# Patient Record
Sex: FEMALE | Race: WHITE | URBAN - METROPOLITAN AREA
[De-identification: names, ages, dates, MRNs, and addresses within clinical notes are randomized per-mention and may not be internally consistent; named-entity substitution may affect disease eponyms.]

---

## 2017-01-09 RX ORDER — PANTOPRAZOLE SODIUM 40 MG/1
TABLET, DELAYED RELEASE ORAL
Qty: 90 TABLET | Refills: 0 | Status: SHIPPED | OUTPATIENT
Start: 2017-01-09 | End: 2017-04-15

## 2017-02-23 ENCOUNTER — TELEPHONE (OUTPATIENT)
Dept: INTERNAL MEDICINE CLINIC | Facility: CLINIC | Age: 62
End: 2017-02-23

## 2017-02-23 NOTE — TELEPHONE ENCOUNTER
Spoke with pt. States that her symptoms started last night. States that she is aching/chilling, has headache and facial pressure, has loose, non-productive cough. Pt states that her stomach is upset but denies vomiting/diarrhea at this time.   Pt states t

## 2017-02-23 NOTE — TELEPHONE ENCOUNTER
Patient has had real bad cough, head ache and body aches. Patient said this started early today and its quickly getting worse. Patient wanted to know if there is any O.T.C.  Medication that she can take that won't effect her high blood pressure medication

## 2017-03-30 ENCOUNTER — TELEPHONE (OUTPATIENT)
Dept: INTERNAL MEDICINE CLINIC | Facility: CLINIC | Age: 62
End: 2017-03-30

## 2017-03-30 DIAGNOSIS — Z12.11 SCREENING FOR MALIGNANT NEOPLASM OF COLON: Primary | ICD-10-CM

## 2017-03-30 NOTE — TELEPHONE ENCOUNTER
Spoke with pt. States that she saw Dr. Hazel Rivera, see Notes 12/3/15, but did not have Cscope done. Pt states that she plans to do Cscope now, and her insurance will pay for the test but not EGD.    States that she thinks Dr. Hazel Rivera is still in her network an

## 2017-03-30 NOTE — TELEPHONE ENCOUNTER
Patient called regarding back in 2015 (December) Dr. Abbey Mariscal ordered a referral for a colonoscopy and endoscopy. Patient never got these procedures done, because she didn't know if they were covered under insurance.  Patient finally contacted insurance and wa

## 2017-04-01 ENCOUNTER — HOSPITAL ENCOUNTER (OUTPATIENT)
Dept: MAMMOGRAPHY | Facility: HOSPITAL | Age: 62
Discharge: HOME | End: 2017-04-01
Attending: INTERNAL MEDICINE
Payer: COMMERCIAL

## 2017-04-01 DIAGNOSIS — Z12.39 BREAST CANCER SCREENING: ICD-10-CM

## 2017-04-01 PROCEDURE — 77067 SCR MAMMO BI INCL CAD: CPT

## 2017-04-17 RX ORDER — PANTOPRAZOLE SODIUM 40 MG/1
TABLET, DELAYED RELEASE ORAL
Qty: 90 TABLET | Refills: 0 | Status: SHIPPED | OUTPATIENT
Start: 2017-04-17 | End: 2017-07-08

## 2017-04-17 NOTE — TELEPHONE ENCOUNTER
Last OV pertinent to medication: 7/14/16 for physical   Last refill date: 1/9/17     #/refills: #90 + 0  When pt was asked to return for OV: 1 year   Upcoming appt/reason: No future appt

## 2017-07-08 ENCOUNTER — APPOINTMENT (OUTPATIENT)
Dept: GENERAL RADIOLOGY | Facility: HOSPITAL | Age: 62
End: 2017-07-08
Attending: EMERGENCY MEDICINE
Payer: COMMERCIAL

## 2017-07-08 ENCOUNTER — HOSPITAL ENCOUNTER (EMERGENCY)
Facility: HOSPITAL | Age: 62
Discharge: HOME | End: 2017-07-08
Attending: EMERGENCY MEDICINE
Payer: COMMERCIAL

## 2017-07-08 VITALS
TEMPERATURE: 99 F | SYSTOLIC BLOOD PRESSURE: 140 MMHG | DIASTOLIC BLOOD PRESSURE: 84 MMHG | HEIGHT: 69 IN | BODY MASS INDEX: 27.4 KG/M2 | RESPIRATION RATE: 14 BRPM | OXYGEN SATURATION: 98 % | HEART RATE: 82 BPM | WEIGHT: 185 LBS

## 2017-07-08 DIAGNOSIS — S60.222A CONTUSION OF LEFT HAND, INITIAL ENCOUNTER: Primary | ICD-10-CM

## 2017-07-08 DIAGNOSIS — S01.81XA FACIAL LACERATION, INITIAL ENCOUNTER: ICD-10-CM

## 2017-07-08 PROCEDURE — 12011 RPR F/E/E/N/L/M 2.5 CM/<: CPT

## 2017-07-08 PROCEDURE — 99283 EMERGENCY DEPT VISIT LOW MDM: CPT

## 2017-07-08 PROCEDURE — 73110 X-RAY EXAM OF WRIST: CPT | Performed by: EMERGENCY MEDICINE

## 2017-07-08 PROCEDURE — 73130 X-RAY EXAM OF HAND: CPT | Performed by: EMERGENCY MEDICINE

## 2017-07-08 NOTE — ED INITIAL ASSESSMENT (HPI)
Pt here after walking outside this am for exercise , tripped and hit sidewalk hitting head on right upper forehead. Denies LOC. C/o pain to left hand with slight edema to palm and thumb area. +abrasions to left knee.

## 2017-07-08 NOTE — ED PROVIDER NOTES
Patient Seen in: [facility] Emergency Department    History   Patient presents with:  Fall (musculoskeletal, neurologic)    Stated Complaint: fall    TEJINDER Burks is a pleasant 17-year-old female presenting to the [facility] emergency department for f Review of Systems    Positive for stated complaint: fall  Other systems are as noted in HPI. Constitutional and vital signs reviewed. All other systems reviewed and negative except as noted above.     PSFH elements reviewed from today and agreed specified.     Medications Prescribed:  Current Discharge Medication List

## 2017-07-10 RX ORDER — PANTOPRAZOLE SODIUM 40 MG/1
TABLET, DELAYED RELEASE ORAL
Qty: 90 TABLET | Refills: 0 | Status: SHIPPED | OUTPATIENT
Start: 2017-07-10 | End: 2017-08-05

## 2017-07-10 NOTE — TELEPHONE ENCOUNTER
Last OV pertinent to medication: 7/14/2016 - PE  Last refill date: 4/17/2017     #/refills: 90/0  When pt was asked to return for OV: 1 year  Upcoming appt/reason: 8/3/2017 - PE

## 2017-07-14 ENCOUNTER — OFFICE VISIT (OUTPATIENT)
Dept: INTERNAL MEDICINE CLINIC | Facility: CLINIC | Age: 62
End: 2017-07-14

## 2017-07-14 VITALS
HEART RATE: 84 BPM | WEIGHT: 191 LBS | OXYGEN SATURATION: 99 % | DIASTOLIC BLOOD PRESSURE: 80 MMHG | HEIGHT: 67.5 IN | SYSTOLIC BLOOD PRESSURE: 124 MMHG | BODY MASS INDEX: 29.63 KG/M2 | RESPIRATION RATE: 12 BRPM

## 2017-07-14 DIAGNOSIS — M25.532 LEFT WRIST PAIN: ICD-10-CM

## 2017-07-14 DIAGNOSIS — Z48.02 VISIT FOR SUTURE REMOVAL: Primary | ICD-10-CM

## 2017-07-14 PROCEDURE — 99213 OFFICE O/P EST LOW 20 MIN: CPT | Performed by: PHYSICIAN ASSISTANT

## 2017-07-14 NOTE — PROGRESS NOTES
Yash Langston is a 63year old female  Patient presents with:  Suture Removal      HPI:   Pt states she went for a walk last Saturday and wasn't paying attention and was listening/watching the birds and she tripped and fell, tried to catch herself with BMI 29.47 kg/m²   GENERAL: well developed, well nourished,in no apparent distress  SKIN: 4 sutures removed from superior lateral aspect of eye brow;    HEENT: atraumatic, normocephalic,ears and throat are clear, no pallor or icterus  NECK: supple,no adenopa

## 2017-08-05 ENCOUNTER — OFFICE VISIT (OUTPATIENT)
Dept: INTERNAL MEDICINE CLINIC | Facility: CLINIC | Age: 62
End: 2017-08-05

## 2017-08-05 VITALS
WEIGHT: 191 LBS | HEART RATE: 72 BPM | RESPIRATION RATE: 12 BRPM | SYSTOLIC BLOOD PRESSURE: 120 MMHG | HEIGHT: 67.72 IN | TEMPERATURE: 98 F | BODY MASS INDEX: 29.29 KG/M2 | DIASTOLIC BLOOD PRESSURE: 76 MMHG

## 2017-08-05 DIAGNOSIS — Z00.00 ROUTINE GENERAL MEDICAL EXAMINATION AT A HEALTH CARE FACILITY: Primary | ICD-10-CM

## 2017-08-05 DIAGNOSIS — K21.00 GASTROESOPHAGEAL REFLUX DISEASE WITH ESOPHAGITIS: ICD-10-CM

## 2017-08-05 DIAGNOSIS — I83.93 VARICOSE VEINS OF BOTH LOWER EXTREMITIES: ICD-10-CM

## 2017-08-05 DIAGNOSIS — I10 ESSENTIAL HYPERTENSION: ICD-10-CM

## 2017-08-05 PROCEDURE — 99396 PREV VISIT EST AGE 40-64: CPT | Performed by: INTERNAL MEDICINE

## 2017-08-05 RX ORDER — PANTOPRAZOLE SODIUM 40 MG/1
TABLET, DELAYED RELEASE ORAL
Qty: 90 TABLET | Refills: 3 | Status: SHIPPED | OUTPATIENT
Start: 2017-08-05 | End: 2018-10-06

## 2017-08-05 RX ORDER — LISINOPRIL AND HYDROCHLOROTHIAZIDE 25; 20 MG/1; MG/1
1 TABLET ORAL
Qty: 90 TABLET | Refills: 3 | Status: SHIPPED | OUTPATIENT
Start: 2017-08-05 | End: 2018-10-20

## 2017-08-05 RX ORDER — MULTIVIT-MIN/IRON/FOLIC ACID/K 18-600-40
CAPSULE ORAL
Qty: 30 CAPSULE | Refills: 0 | COMMUNITY
Start: 2017-08-05 | End: 2018-06-19 | Stop reason: ALTCHOICE

## 2017-08-05 RX ORDER — METOPROLOL SUCCINATE 25 MG/1
25 TABLET, EXTENDED RELEASE ORAL
Qty: 90 TABLET | Refills: 3 | Status: SHIPPED | OUTPATIENT
Start: 2017-08-05 | End: 2018-10-20

## 2017-08-05 NOTE — PROGRESS NOTES
HPI:   Marcia Hope is a 63year old female who presents for a complete physical exam.     Wt Readings from Last 6 Encounters:  08/05/17 : 191 lb  07/14/17 : 191 lb  07/08/17 : 185 lb  04/21/17 : 190 lb  07/14/16 : 201 lb  02/22/16 : 203 lb    Body ma exertion  GI: denies abdominal pain, change in bm's, bloody stools, she is having worsening heartburn, has to control w/lifestyle strictly.  No dysphagia or odynophagia.   : chronic PPI, couln'd wean, she is now scheduled for EGD/CSCOPE in October   : den instructions given for healthy living as indicated. The patient indicates understanding of these issues and agrees to the plan. The patient is asked to return for CPX in 1 year.     Routine general medical examination at a health care facility  (primary en

## 2017-09-06 ENCOUNTER — TELEPHONE (OUTPATIENT)
Dept: INTERNAL MEDICINE CLINIC | Facility: CLINIC | Age: 62
End: 2017-09-06

## 2017-09-06 NOTE — TELEPHONE ENCOUNTER
Patient phoned. She wants to speak with nurse about \"potential issue she has with upcoming colonscopy\". Patient didn't want to provide details. Patient is seeing Dr. Eugenia Rojas on 10/2/17 for the procedure.

## 2017-09-06 NOTE — TELEPHONE ENCOUNTER
Spoke with pt. Pt states that she has had a problem with rash/lesions in anal area off/on for many years. States that they go away after several days and she has never been seen by a doctor for this.  Pt states that when she mentioned this to the GI doctor

## 2017-09-07 NOTE — TELEPHONE ENCOUNTER
Patient was given several options for appointment times and none of them fit in her schedule. Patient is requesting a prescription for the rash. Please advise.

## 2017-09-19 ENCOUNTER — TELEPHONE (OUTPATIENT)
Dept: INTERNAL MEDICINE CLINIC | Facility: CLINIC | Age: 62
End: 2017-09-19

## 2017-09-19 NOTE — TELEPHONE ENCOUNTER
Patient phoned and is asking if she should see Dr. Merari Marshall or a specialist about the skin rash near her rectum.   The gastroenterologist stated she should see an infectious disease specialist but patient is asking if that is the correct course of action to ta

## 2017-09-19 NOTE — TELEPHONE ENCOUNTER
Spoke with pt, she had called about this before (telephone encounter 9/6/17) and was instructed she need an evaluation at that time, she was recommended to go to urgent care for sooner appt since no immediate appts were available at that time, she did not

## 2017-09-21 NOTE — TELEPHONE ENCOUNTER
Patient has a appointment with Dr. Michael Morales on 9/30/2017 for \"skin lesion\" that was made on 9/19/17, please advise if needed, thank you.

## 2017-09-21 NOTE — TELEPHONE ENCOUNTER
Spoke with pt. Confirmed 9/30/17 appt with Dr. Marilyn Chung to be seen for rash/lesions in anal area.    See 9/6/17 documentation below--Pt had stated that GI doctor recommended that colonoscopy be postponed if pt had active rash in anal area at time of test.

## 2017-09-21 NOTE — TELEPHONE ENCOUNTER
I really have no idea, does she have a skin lesion that needs to be evaluated?   I'm not really following   Also she cancelled GI procedures for October, why??

## 2017-09-29 ENCOUNTER — TELEPHONE (OUTPATIENT)
Dept: INTERNAL MEDICINE CLINIC | Facility: CLINIC | Age: 62
End: 2017-09-29

## 2017-09-30 ENCOUNTER — OFFICE VISIT (OUTPATIENT)
Dept: INTERNAL MEDICINE CLINIC | Facility: CLINIC | Age: 62
End: 2017-09-30

## 2017-09-30 VITALS
TEMPERATURE: 98 F | HEIGHT: 67.13 IN | RESPIRATION RATE: 16 BRPM | SYSTOLIC BLOOD PRESSURE: 114 MMHG | WEIGHT: 192 LBS | BODY MASS INDEX: 29.78 KG/M2 | HEART RATE: 72 BPM | DIASTOLIC BLOOD PRESSURE: 78 MMHG

## 2017-09-30 DIAGNOSIS — K62.9 RECTAL LESION: Primary | ICD-10-CM

## 2017-09-30 DIAGNOSIS — Z23 NEED FOR VACCINATION: ICD-10-CM

## 2017-09-30 PROCEDURE — 90471 IMMUNIZATION ADMIN: CPT | Performed by: INTERNAL MEDICINE

## 2017-09-30 PROCEDURE — 90686 IIV4 VACC NO PRSV 0.5 ML IM: CPT | Performed by: INTERNAL MEDICINE

## 2017-09-30 PROCEDURE — 99213 OFFICE O/P EST LOW 20 MIN: CPT | Performed by: INTERNAL MEDICINE

## 2018-06-19 ENCOUNTER — TELEPHONE (OUTPATIENT)
Dept: INTERNAL MEDICINE CLINIC | Facility: CLINIC | Age: 63
End: 2018-06-19

## 2018-06-19 ENCOUNTER — OFFICE VISIT (OUTPATIENT)
Dept: INTERNAL MEDICINE CLINIC | Facility: CLINIC | Age: 63
End: 2018-06-19

## 2018-06-19 ENCOUNTER — LABORATORY ENCOUNTER (OUTPATIENT)
Dept: LAB | Age: 63
End: 2018-06-19
Attending: INTERNAL MEDICINE
Payer: COMMERCIAL

## 2018-06-19 VITALS
SYSTOLIC BLOOD PRESSURE: 102 MMHG | HEART RATE: 77 BPM | RESPIRATION RATE: 14 BRPM | DIASTOLIC BLOOD PRESSURE: 60 MMHG | TEMPERATURE: 98 F | BODY MASS INDEX: 30.24 KG/M2 | WEIGHT: 192.63 LBS | OXYGEN SATURATION: 99 % | HEIGHT: 67 IN

## 2018-06-19 DIAGNOSIS — R11.2 NON-INTRACTABLE VOMITING WITH NAUSEA, UNSPECIFIED VOMITING TYPE: ICD-10-CM

## 2018-06-19 DIAGNOSIS — R10.84 GENERALIZED ABDOMINAL PAIN: ICD-10-CM

## 2018-06-19 DIAGNOSIS — R10.84 GENERALIZED ABDOMINAL PAIN: Primary | ICD-10-CM

## 2018-06-19 DIAGNOSIS — K21.00 GASTROESOPHAGEAL REFLUX DISEASE WITH ESOPHAGITIS: ICD-10-CM

## 2018-06-19 PROCEDURE — 99214 OFFICE O/P EST MOD 30 MIN: CPT | Performed by: INTERNAL MEDICINE

## 2018-06-19 PROCEDURE — 85025 COMPLETE CBC W/AUTO DIFF WBC: CPT | Performed by: INTERNAL MEDICINE

## 2018-06-19 PROCEDURE — 80053 COMPREHEN METABOLIC PANEL: CPT | Performed by: INTERNAL MEDICINE

## 2018-06-19 PROCEDURE — 36415 COLL VENOUS BLD VENIPUNCTURE: CPT | Performed by: INTERNAL MEDICINE

## 2018-06-19 RX ORDER — ONDANSETRON 4 MG/1
4 TABLET, FILM COATED ORAL EVERY 8 HOURS PRN
Qty: 10 TABLET | Refills: 0 | Status: SHIPPED | OUTPATIENT
Start: 2018-06-19 | End: 2018-10-12 | Stop reason: ALTCHOICE

## 2018-06-19 NOTE — TELEPHONE ENCOUNTER
Patient has the stomach flu for the 3rd time in 3 1/2 weeks. She wanted to be evaluated and made an appt with Dr Wiliam Pillai for 1:20 today. Should she be seen sooner?

## 2018-06-19 NOTE — TELEPHONE ENCOUNTER
Attempted to triage the patient, but she was not home. LM for the patient to call to triage. Noted in chart that the patient has been seeing GI since 2015. The patient had an upper endoscopy scheduled on 10/02/17, but it was cancelled.  Unsure as to why it

## 2018-06-19 NOTE — PROGRESS NOTES
German Urena is a 66year old female  Patient presents with:  Stomach Pain  Nausea  Vomiting  Diarrhea      HPI:   3 distinct episodes of n/v and diarrhea, 1st episode 3 weeks ago, all episodes short lived, 1 day    Last one last night, she was feelin occlusion)     HTN (hypertension)     Gastroesophageal reflux disease with esophagitis        REVIEW OF SYSTEMS:   GENERAL HEALTH: feels well otherwise      EXAM:   /60 (BP Location: Left arm, Patient Position: Sitting, Cuff Size: adult)   Pulse 77

## 2018-06-19 NOTE — TELEPHONE ENCOUNTER
Spoke with pt, 3x in past 3 weeks has had sudden onset of nausea then vomiting and diarrhea lasting approx day or less. Last episode last night, resolved today, did not have diarrhea this time however. Son and  has had similar symptoms.  Each time [employer]

## 2018-10-08 RX ORDER — PANTOPRAZOLE SODIUM 40 MG/1
TABLET, DELAYED RELEASE ORAL
Qty: 30 TABLET | Refills: 0 | Status: SHIPPED | OUTPATIENT
Start: 2018-10-08 | End: 2019-02-12

## 2018-10-08 NOTE — TELEPHONE ENCOUNTER
Last OV relevant to medication: 6/19/18  Last refill date: 8/5/17     #/refills: 90/3  When pt was asked to return for OV: none  Upcoming appt/reason: none, called and left message for patient, she is due for PE

## 2018-10-12 ENCOUNTER — TELEPHONE (OUTPATIENT)
Dept: INTERNAL MEDICINE CLINIC | Facility: CLINIC | Age: 63
End: 2018-10-12

## 2018-10-12 ENCOUNTER — OFFICE VISIT (OUTPATIENT)
Dept: INTERNAL MEDICINE CLINIC | Facility: CLINIC | Age: 63
End: 2018-10-12
Payer: COMMERCIAL

## 2018-10-12 VITALS
HEART RATE: 65 BPM | TEMPERATURE: 98 F | WEIGHT: 194.38 LBS | DIASTOLIC BLOOD PRESSURE: 76 MMHG | RESPIRATION RATE: 16 BRPM | HEIGHT: 67 IN | OXYGEN SATURATION: 98 % | BODY MASS INDEX: 30.51 KG/M2 | SYSTOLIC BLOOD PRESSURE: 112 MMHG

## 2018-10-12 DIAGNOSIS — Z23 NEED FOR VACCINATION: ICD-10-CM

## 2018-10-12 DIAGNOSIS — A60.00 HERPES SIMPLEX INFECTION OF GENITOURINARY SYSTEM: Primary | ICD-10-CM

## 2018-10-12 PROCEDURE — 99213 OFFICE O/P EST LOW 20 MIN: CPT | Performed by: INTERNAL MEDICINE

## 2018-10-12 PROCEDURE — 90471 IMMUNIZATION ADMIN: CPT | Performed by: INTERNAL MEDICINE

## 2018-10-12 PROCEDURE — 90686 IIV4 VACC NO PRSV 0.5 ML IM: CPT | Performed by: INTERNAL MEDICINE

## 2018-10-12 RX ORDER — VALACYCLOVIR HYDROCHLORIDE 1 G/1
2 TABLET, FILM COATED ORAL EVERY 12 HOURS SCHEDULED
Qty: 4 TABLET | Refills: 1 | Status: SHIPPED | OUTPATIENT
Start: 2018-10-12 | End: 2018-10-13

## 2018-10-12 NOTE — PROGRESS NOTES
Tyron Fu is a 66year old female  Patient presents with:  Rash: Left Buttock - stings and itches  recurrent rash buttocks for 30 years,     HPI:   Less and less outbreaks  Last outbreak>1 year  Does not self-treat  Usually last 3-5 days     Lesia PLAN:   Herpes simplex infection of genitourinary system  (primary encounter diagnosis) infrequent recurrences- valtrex prn    No orders of the defined types were placed in this encounter.       Meds & Refills for this Visit:  Requested Prescriptions     Si

## 2018-10-12 NOTE — TELEPHONE ENCOUNTER
Patient stated that Dr. Jesus Golden had asked her to return when patient was actively having a flare up of rash near her buttock so that Dr. Jesus Golden could examine.  Patient is having a flare up and is wanting to schedule today, patient states having open lesions be

## 2018-10-22 RX ORDER — LISINOPRIL AND HYDROCHLOROTHIAZIDE 25; 20 MG/1; MG/1
TABLET ORAL
Qty: 90 TABLET | Refills: 0 | Status: SHIPPED | OUTPATIENT
Start: 2018-10-22 | End: 2019-01-26

## 2018-10-22 RX ORDER — METOPROLOL SUCCINATE 25 MG/1
TABLET, EXTENDED RELEASE ORAL
Qty: 90 TABLET | Refills: 0 | Status: SHIPPED | OUTPATIENT
Start: 2018-10-22 | End: 2019-01-26

## 2018-11-03 ENCOUNTER — OFFICE VISIT (OUTPATIENT)
Dept: INTERNAL MEDICINE CLINIC | Facility: CLINIC | Age: 63
End: 2018-11-03
Payer: COMMERCIAL

## 2018-11-03 VITALS
HEIGHT: 67 IN | RESPIRATION RATE: 14 BRPM | OXYGEN SATURATION: 99 % | SYSTOLIC BLOOD PRESSURE: 134 MMHG | DIASTOLIC BLOOD PRESSURE: 82 MMHG | HEART RATE: 79 BPM | WEIGHT: 195.38 LBS | TEMPERATURE: 98 F | BODY MASS INDEX: 30.66 KG/M2

## 2018-11-03 DIAGNOSIS — K21.00 GASTROESOPHAGEAL REFLUX DISEASE WITH ESOPHAGITIS: ICD-10-CM

## 2018-11-03 DIAGNOSIS — Z12.11 COLON CANCER SCREENING: ICD-10-CM

## 2018-11-03 DIAGNOSIS — I10 ESSENTIAL HYPERTENSION: ICD-10-CM

## 2018-11-03 DIAGNOSIS — Z00.00 ROUTINE GENERAL MEDICAL EXAMINATION AT A HEALTH CARE FACILITY: Primary | ICD-10-CM

## 2018-11-03 DIAGNOSIS — R10.84 GENERALIZED ABDOMINAL PAIN: ICD-10-CM

## 2018-11-03 DIAGNOSIS — Z79.899 HIGH RISK MEDICATION USE: ICD-10-CM

## 2018-11-03 DIAGNOSIS — Z12.39 SCREENING BREAST EXAMINATION: ICD-10-CM

## 2018-11-03 PROCEDURE — 99396 PREV VISIT EST AGE 40-64: CPT | Performed by: INTERNAL MEDICINE

## 2018-11-03 PROCEDURE — 99213 OFFICE O/P EST LOW 20 MIN: CPT | Performed by: INTERNAL MEDICINE

## 2018-11-03 PROCEDURE — 87624 HPV HI-RISK TYP POOLED RSLT: CPT | Performed by: INTERNAL MEDICINE

## 2018-11-03 RX ORDER — RANITIDINE 300 MG/1
300 TABLET ORAL NIGHTLY
Qty: 90 TABLET | Refills: 3 | Status: SHIPPED | OUTPATIENT
Start: 2018-11-03 | End: 2020-03-16

## 2018-11-03 NOTE — PROGRESS NOTES
HPI:   Karishma Officer is a 66year old female who presents for a complete physical exam.     Wt Readings from Last 6 Encounters:  11/03/18 : 195 lb 6.4 oz  10/12/18 : 194 lb 6.4 oz  06/19/18 : 192 lb 9.6 oz  09/30/17 : 192 lb  09/05/17 : 191 lb  08/05/1 double vision  HEENT: denies nasal congestion, sinus pain or ST  LUNGS: denies shortness of breath with exertion  CARDIOVASCULAR: denies chest pain on exertion  GI: denies abdominal pain, change in bm's, bloody stools, she is having worsening heartburn, ha stasis change  NEURO: Oriented times three,cranial nerves are intact,motor and sensory are grossly intact, DTR's b/l equal,            ASSESSMENT AND PLAN:   Raul Randle is a 66year old female who presents for a complete physical exam.pap, breast an Refills for this Visit:  Requested Prescriptions     Signed Prescriptions Disp Refills   • RaNITidine HCl 300 MG Oral Tab 90 tablet 3     Sig: Take 1 tablet (300 mg total) by mouth nightly.        Imaging & Consults:  GASTRO - INTERNAL  MYRA SCREENING BILAT

## 2018-11-07 ENCOUNTER — LAB ENCOUNTER (OUTPATIENT)
Dept: LAB | Age: 63
End: 2018-11-07
Attending: INTERNAL MEDICINE
Payer: COMMERCIAL

## 2018-11-07 DIAGNOSIS — Z79.899 HIGH RISK MEDICATION USE: ICD-10-CM

## 2018-11-07 DIAGNOSIS — Z00.00 ROUTINE GENERAL MEDICAL EXAMINATION AT A HEALTH CARE FACILITY: Primary | ICD-10-CM

## 2018-11-07 PROCEDURE — 82607 VITAMIN B-12: CPT | Performed by: INTERNAL MEDICINE

## 2018-11-07 PROCEDURE — 80050 GENERAL HEALTH PANEL: CPT | Performed by: INTERNAL MEDICINE

## 2018-11-07 PROCEDURE — 83735 ASSAY OF MAGNESIUM: CPT | Performed by: INTERNAL MEDICINE

## 2018-11-07 PROCEDURE — 82306 VITAMIN D 25 HYDROXY: CPT | Performed by: INTERNAL MEDICINE

## 2018-11-07 PROCEDURE — 80061 LIPID PANEL: CPT | Performed by: INTERNAL MEDICINE

## 2018-11-07 PROCEDURE — 36415 COLL VENOUS BLD VENIPUNCTURE: CPT | Performed by: INTERNAL MEDICINE

## 2018-12-11 ENCOUNTER — HOSPITAL ENCOUNTER (OUTPATIENT)
Dept: MAMMOGRAPHY | Facility: HOSPITAL | Age: 63
Discharge: HOME | End: 2018-12-11
Attending: INTERNAL MEDICINE
Payer: COMMERCIAL

## 2018-12-11 DIAGNOSIS — Z12.39 SCREENING BREAST EXAMINATION: ICD-10-CM

## 2018-12-11 PROCEDURE — 77067 SCR MAMMO BI INCL CAD: CPT | Performed by: INTERNAL MEDICINE

## 2018-12-11 PROCEDURE — 77063 BREAST TOMOSYNTHESIS BI: CPT | Performed by: INTERNAL MEDICINE

## 2019-01-28 RX ORDER — METOPROLOL SUCCINATE 25 MG/1
TABLET, EXTENDED RELEASE ORAL
Qty: 90 TABLET | Refills: 1 | Status: SHIPPED | OUTPATIENT
Start: 2019-01-28 | End: 2019-07-13

## 2019-01-28 RX ORDER — LISINOPRIL AND HYDROCHLOROTHIAZIDE 25; 20 MG/1; MG/1
TABLET ORAL
Qty: 90 TABLET | Refills: 1 | Status: SHIPPED | OUTPATIENT
Start: 2019-01-28 | End: 2019-07-13

## 2019-02-21 ENCOUNTER — OFFICE VISIT (OUTPATIENT)
Dept: SURGERY | Facility: CLINIC | Age: 64
End: 2019-02-21
Payer: COMMERCIAL

## 2019-02-21 VITALS
HEART RATE: 74 BPM | SYSTOLIC BLOOD PRESSURE: 129 MMHG | DIASTOLIC BLOOD PRESSURE: 80 MMHG | WEIGHT: 185 LBS | HEIGHT: 68 IN | BODY MASS INDEX: 28.04 KG/M2

## 2019-02-21 DIAGNOSIS — I10 ESSENTIAL HYPERTENSION: ICD-10-CM

## 2019-02-21 DIAGNOSIS — I83.819 VARICOSE VEINS WITH PAIN: ICD-10-CM

## 2019-02-21 DIAGNOSIS — Z86.79 S/P SCLEROTHERAPY OF VARICOSE VEINS: ICD-10-CM

## 2019-02-21 DIAGNOSIS — I83.899 VARICOSE VEINS WITH COMPLICATIONS: Primary | ICD-10-CM

## 2019-02-21 DIAGNOSIS — Z98.890 S/P SCLEROTHERAPY OF VARICOSE VEINS: ICD-10-CM

## 2019-02-21 PROCEDURE — 99243 OFF/OP CNSLTJ NEW/EST LOW 30: CPT | Performed by: SURGERY

## 2019-02-22 NOTE — H&P
New Patient Visit Note       Active Problems      1. Varicose veins with complications    2. Varicose veins with pain    3. Essential hypertension    4.  S/P sclerotherapy of varicose veins        Chief Complaint   Patient presents with:  Varicose Veins: NE Vitamins-Minerals (EYE VITAMINS) Oral Cap, Take 1 capsule by mouth daily. , Disp: , Rfl:       Review of Systems  The Review of Systems has been reviewed by me during today.   Review of Systems    Physical Findings   /80   Pulse 74   Ht 68\"   Wt 185 l extremity  The patient will follow up with me after ultrasound is completed to discuss potential treatment options. The risks, benefits, complications, possible outcomes and alternatives of the procedure were explained to the patient in detail.

## 2019-03-20 ENCOUNTER — TELEPHONE (OUTPATIENT)
Dept: INTERNAL MEDICINE CLINIC | Facility: CLINIC | Age: 64
End: 2019-03-20

## 2019-03-20 NOTE — TELEPHONE ENCOUNTER
Patient wants to talk to a nurse about some \"medical issues\" she is having PSR tried to ask the patient what type of issues this was, the patient did not want to discuss with PSR. Did ask if this was emergent, any serious issues and the patient said no.

## 2019-03-20 NOTE — TELEPHONE ENCOUNTER
Patient calling in. Patient has a colonoscopy scheduled for 5/13/19. Patient is dx with herpes simplex infection of the genitourinary system.  Patient states that Dr. Tamika John [facility]) told her that she will not be able to have the colonoscopy if she has an a

## 2019-03-21 RX ORDER — VALACYCLOVIR HYDROCHLORIDE 1 G/1
1 TABLET, FILM COATED ORAL DAILY
Qty: 90 TABLET | Refills: 3 | Status: SHIPPED | OUTPATIENT
Start: 2019-03-21 | End: 2020-03-03

## 2019-03-21 NOTE — TELEPHONE ENCOUNTER
I would recommend she take the valtrex as prescribed right now for the current symptoms, 2 tabs now and then again in 12 h    Then start 1 g valtrex daily for the next year to prevent future outbreaks, #90 3 refills.  If she feels any outbreak coming on dur

## 2019-03-21 NOTE — TELEPHONE ENCOUNTER
Spoke to patient, she is aware of Dr. Wilian Ruiz recommendation. Patient is agreeable to use the medications as prescribed and will call if she changes her mind. Prescription sent to patients pharmacy.  Prescription instructions explained to patient in detail

## 2019-04-17 ENCOUNTER — OFFICE VISIT (OUTPATIENT)
Dept: OTHER | Facility: HOSPITAL | Age: 64
End: 2019-04-17
Attending: PREVENTIVE MEDICINE

## 2019-04-17 ENCOUNTER — HOSPITAL ENCOUNTER (OUTPATIENT)
Dept: GENERAL RADIOLOGY | Facility: HOSPITAL | Age: 64
Discharge: HOME | End: 2019-04-17
Attending: PREVENTIVE MEDICINE

## 2019-04-17 DIAGNOSIS — Z00.00 ANNUAL PHYSICAL EXAM: Primary | ICD-10-CM

## 2019-05-13 PROCEDURE — 88305 TISSUE EXAM BY PATHOLOGIST: CPT | Performed by: INTERNAL MEDICINE

## 2019-05-14 PROBLEM — Z86.010 HX OF ADENOMATOUS COLONIC POLYPS: Status: ACTIVE | Noted: 2019-05-14

## 2019-05-14 PROBLEM — Z86.0101 HX OF ADENOMATOUS COLONIC POLYPS: Status: ACTIVE | Noted: 2019-05-14

## 2019-05-29 ENCOUNTER — TELEPHONE (OUTPATIENT)
Dept: INTERNAL MEDICINE CLINIC | Facility: CLINIC | Age: 64
End: 2019-05-29

## 2019-05-29 NOTE — TELEPHONE ENCOUNTER
Patient calling to confirm what medications she is taking that are considered water pills (as she was told to discontinue water pill medication before her upcoming eye surgery 06-13-19). Please advise. Thank you!

## 2019-05-29 NOTE — TELEPHONE ENCOUNTER
Spoke with Samuel in [facility] office. Confirmed that diuretics need to be held the morning of the procedure, but can be resumed the same day after the procedure. Spoke with patient and discussed lisinopril-HCTZ.  Patient aware to hold medication

## 2019-06-07 ENCOUNTER — TELEPHONE (OUTPATIENT)
Dept: INTERNAL MEDICINE CLINIC | Facility: CLINIC | Age: 64
End: 2019-06-07

## 2019-06-07 NOTE — TELEPHONE ENCOUNTER
Incoming (mail or fax): Mail  Received from:  [employer]  Documentation given to:  Natasha George test results bin. Multiple testing, Consults. 51 pages.

## 2019-06-10 NOTE — TELEPHONE ENCOUNTER
Paperwork with multiple studies and assessment received from [employer] for [facility] Screening Program\" associated with employer ([employer]). Labs entered in external results console, as able. Routed to Dr Roman Mcfadden for review.

## 2019-06-20 ENCOUNTER — MED REC SCAN ONLY (OUTPATIENT)
Dept: INTERNAL MEDICINE CLINIC | Facility: CLINIC | Age: 64
End: 2019-06-20

## 2019-07-16 RX ORDER — LISINOPRIL AND HYDROCHLOROTHIAZIDE 25; 20 MG/1; MG/1
TABLET ORAL
Qty: 90 TABLET | Refills: 0 | Status: SHIPPED | OUTPATIENT
Start: 2019-07-16 | End: 2019-10-13

## 2019-07-16 RX ORDER — METOPROLOL SUCCINATE 25 MG/1
TABLET, EXTENDED RELEASE ORAL
Qty: 90 TABLET | Refills: 0 | Status: SHIPPED | OUTPATIENT
Start: 2019-07-16 | End: 2019-10-13

## 2019-07-16 NOTE — TELEPHONE ENCOUNTER
Last OV relevant to medication: 11/3/18 PE  Last refill date: 1/28/19     #/refills: 90/1  When pt was asked to return for OV: 1 yr pe  Upcoming appt/reason: none

## 2019-08-09 ENCOUNTER — TELEPHONE (OUTPATIENT)
Dept: SURGERY | Facility: CLINIC | Age: 64
End: 2019-08-09

## 2019-08-09 DIAGNOSIS — I83.813 VARICOSE VEINS OF BOTH LOWER EXTREMITIES WITH PAIN: Primary | ICD-10-CM

## 2019-08-09 NOTE — TELEPHONE ENCOUNTER
[facility] phoned our office, states pt is trying to schedule us doppler venous insuflt of bilateral legs. Dr.klade purcell notes order to be placed. Order faxed to [facility]. Pt notified of above. Pt states she has paper copy from [facility].

## 2019-09-12 ENCOUNTER — OFFICE VISIT (OUTPATIENT)
Dept: SURGERY | Facility: CLINIC | Age: 64
End: 2019-09-12
Payer: COMMERCIAL

## 2019-09-12 VITALS
TEMPERATURE: 98 F | HEIGHT: 68 IN | DIASTOLIC BLOOD PRESSURE: 77 MMHG | SYSTOLIC BLOOD PRESSURE: 122 MMHG | HEART RATE: 80 BPM | WEIGHT: 185 LBS | BODY MASS INDEX: 28.04 KG/M2

## 2019-09-12 DIAGNOSIS — I83.899 VARICOSE VEINS WITH COMPLICATIONS: Primary | ICD-10-CM

## 2019-09-12 DIAGNOSIS — I83.819 VARICOSE VEINS WITH PAIN: ICD-10-CM

## 2019-09-12 DIAGNOSIS — Z86.79 S/P SCLEROTHERAPY OF VARICOSE VEINS: ICD-10-CM

## 2019-09-12 DIAGNOSIS — Z98.890 S/P SCLEROTHERAPY OF VARICOSE VEINS: ICD-10-CM

## 2019-09-12 PROCEDURE — 99213 OFFICE O/P EST LOW 20 MIN: CPT | Performed by: SURGERY

## 2019-10-07 NOTE — PROGRESS NOTES
Follow Up Visit Note       Active Problems      1. Varicose veins with complications    2. Varicose veins with pain    3.  S/P sclerotherapy of varicose veins          Chief Complaint   Patient presents with:  Varicose Veins: f/u after venous u/s 8/13, c/o Disp: 90 tablet, Rfl: 0  •  valACYclovir HCl 1 G Oral Tab, Take 1 tablet (1,000 mg total) by mouth daily. , Disp: 90 tablet, Rfl: 3  •  RaNITidine HCl 300 MG Oral Tab, Take 1 tablet (300 mg total) by mouth nightly., Disp: 90 tablet, Rfl: 3  •  Multiple Lori tenderness. Musculoskeletal: Normal range of motion. She exhibits no deformity. Neurological: She is alert and oriented to person, place, and time. Skin: Skin is warm and dry. No rash noted.        Extremities:    Bilateral  scattered clusters spider obtained           No orders of the defined types were placed in this encounter. The risks, benefits, complications, possible outcomes and alternatives of the procedure were explained to the patient in detail.    Expected postoperative pain, recuperati

## 2019-10-13 DIAGNOSIS — I10 ESSENTIAL HYPERTENSION: Primary | ICD-10-CM

## 2019-10-14 ENCOUNTER — IMMUNIZATION (OUTPATIENT)
Dept: INTERNAL MEDICINE CLINIC | Facility: CLINIC | Age: 64
End: 2019-10-14
Payer: COMMERCIAL

## 2019-10-14 DIAGNOSIS — Z23 NEED FOR VACCINATION: ICD-10-CM

## 2019-10-14 PROCEDURE — 90471 IMMUNIZATION ADMIN: CPT | Performed by: INTERNAL MEDICINE

## 2019-10-14 PROCEDURE — 90686 IIV4 VACC NO PRSV 0.5 ML IM: CPT | Performed by: INTERNAL MEDICINE

## 2019-10-14 RX ORDER — LISINOPRIL AND HYDROCHLOROTHIAZIDE 25; 20 MG/1; MG/1
TABLET ORAL
Qty: 90 TABLET | Refills: 0 | Status: SHIPPED | OUTPATIENT
Start: 2019-10-14 | End: 2020-01-13

## 2019-10-14 RX ORDER — METOPROLOL SUCCINATE 25 MG/1
TABLET, EXTENDED RELEASE ORAL
Qty: 90 TABLET | Refills: 0 | Status: SHIPPED | OUTPATIENT
Start: 2019-10-14 | End: 2019-11-23

## 2019-10-15 ENCOUNTER — TELEPHONE (OUTPATIENT)
Dept: SURGERY | Facility: CLINIC | Age: 64
End: 2019-10-15

## 2019-10-15 NOTE — TELEPHONE ENCOUNTER
L/M with patient stating she can call and schedule varicose vein injections. No pre cert or predetermination needed per her insurance. See previous note.

## 2019-10-15 NOTE — TELEPHONE ENCOUNTER
Called patient's insurance BAS a [employer] plan regarding predetermination for varicose vein injections, bilaterally. I request 6 sessions. Doug Khan ref # [id number] stated no precert or pre determination needed.

## 2019-11-12 ENCOUNTER — OFFICE VISIT (OUTPATIENT)
Dept: SURGERY | Facility: CLINIC | Age: 64
End: 2019-11-12
Payer: COMMERCIAL

## 2019-11-12 VITALS
TEMPERATURE: 97 F | WEIGHT: 185 LBS | BODY MASS INDEX: 28.04 KG/M2 | HEART RATE: 74 BPM | HEIGHT: 68 IN | DIASTOLIC BLOOD PRESSURE: 80 MMHG | SYSTOLIC BLOOD PRESSURE: 129 MMHG

## 2019-11-12 DIAGNOSIS — I83.899 VARICOSE VEINS WITH COMPLICATIONS: Primary | ICD-10-CM

## 2019-11-12 DIAGNOSIS — Z86.79 S/P SCLEROTHERAPY OF VARICOSE VEINS: ICD-10-CM

## 2019-11-12 DIAGNOSIS — I83.819 VARICOSE VEINS WITH PAIN: ICD-10-CM

## 2019-11-12 DIAGNOSIS — Z98.890 S/P SCLEROTHERAPY OF VARICOSE VEINS: ICD-10-CM

## 2019-11-12 PROCEDURE — 36471 NJX SCLRSNT MLT INCMPTNT VN: CPT | Performed by: SURGERY

## 2019-11-23 ENCOUNTER — OFFICE VISIT (OUTPATIENT)
Dept: INTERNAL MEDICINE CLINIC | Facility: CLINIC | Age: 64
End: 2019-11-23
Payer: COMMERCIAL

## 2019-11-23 VITALS
WEIGHT: 180.13 LBS | BODY MASS INDEX: 27.94 KG/M2 | RESPIRATION RATE: 14 BRPM | DIASTOLIC BLOOD PRESSURE: 80 MMHG | HEIGHT: 67.5 IN | SYSTOLIC BLOOD PRESSURE: 134 MMHG | HEART RATE: 81 BPM | OXYGEN SATURATION: 98 %

## 2019-11-23 DIAGNOSIS — Z00.00 ROUTINE GENERAL MEDICAL EXAMINATION AT A HEALTH CARE FACILITY: Primary | ICD-10-CM

## 2019-11-23 DIAGNOSIS — Z12.31 BREAST CANCER SCREENING BY MAMMOGRAM: ICD-10-CM

## 2019-11-23 DIAGNOSIS — E78.00 HIGH CHOLESTEROL: ICD-10-CM

## 2019-11-23 DIAGNOSIS — K21.00 GASTROESOPHAGEAL REFLUX DISEASE WITH ESOPHAGITIS: ICD-10-CM

## 2019-11-23 DIAGNOSIS — T44.7X5A: ICD-10-CM

## 2019-11-23 DIAGNOSIS — I10 ESSENTIAL HYPERTENSION: ICD-10-CM

## 2019-11-23 DIAGNOSIS — Z23 NEED FOR VACCINATION: ICD-10-CM

## 2019-11-23 DIAGNOSIS — A60.00 HERPES SIMPLEX INFECTION OF GENITOURINARY SYSTEM: ICD-10-CM

## 2019-11-23 PROCEDURE — 90715 TDAP VACCINE 7 YRS/> IM: CPT | Performed by: INTERNAL MEDICINE

## 2019-11-23 PROCEDURE — 90471 IMMUNIZATION ADMIN: CPT | Performed by: INTERNAL MEDICINE

## 2019-11-23 PROCEDURE — 99396 PREV VISIT EST AGE 40-64: CPT | Performed by: INTERNAL MEDICINE

## 2019-11-23 PROCEDURE — 99214 OFFICE O/P EST MOD 30 MIN: CPT | Performed by: INTERNAL MEDICINE

## 2019-11-23 RX ORDER — FLUOCINOLONE ACETONIDE 0.11 MG/ML
1 OIL AURICULAR (OTIC)
Refills: 4 | COMMUNITY
Start: 2019-11-18

## 2019-11-23 RX ORDER — METOPROLOL SUCCINATE 25 MG/1
25 TABLET, EXTENDED RELEASE ORAL DAILY
Qty: 45 TABLET | Refills: 0 | COMMUNITY
Start: 2019-11-23 | End: 2020-01-13

## 2019-11-23 NOTE — PROGRESS NOTES
HPI:   Rosanna Gallegos is a 60year old female who presents for a complete physical exam.     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  Children: 2 , LMP:NA , Exercise walks 30 minutes daily     REVIEW OF SYSTEMS:   GENERAL: feels well otherwise  SKIN: denies any unusual skin BS's,no masses, HSM or tendernessmasses  MUSCULOSKELETAL: back is not tender,FROM of the back  EXTREMITIES: no cyanosis, clubbing or edema, no varicosities or stasis change  NEURO: Oriented times three,cranial nerves are intact,motor and sensory are grossl Platelet      CMP      Lipid Panel      TSH W Reflex To Free T4      TdaP (Boostrix/Adacel) Vaccine (> 7 Y)      Meds & Refills for this Visit:  Requested Prescriptions      No prescriptions requested or ordered in this encounter       Imaging & Consults:

## 2019-12-06 ENCOUNTER — OFFICE VISIT (OUTPATIENT)
Dept: SURGERY | Facility: CLINIC | Age: 64
End: 2019-12-06
Payer: COMMERCIAL

## 2019-12-06 VITALS
TEMPERATURE: 98 F | SYSTOLIC BLOOD PRESSURE: 141 MMHG | WEIGHT: 180 LBS | HEART RATE: 80 BPM | BODY MASS INDEX: 27.28 KG/M2 | DIASTOLIC BLOOD PRESSURE: 96 MMHG | HEIGHT: 68 IN

## 2019-12-06 DIAGNOSIS — I83.899 VARICOSE VEINS WITH COMPLICATIONS: Primary | ICD-10-CM

## 2019-12-06 DIAGNOSIS — I83.819 VARICOSE VEINS WITH PAIN: ICD-10-CM

## 2019-12-06 DIAGNOSIS — Z98.890 S/P SCLEROTHERAPY OF VARICOSE VEINS: ICD-10-CM

## 2019-12-06 DIAGNOSIS — Z86.79 S/P SCLEROTHERAPY OF VARICOSE VEINS: ICD-10-CM

## 2019-12-06 PROCEDURE — 36471 NJX SCLRSNT MLT INCMPTNT VN: CPT | Performed by: SURGERY

## 2019-12-24 ENCOUNTER — TELEPHONE (OUTPATIENT)
Dept: INTERNAL MEDICINE CLINIC | Facility: CLINIC | Age: 64
End: 2019-12-24

## 2019-12-24 NOTE — TELEPHONE ENCOUNTER
Patient saw Dr Sanket De Anda on 11/23/19. At the time, Dr Sanket De Anda and patient cut back on BP medication (metoprolol) so she started taking 1/2 of the dose. A couple days after that appointment, patient's BP went up and she started taking her original dosage.   She

## 2019-12-24 NOTE — TELEPHONE ENCOUNTER
Called and spoke with patient. In [address] on 11/23/19, it was discussed that patient would decrease dose of beta blocker (metoprolol) by 1/2.  Patient reports that she did this for 2 days, and began feeling anxious and her blood pressure increased to \"148/somet

## 2020-01-07 ENCOUNTER — OFFICE VISIT (OUTPATIENT)
Dept: SURGERY | Facility: CLINIC | Age: 65
End: 2020-01-07
Payer: COMMERCIAL

## 2020-01-07 VITALS
SYSTOLIC BLOOD PRESSURE: 112 MMHG | HEIGHT: 68 IN | DIASTOLIC BLOOD PRESSURE: 74 MMHG | HEART RATE: 81 BPM | WEIGHT: 180 LBS | BODY MASS INDEX: 27.28 KG/M2

## 2020-01-07 DIAGNOSIS — I83.899 VARICOSE VEINS WITH COMPLICATIONS: Primary | ICD-10-CM

## 2020-01-07 DIAGNOSIS — I83.819 VARICOSE VEINS WITH PAIN: ICD-10-CM

## 2020-01-07 DIAGNOSIS — Z86.79 S/P SCLEROTHERAPY OF VARICOSE VEINS: ICD-10-CM

## 2020-01-07 DIAGNOSIS — Z98.890 S/P SCLEROTHERAPY OF VARICOSE VEINS: ICD-10-CM

## 2020-01-07 PROCEDURE — 36471 NJX SCLRSNT MLT INCMPTNT VN: CPT | Performed by: SURGERY

## 2020-01-07 NOTE — PROCEDURES
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 Discharge and after-care directions were given. The patient states understanding and has no questions at this time. A follow up appointment in two weeks will be made.      EBL: none

## 2020-01-07 NOTE — PROGRESS NOTES
Post Operative Visit Note       Active Problems  1. Varicose veins with complications    2. Varicose veins with pain    3. S/P sclerotherapy of varicose veins         Chief Complaint   Patient presents with:  Varicose Veins: Pt here for 2nd vein injection. 0  •  TURMERIC OR, Take 1 tablet by mouth daily.   , Disp: , Rfl:   •  LISINOPRIL-HYDROCHLOROTHIAZIDE 20-25 MG Oral Tab, TAKE 1 TABLET BY MOUTH EVERY DAY, Disp: 90 tablet, Rfl: 0  •  valACYclovir HCl 1 G Oral Tab, Take 1 tablet (1,000 mg total) by mouth srinivas

## 2020-01-07 NOTE — PROCEDURES
Date of Procedure:        November 12, 2019    Pre op diagnosis: Varicose Veins-reticular veins-spider angiomata    Post op diagnosis: Same    Procedure: Injection sclerotherapy of Varicose Veins-bilateral    Surgeon:  Kris Campos    History of present illness: after the procedure for an appropriate period of time. Discharge and after-care directions were given. The patient states understanding and has no questions at this time. A follow up appointment in two weeks will be made.      EBL: none

## 2020-01-07 NOTE — PROGRESS NOTES
Post Operative Visit Note       Active Problems  1. Varicose veins with complications    2. Varicose veins with pain    3.  S/P sclerotherapy of varicose veins         Chief Complaint   Patient presents with:  Varicose Veins: 1st vein injection, no concerns Tab, Take 1 tablet (1,000 mg total) by mouth daily. , Disp: 90 tablet, Rfl: 3  •  RaNITidine HCl 300 MG Oral Tab, Take 1 tablet (300 mg total) by mouth nightly.  (Patient taking differently: Take 300 mg by mouth nightly as needed.  ), Disp: 90 tablet, Rfl: 3

## 2020-01-08 NOTE — PROGRESS NOTES
Post Operative Visit Note       Active Problems  1. Varicose veins with complications    2. Varicose veins with pain    3.  S/P sclerotherapy of varicose veins         Chief Complaint   Patient presents with:  Varicose Veins: Est pt here for 3rd vein inject Rfl: 0  •  TURMERIC OR, Take 1 tablet by mouth daily.   , Disp: , Rfl:   •  LISINOPRIL-HYDROCHLOROTHIAZIDE 20-25 MG Oral Tab, TAKE 1 TABLET BY MOUTH EVERY DAY, Disp: 90 tablet, Rfl: 0  •  valACYclovir HCl 1 G Oral Tab, Take 1 tablet (1,000 mg total) by mout

## 2020-01-08 NOTE — PROCEDURES
Date of Procedure:        January 8, 2020    Pre op diagnosis: Varicose Veins-reticular veins-spider angiomata    Post op diagnosis: Same    Procedure: Injection sclerotherapy of Varicose Veins-bilateral    Surgeon:  Juan Francisco Sam    History of present illness: after the procedure and was observed in our office after the procedure for an appropriate period of time. Discharge and after-care directions were given. The patient states understanding and has no questions at this time.   A follow up appointment in two

## 2020-01-11 DIAGNOSIS — I10 ESSENTIAL HYPERTENSION: ICD-10-CM

## 2020-01-13 RX ORDER — LISINOPRIL AND HYDROCHLOROTHIAZIDE 25; 20 MG/1; MG/1
TABLET ORAL
Qty: 90 TABLET | Refills: 1 | Status: SHIPPED | OUTPATIENT
Start: 2020-01-13 | End: 2020-06-29

## 2020-01-13 RX ORDER — METOPROLOL SUCCINATE 25 MG/1
TABLET, EXTENDED RELEASE ORAL
Qty: 90 TABLET | Refills: 1 | Status: SHIPPED | OUTPATIENT
Start: 2020-01-13 | End: 2020-06-29

## 2020-01-30 ENCOUNTER — OFFICE VISIT (OUTPATIENT)
Dept: SURGERY | Facility: CLINIC | Age: 65
End: 2020-01-30
Payer: COMMERCIAL

## 2020-01-30 VITALS — BODY MASS INDEX: 27.28 KG/M2 | HEIGHT: 68 IN | WEIGHT: 180 LBS

## 2020-01-30 DIAGNOSIS — I83.819 VARICOSE VEINS WITH PAIN: ICD-10-CM

## 2020-01-30 DIAGNOSIS — I83.899 VARICOSE VEINS WITH COMPLICATIONS: Primary | ICD-10-CM

## 2020-01-30 PROCEDURE — 36471 NJX SCLRSNT MLT INCMPTNT VN: CPT | Performed by: SURGERY

## 2020-01-31 NOTE — PROCEDURES
Date of Procedure:        1-    Pre op diagnosis: Varicose Veins-reticular veins-spider angiomata    Post op diagnosis: Same    Procedure: Injection sclerotherapy of Varicose Veins- bilateral    Surgeon:  Yumiko Cuevas    History of present illness:    This patient remained in stable condition after the procedure and was observed in our office after the procedure for an appropriate period of time. Discharge and after-care directions were given.   The patient states understanding and has no questions at this t

## 2020-01-31 NOTE — PROGRESS NOTES
Post Operative Visit Note       Active Problems  1. Varicose veins with complications    2. Varicose veins with pain         Chief Complaint   Patient presents with:  Varicose Veins: Established pt for 4th vein injection.        Allergies  Refugia Lombard is allergi Fluocinolone Acetonide 0.01 % Otic Oil, 1 drop by Each ear route twice a week.  , Disp: , Rfl: 4  •  TURMERIC OR, Take 1 tablet by mouth daily. , Disp: , Rfl:   •  valACYclovir HCl 1 G Oral Tab, Take 1 tablet (1,000 mg total) by mouth daily. , Disp: 90 tab Effort normal and breath sounds normal.        Abdominal: Soft. Bowel sounds are normal. She exhibits no distension. There is no tenderness. Musculoskeletal: Normal range of motion. General: No deformity.      Neurological: She is alert and oriented

## 2020-02-04 ENCOUNTER — TELEPHONE (OUTPATIENT)
Dept: SURGERY | Facility: CLINIC | Age: 65
End: 2020-02-04

## 2020-02-04 NOTE — TELEPHONE ENCOUNTER
Contacted PURVI GUILLEN regarding additional visits for varicose vein injections. Leola Nicholson at [facility] stated no authorization or precertification is needed for her plan. The ref # is [id number]. I will inform patient.

## 2020-02-08 ENCOUNTER — HOSPITAL ENCOUNTER (OUTPATIENT)
Dept: MAMMOGRAPHY | Facility: HOSPITAL | Age: 65
Discharge: HOME | End: 2020-02-08
Attending: INTERNAL MEDICINE
Payer: COMMERCIAL

## 2020-02-08 DIAGNOSIS — Z12.31 BREAST CANCER SCREENING BY MAMMOGRAM: ICD-10-CM

## 2020-02-08 PROCEDURE — 77063 BREAST TOMOSYNTHESIS BI: CPT | Performed by: INTERNAL MEDICINE

## 2020-02-08 PROCEDURE — 77067 SCR MAMMO BI INCL CAD: CPT | Performed by: INTERNAL MEDICINE

## 2020-02-20 ENCOUNTER — OFFICE VISIT (OUTPATIENT)
Dept: SURGERY | Facility: CLINIC | Age: 65
End: 2020-02-20
Payer: COMMERCIAL

## 2020-02-20 VITALS — BODY MASS INDEX: 27.28 KG/M2 | HEIGHT: 68 IN | WEIGHT: 180 LBS | TEMPERATURE: 98 F

## 2020-02-20 DIAGNOSIS — I83.819 VARICOSE VEINS WITH PAIN: ICD-10-CM

## 2020-02-20 DIAGNOSIS — I83.899 VARICOSE VEINS WITH COMPLICATIONS: Primary | ICD-10-CM

## 2020-02-20 DIAGNOSIS — Z86.79 S/P SCLEROTHERAPY OF VARICOSE VEINS: ICD-10-CM

## 2020-02-20 DIAGNOSIS — Z98.890 S/P SCLEROTHERAPY OF VARICOSE VEINS: ICD-10-CM

## 2020-02-20 PROCEDURE — 36475 ENDOVENOUS RF 1ST VEIN: CPT | Performed by: SURGERY

## 2020-02-21 NOTE — PROCEDURES
Date of Procedure:        February 20, 2020    Pre op diagnosis: Varicose Veins-reticular veins-spider angiomata    Post op diagnosis: Same    Procedure: Injection sclerotherapy of Varicose Veins-bilateral    Surgeon:  Tacos Major    History of present illness: after the procedure and was observed in our office after the procedure for an appropriate period of time. Discharge and after-care directions were given. The patient states understanding and has no questions at this time.   A follow up appointment in two

## 2020-02-21 NOTE — PROGRESS NOTES
Post Operative Visit Note       Active Problems  1. Varicose veins with complications    2. Varicose veins with pain    3. S/P sclerotherapy of varicose veins         Chief Complaint   Patient presents with:  Varicose Veins: 5th vein injection.        Marin Jimenez tablet, Rfl: 1  •  Fluocinolone Acetonide 0.01 % Otic Oil, 1 drop by Each ear route twice a week.  , Disp: , Rfl: 4  •  TURMERIC OR, Take 1 tablet by mouth daily.   , Disp: , Rfl:   •  valACYclovir HCl 1 G Oral Tab, Take 1 tablet (1,000 mg total) by mouth d

## 2020-02-29 DIAGNOSIS — A60.00 HERPES SIMPLEX INFECTION OF GENITOURINARY SYSTEM: Primary | ICD-10-CM

## 2020-03-03 ENCOUNTER — TELEPHONE (OUTPATIENT)
Dept: INTERNAL MEDICINE CLINIC | Facility: CLINIC | Age: 65
End: 2020-03-03

## 2020-03-03 RX ORDER — VALACYCLOVIR HYDROCHLORIDE 1 G/1
TABLET, FILM COATED ORAL
Qty: 90 TABLET | Refills: 2 | Status: SHIPPED | OUTPATIENT
Start: 2020-03-03 | End: 2020-12-03

## 2020-03-03 NOTE — TELEPHONE ENCOUNTER
Patient has questions about RaNITidine HCl 300 MG Oral Tab before she refills it. She keeps hearing the medication causes cancer. Please advise. Thank you!

## 2020-03-04 NOTE — TELEPHONE ENCOUNTER
Lm for pt to call back - pt has questions regarding her ranitidine. Looks like pt due for labs as well.   Last OV 11/23/19

## 2020-03-09 NOTE — TELEPHONE ENCOUNTER
Spoke with pt, pt concerned about what she has heard about ranitidine. Explained the limited recall, advised she call pharmacy for peace of mind to be sure her lot has not been affected. Pt stated understanding.  Reminded pt she has outstanding fasting labs

## 2020-03-16 RX ORDER — RANITIDINE 300 MG/1
300 TABLET ORAL NIGHTLY PRN
Qty: 90 TABLET | Refills: 3 | Status: SHIPPED | OUTPATIENT
Start: 2020-03-16 | End: 2020-04-15 | Stop reason: ALTCHOICE

## 2020-03-31 ENCOUNTER — TELEPHONE (OUTPATIENT)
Dept: INTERNAL MEDICINE CLINIC | Facility: CLINIC | Age: 65
End: 2020-03-31

## 2020-03-31 NOTE — TELEPHONE ENCOUNTER
Patient said her right nostril is blocked and she wants to try to avoid getting a sinus infection. She is wondering if she can take Sudafed with her BP medication. Or if Claritin would be better. Please advise. Thank you!

## 2020-03-31 NOTE — TELEPHONE ENCOUNTER
Spoke with pt. Worried has sinus infection and possible ear infection starting, trying to prevent progression. Since yesterday right nostril \"blocked\", face feels like its \"a little bruised\"/sensitive in sinus areas.  Right ear looks slightly puffy -st

## 2020-03-31 NOTE — TELEPHONE ENCOUNTER
Lm for pt to call back. Advised in voicemail -okay per [facility] consent - no decongestant (ie Sudafed) d/t her htn, but to call back to discuss symptoms.

## 2020-04-15 ENCOUNTER — TELEPHONE (OUTPATIENT)
Dept: INTERNAL MEDICINE CLINIC | Facility: CLINIC | Age: 65
End: 2020-04-15

## 2020-04-15 ENCOUNTER — VIRTUAL PHONE E/M (OUTPATIENT)
Dept: INTERNAL MEDICINE CLINIC | Facility: CLINIC | Age: 65
End: 2020-04-15
Payer: COMMERCIAL

## 2020-04-15 DIAGNOSIS — K21.9 GASTROESOPHAGEAL REFLUX DISEASE WITHOUT ESOPHAGITIS: Primary | ICD-10-CM

## 2020-04-15 PROCEDURE — 99213 OFFICE O/P EST LOW 20 MIN: CPT | Performed by: NURSE PRACTITIONER

## 2020-04-15 RX ORDER — FAMOTIDINE 20 MG/1
20 TABLET ORAL NIGHTLY PRN
Qty: 30 TABLET | Refills: 2 | Status: SHIPPED | OUTPATIENT
Start: 2020-04-15 | End: 2020-07-14

## 2020-04-15 NOTE — PROGRESS NOTES
Virtual Telephone Check-In    Angel Fregoso verbally consents to a Virtual/Telephone Check-In visit on 04/15/20.     Patient understands and accepts financial responsibility for any deductible, co-insurance and/or co-pays associated with this servic and holter 2015   • Retinal holes 2009    OS   • Retinal vein occlusion 6/8/2012   • SVT (supraventricular tachycardia) ([facility] [address] 9/11/2012   • Varicose veins 05/2003      Social History:  Social History    Tobacco Use      Smoking status: Never Smoker      Sm

## 2020-04-15 NOTE — TELEPHONE ENCOUNTER
Patient received notification that the FDA has instructed patients to stop taking raNITIdine. She would like to talk to a nurse/Dr Deidre Malin about an alternative. Please advise. Thank you!

## 2020-04-15 NOTE — TELEPHONE ENCOUNTER
Virtual/Telephone Check-In    Gilda Patel Keven verbally consents to a Virtual/Telephone Check-In service on 4/15/2020. Patient understands and accepts financial responsibility for any deductible, co-insurance and/or co-pays associated with this service.

## 2020-06-27 DIAGNOSIS — I10 ESSENTIAL HYPERTENSION: ICD-10-CM

## 2020-06-29 RX ORDER — METOPROLOL SUCCINATE 25 MG/1
TABLET, EXTENDED RELEASE ORAL
Qty: 90 TABLET | Refills: 0 | Status: SHIPPED | OUTPATIENT
Start: 2020-06-29 | End: 2020-10-19

## 2020-06-29 RX ORDER — LISINOPRIL AND HYDROCHLOROTHIAZIDE 25; 20 MG/1; MG/1
TABLET ORAL
Qty: 90 TABLET | Refills: 0 | Status: SHIPPED | OUTPATIENT
Start: 2020-06-29 | End: 2020-10-24

## 2020-06-29 NOTE — TELEPHONE ENCOUNTER
Last OV relevant to medication: 4/15/20 GERD                                                 11/23/19-PE  Last refill date: 1/13/2020     #90/refills:1  When pt was asked to return for OV: 1 year from 11/23/19  Upcoming appt/reason: No appt scheduled.   Spo

## 2020-09-09 LAB
ALBUMIN/GLOBULIN RATIO: 1.8 (CALC) (ref 1–2.5)
ALBUMIN: 4.2 G/DL (ref 3.6–5.1)
ALKALINE PHOSPHATASE: 60 U/L (ref 37–153)
ALT: 13 U/L (ref 6–29)
AST: 15 U/L (ref 10–35)
BILIRUBIN, TOTAL: 0.6 MG/DL (ref 0.2–1.2)
BUN: 22 MG/DL (ref 7–25)
CALCIUM: 9.6 MG/DL (ref 8.6–10.4)
CARBON DIOXIDE: 31 MMOL/L (ref 20–32)
CHLORIDE: 101 MMOL/L (ref 98–110)
CHOL/HDLC RATIO: 3.4 (CALC)
CHOLESTEROL, TOTAL: 240 MG/DL
CREATININE: 0.79 MG/DL (ref 0.5–0.99)
EGFR IF AFRICN AM: 92 ML/MIN/1.73M2
EGFR IF NONAFRICN AM: 80 ML/MIN/1.73M2
GLOBULIN: 2.3 G/DL (CALC) (ref 1.9–3.7)
GLUCOSE: 87 MG/DL (ref 65–99)
HDL CHOLESTEROL: 70 MG/DL
LDL-CHOLESTEROL: 152 MG/DL (CALC)
NON-HDL CHOLESTEROL: 170 MG/DL (CALC)
POTASSIUM: 4.1 MMOL/L (ref 3.5–5.3)
PROTEIN, TOTAL: 6.5 G/DL (ref 6.1–8.1)
SODIUM: 140 MMOL/L (ref 135–146)
TRIGLYCERIDES: 81 MG/DL
TSH W/REFLEX TO FT4: 1.38 MIU/L (ref 0.4–4.5)

## 2020-09-11 ENCOUNTER — TELEPHONE (OUTPATIENT)
Dept: INTERNAL MEDICINE CLINIC | Facility: CLINIC | Age: 65
End: 2020-09-11

## 2020-09-11 NOTE — TELEPHONE ENCOUNTER
Pt scheduled appt for cholesterol on 10/19, needed later time appt.  Please let pt know if it should be sooner     Also scheduled pt for Pe on 12/3/20    Thank you

## 2020-09-11 NOTE — TELEPHONE ENCOUNTER
PSR - pt already reviewed mychart result note. Please follow up with her to schedule appt to discuss cholesterol treatment. Thank you!

## 2020-09-16 ENCOUNTER — IMMUNIZATION (OUTPATIENT)
Dept: INTERNAL MEDICINE CLINIC | Facility: CLINIC | Age: 65
End: 2020-09-16
Payer: COMMERCIAL

## 2020-09-16 DIAGNOSIS — Z23 NEED FOR VACCINATION: ICD-10-CM

## 2020-09-16 PROBLEM — H25.13 AGE-RELATED NUCLEAR CATARACT OF BOTH EYES: Status: ACTIVE | Noted: 2018-07-17

## 2020-09-16 PROBLEM — H43.813 POSTERIOR VITREOUS DETACHMENT OF BOTH EYES: Status: ACTIVE | Noted: 2020-06-06

## 2020-09-16 PROCEDURE — 90686 IIV4 VACC NO PRSV 0.5 ML IM: CPT | Performed by: INTERNAL MEDICINE

## 2020-09-16 PROCEDURE — 90471 IMMUNIZATION ADMIN: CPT | Performed by: INTERNAL MEDICINE

## 2020-10-19 ENCOUNTER — OFFICE VISIT (OUTPATIENT)
Dept: INTERNAL MEDICINE CLINIC | Facility: CLINIC | Age: 65
End: 2020-10-19
Payer: COMMERCIAL

## 2020-10-19 ENCOUNTER — TELEPHONE (OUTPATIENT)
Dept: INTERNAL MEDICINE CLINIC | Facility: CLINIC | Age: 65
End: 2020-10-19

## 2020-10-19 VITALS
BODY MASS INDEX: 28.95 KG/M2 | DIASTOLIC BLOOD PRESSURE: 78 MMHG | TEMPERATURE: 98 F | OXYGEN SATURATION: 96 % | RESPIRATION RATE: 14 BRPM | HEIGHT: 68 IN | WEIGHT: 191 LBS | SYSTOLIC BLOOD PRESSURE: 124 MMHG | HEART RATE: 88 BPM

## 2020-10-19 DIAGNOSIS — T44.7X5A: ICD-10-CM

## 2020-10-19 DIAGNOSIS — I10 ESSENTIAL HYPERTENSION: Primary | ICD-10-CM

## 2020-10-19 DIAGNOSIS — E78.00 HIGH CHOLESTEROL: ICD-10-CM

## 2020-10-19 PROCEDURE — 99213 OFFICE O/P EST LOW 20 MIN: CPT | Performed by: INTERNAL MEDICINE

## 2020-10-19 PROCEDURE — 3074F SYST BP LT 130 MM HG: CPT | Performed by: INTERNAL MEDICINE

## 2020-10-19 PROCEDURE — 3078F DIAST BP <80 MM HG: CPT | Performed by: INTERNAL MEDICINE

## 2020-10-19 PROCEDURE — 3008F BODY MASS INDEX DOCD: CPT | Performed by: INTERNAL MEDICINE

## 2020-10-19 RX ORDER — MELATONIN
1000 DAILY
COMMUNITY
End: 2020-12-03

## 2020-10-19 RX ORDER — DILTIAZEM HYDROCHLORIDE 120 MG/1
120 CAPSULE, COATED, EXTENDED RELEASE ORAL DAILY
Qty: 90 CAPSULE | Refills: 1 | Status: SHIPPED | OUTPATIENT
Start: 2020-10-19 | End: 2021-04-09

## 2020-10-19 NOTE — TELEPHONE ENCOUNTER
PT calling and said that she stubbed her little toe on her left foot yesterday and that it hurts and is really bruised and swollen.  Pt is wondering what she should do and if she should come in or if there is some at home things she can do for the time taqueria

## 2020-10-19 NOTE — PROGRESS NOTES
Mario Martinez is a 63year old female  Patient presents with:  Lab Results: Review Lab Results - Cholesterol      HPI:   High cholesterol and HTN  She does not like her metoprolol- makes her alcohol intolerant and blunts her HR response  She tried to s otherwise      EXAM:   /78 (BP Location: Left arm, Patient Position: Sitting, Cuff Size: adult)   Pulse 88   Temp 97.6 °F (36.4 °C) (Temporal)   Resp 14   Ht 68\"   Wt 191 lb (86.6 kg)   SpO2 96%   BMI 29.04 kg/m²   GENERAL: well developed, well nour

## 2020-10-24 DIAGNOSIS — I10 ESSENTIAL HYPERTENSION: ICD-10-CM

## 2020-10-24 RX ORDER — LISINOPRIL AND HYDROCHLOROTHIAZIDE 25; 20 MG/1; MG/1
TABLET ORAL
Qty: 90 TABLET | Refills: 0 | Status: SHIPPED | OUTPATIENT
Start: 2020-10-24 | End: 2021-01-26

## 2020-10-29 ENCOUNTER — TELEPHONE (OUTPATIENT)
Dept: INTERNAL MEDICINE CLINIC | Facility: CLINIC | Age: 65
End: 2020-10-29

## 2020-10-29 DIAGNOSIS — M79.89 PAIN AND SWELLING OF TOE OF LEFT FOOT: Primary | ICD-10-CM

## 2020-10-29 DIAGNOSIS — M79.675 PAIN AND SWELLING OF TOE OF LEFT FOOT: Primary | ICD-10-CM

## 2020-10-29 NOTE — TELEPHONE ENCOUNTER
See note below. Pt still c/o toe pain & swelling. Pt states saw Dr. Torres Gold re: broken toe a week ago. Noted OV 10/19/2020, but no notes found discussing broken toe. Also noted Patient Message encounter, also on 10/19/2020.   Pt sent a message wi

## 2020-10-29 NOTE — TELEPHONE ENCOUNTER
Pt calling and said that she broke her toe two and a half weeks ago and saw  a week ago for it and is doing everything she was told to do.  Pt said that it still hurts really bad and hard to walk on and is wondering if she needs an Xray or if this i

## 2020-10-29 NOTE — TELEPHONE ENCOUNTER
Referral placed & MyChart message sent to pt with info    Lloyd Crew[address]  [address]  [address]

## 2020-10-30 ENCOUNTER — TELEPHONE (OUTPATIENT)
Dept: ORTHOPEDICS CLINIC | Facility: CLINIC | Age: 65
End: 2020-10-30

## 2020-10-30 NOTE — TELEPHONE ENCOUNTER
New patient requested to be seen as soon as possible. Patient stated she thinks her smallest toe on her left foot may be broken and is in pain. Patient scheduled for today at 12:45p.m. but has no imaging. Please call back if patient should arrive early to complete x-rays.

## 2020-11-03 ENCOUNTER — HOSPITAL ENCOUNTER (OUTPATIENT)
Dept: GENERAL RADIOLOGY | Age: 65
Discharge: HOME | End: 2020-11-03
Attending: PODIATRIST
Payer: COMMERCIAL

## 2020-11-03 ENCOUNTER — OFFICE VISIT (OUTPATIENT)
Dept: ORTHOPEDICS CLINIC | Facility: CLINIC | Age: 65
End: 2020-11-03
Payer: COMMERCIAL

## 2020-11-03 DIAGNOSIS — M79.672 LEFT FOOT PAIN: Primary | ICD-10-CM

## 2020-11-03 DIAGNOSIS — M79.672 LEFT FOOT PAIN: ICD-10-CM

## 2020-11-03 DIAGNOSIS — S92.512A CLOSED DISPLACED FRACTURE OF PROXIMAL PHALANX OF LESSER TOE OF LEFT FOOT, INITIAL ENCOUNTER: ICD-10-CM

## 2020-11-03 PROCEDURE — L3260 AMBULATORY SURGICAL BOOT EAC: HCPCS | Performed by: PODIATRIST

## 2020-11-03 PROCEDURE — 99203 OFFICE O/P NEW LOW 30 MIN: CPT | Performed by: PODIATRIST

## 2020-11-03 PROCEDURE — 73630 X-RAY EXAM OF FOOT: CPT | Performed by: PODIATRIST

## 2020-11-03 NOTE — H&P
[facility] New Patient Note    CC: Patient presents with: Foot Pain: hurt her foot on 10/18/20 kicking her leg on table. States it has been hurting since.       HPI: This 63year old female presents today with complaints of ***    Past Medical His Family History   Problem Relation Age of Onset   • Other (Alzheimer's) Father    • Other (htn) Father    • Glaucoma Mother    • Other (skin cancer) Mother    • Heart Attack Maternal Grandfather    • Thyroid Disorder Sister    • Other (severe hypertension

## 2020-11-03 NOTE — H&P
[facility] New Patient Note    CC: Patient presents with: Foot Pain: hurt her foot on 10/18/20 kicking her leg on table. States it has been hurting since.       HPI: This 63year old female presents today with complaints of bedpost type injury to Vitamins-Minerals (EYE VITAMINS) Oral Cap Take 1 capsule by mouth daily.          Amoxil                    Ciprofloxacin             Pcns [Penicillins]        Family History   Problem Relation Age of Onset   • Other (Alzheimer's) Father    • Other (htn) Fa

## 2020-11-11 ENCOUNTER — TELEPHONE (OUTPATIENT)
Dept: INTERNAL MEDICINE CLINIC | Facility: CLINIC | Age: 65
End: 2020-11-11

## 2020-11-11 NOTE — TELEPHONE ENCOUNTER
Pt called and would like a call back to talk about getting some tests ordered. Pt was very vague about what she needed and would not say what test she needed. Pt just wants to talk to a nurse.    Please advise   Thank you

## 2020-11-13 NOTE — TELEPHONE ENCOUNTER
Spoke to pt. Pt states daughter driving home from college from [facility] for thanksgiving. Dtr going to be tested 2 days prior to leaving there.  is dx leukemia. Pt is worried & wants daughter to get tested again as soon as she gets home.   Pt states d

## 2020-11-13 NOTE — TELEPHONE ENCOUNTER
Spoke to pt. Made aware [employer] message was sent to reference to. Explained that Dr. Mary Ann Ratliff ordered test for daughter  But also offered sites where they do rapid testing. Pt voiced understanding.

## 2020-11-28 ENCOUNTER — HOSPITAL ENCOUNTER (OUTPATIENT)
Dept: GENERAL RADIOLOGY | Facility: HOSPITAL | Age: 65
Discharge: HOME | End: 2020-11-28
Attending: PODIATRIST
Payer: COMMERCIAL

## 2020-11-28 DIAGNOSIS — S92.512A CLOSED DISPLACED FRACTURE OF PROXIMAL PHALANX OF LESSER TOE OF LEFT FOOT, INITIAL ENCOUNTER: ICD-10-CM

## 2020-11-28 PROCEDURE — 73630 X-RAY EXAM OF FOOT: CPT | Performed by: PODIATRIST

## 2020-12-03 ENCOUNTER — OFFICE VISIT (OUTPATIENT)
Dept: INTERNAL MEDICINE CLINIC | Facility: CLINIC | Age: 65
End: 2020-12-03
Payer: COMMERCIAL

## 2020-12-03 VITALS
TEMPERATURE: 98 F | HEART RATE: 88 BPM | HEIGHT: 68.25 IN | OXYGEN SATURATION: 99 % | SYSTOLIC BLOOD PRESSURE: 120 MMHG | WEIGHT: 192.81 LBS | BODY MASS INDEX: 29.22 KG/M2 | DIASTOLIC BLOOD PRESSURE: 84 MMHG | RESPIRATION RATE: 16 BRPM

## 2020-12-03 DIAGNOSIS — E78.00 HIGH CHOLESTEROL: ICD-10-CM

## 2020-12-03 DIAGNOSIS — I10 ESSENTIAL HYPERTENSION: ICD-10-CM

## 2020-12-03 DIAGNOSIS — Z12.31 ENCOUNTER FOR SCREENING MAMMOGRAM FOR BREAST CANCER: ICD-10-CM

## 2020-12-03 DIAGNOSIS — Z00.00 ROUTINE GENERAL MEDICAL EXAMINATION AT A HEALTH CARE FACILITY: Primary | ICD-10-CM

## 2020-12-03 PROCEDURE — 3008F BODY MASS INDEX DOCD: CPT | Performed by: INTERNAL MEDICINE

## 2020-12-03 PROCEDURE — 3079F DIAST BP 80-89 MM HG: CPT | Performed by: INTERNAL MEDICINE

## 2020-12-03 PROCEDURE — 3074F SYST BP LT 130 MM HG: CPT | Performed by: INTERNAL MEDICINE

## 2020-12-03 PROCEDURE — 99396 PREV VISIT EST AGE 40-64: CPT | Performed by: INTERNAL MEDICINE

## 2020-12-03 RX ORDER — VALACYCLOVIR HYDROCHLORIDE 1 G/1
TABLET, FILM COATED ORAL
Qty: 90 TABLET | Refills: 3 | Status: SHIPPED | OUTPATIENT
Start: 2020-12-03 | End: 2021-12-11

## 2020-12-03 NOTE — PROGRESS NOTES
HPI:   Anette Li is a 63year old female who presents for a complete physical exam.     I also manage her HTN aand recently switched her BB to CCB d/t blunted HR response. She is feeling quite well on that.   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       Meds & Refills for this Visit:  Requested Prescriptions     Pending Prescriptions Disp Refills   • valACYclovir HCl 1 G Oral Tab 90 tablet 0     Sig: TAKE 1 TABLET(1000 MG) BY MOUTH DAILY       Imaging & Consults:  MYRA GRISSOM 2

## 2020-12-04 ENCOUNTER — TELEPHONE (OUTPATIENT)
Dept: INTERNAL MEDICINE CLINIC | Facility: CLINIC | Age: 65
End: 2020-12-04

## 2020-12-04 ENCOUNTER — TELEMEDICINE (OUTPATIENT)
Dept: ORTHOPEDICS CLINIC | Facility: CLINIC | Age: 65
End: 2020-12-04

## 2020-12-04 DIAGNOSIS — S92.515D CLOSED NONDISPLACED FRACTURE OF PROXIMAL PHALANX OF LESSER TOE OF LEFT FOOT WITH ROUTINE HEALING, SUBSEQUENT ENCOUNTER: Primary | ICD-10-CM

## 2020-12-04 PROCEDURE — 99213 OFFICE O/P EST LOW 20 MIN: CPT | Performed by: PODIATRIST

## 2020-12-04 NOTE — TELEPHONE ENCOUNTER
Spoke to pt. Pt states she was in for a PE yesterday & forgot to ask question. Pt states she was given a low dose 2mg THC chewable mint from a friend. Pt asking if she can take that with her BP meds.   Pt also asking if she can use CBD (drops under the t

## 2020-12-04 NOTE — PROGRESS NOTES
[facility] Progress Note    Subjective: Televisit today with the patient she has now about 6 weeks post injury to the fifth toe left foot she did have x-rays done last week.   Overall she is feeling pretty good, having a little bit of swelling but

## 2021-01-26 ENCOUNTER — TELEPHONE (OUTPATIENT)
Dept: INTERNAL MEDICINE CLINIC | Facility: CLINIC | Age: 66
End: 2021-01-26

## 2021-01-26 DIAGNOSIS — I10 ESSENTIAL HYPERTENSION: ICD-10-CM

## 2021-01-26 RX ORDER — LISINOPRIL AND HYDROCHLOROTHIAZIDE 25; 20 MG/1; MG/1
1 TABLET ORAL DAILY
Qty: 90 TABLET | Refills: 1 | Status: SHIPPED | OUTPATIENT
Start: 2021-01-26 | End: 2021-07-30

## 2021-01-26 NOTE — TELEPHONE ENCOUNTER
Patient has pain in her left shoulder and is wondering if she should see Dr Samantha Suresh or an orthopedic doctor. Patient would also like Dr Mal Llanes recommendation for a counselor. Please advise. Thank you!

## 2021-01-27 NOTE — TELEPHONE ENCOUNTER
PSR - Please schedule pt for OV for evaluation. Pt has not been evaluated for left shoulder pain. Behavioral Health can also be addressed. Noted spouse  recently. Referrals to be given at [address] Please schedule, thanks!

## 2021-01-28 NOTE — PROGRESS NOTES
Lalito Marcus is a 63year old female  Patient presents with:  Shoulder Pain: 3-4 weeks  Referral: [facility]      HPI:   Her   a couple of weeks ago from leukemia  Pt would like a recommendation for grief counseling and anxiety about day-day status: Never Smoker      Smokeless tobacco: Never Used    Alcohol use: No      Alcohol/week: 0.0 standard drinks    Drug use: No     Patient Active Problem List:     CRVO (central retinal vein occlusion)     HTN (hypertension)     Herpes simplex infection are no Patient Instructions on file for this visit. The patient indicates understanding of these issues and agrees to the plan.

## 2021-01-29 ENCOUNTER — TELEPHONE (OUTPATIENT)
Dept: ORTHOPEDICS CLINIC | Facility: CLINIC | Age: 66
End: 2021-01-29

## 2021-01-29 ENCOUNTER — TELEPHONE (OUTPATIENT)
Dept: INTERNAL MEDICINE CLINIC | Facility: CLINIC | Age: 66
End: 2021-01-29

## 2021-01-29 DIAGNOSIS — M25.511 RIGHT SHOULDER PAIN, UNSPECIFIED CHRONICITY: Primary | ICD-10-CM

## 2021-01-29 NOTE — TELEPHONE ENCOUNTER
Patient will be a new patient referred by pcp. Patient is coming in for rt shoulder pain and has no x-rays or mri . Please call patient if x-ray is needed, and place order.

## 2021-01-29 NOTE — TELEPHONE ENCOUNTER
On 1/29, the following referrals for therapy were provided to the patient:     Hina Luu, Therapist, Roberto Mcmahon, Therapist, [facility]   Timur, Therapist, Timur CANO

## 2021-02-02 ENCOUNTER — HOSPITAL ENCOUNTER (OUTPATIENT)
Dept: GENERAL RADIOLOGY | Facility: HOSPITAL | Age: 66
Discharge: HOME | End: 2021-02-02
Attending: ORTHOPAEDIC SURGERY
Payer: COMMERCIAL

## 2021-02-02 ENCOUNTER — TELEPHONE (OUTPATIENT)
Dept: PHYSICAL THERAPY | Facility: HOSPITAL | Age: 66
End: 2021-02-02

## 2021-02-02 ENCOUNTER — OFFICE VISIT (OUTPATIENT)
Dept: ORTHOPEDICS CLINIC | Facility: CLINIC | Age: 66
End: 2021-02-02
Payer: COMMERCIAL

## 2021-02-02 ENCOUNTER — TELEPHONE (OUTPATIENT)
Dept: ORTHOPEDICS CLINIC | Facility: CLINIC | Age: 66
End: 2021-02-02

## 2021-02-02 VITALS — OXYGEN SATURATION: 99 % | HEART RATE: 80 BPM

## 2021-02-02 DIAGNOSIS — M25.512 LEFT SHOULDER PAIN, UNSPECIFIED CHRONICITY: ICD-10-CM

## 2021-02-02 DIAGNOSIS — M25.512 LEFT SHOULDER PAIN, UNSPECIFIED CHRONICITY: Primary | ICD-10-CM

## 2021-02-02 DIAGNOSIS — M25.511 RIGHT SHOULDER PAIN, UNSPECIFIED CHRONICITY: ICD-10-CM

## 2021-02-02 DIAGNOSIS — M75.02 ADHESIVE CAPSULITIS OF LEFT SHOULDER: ICD-10-CM

## 2021-02-02 PROCEDURE — 99203 OFFICE O/P NEW LOW 30 MIN: CPT | Performed by: ORTHOPAEDIC SURGERY

## 2021-02-02 PROCEDURE — 73030 X-RAY EXAM OF SHOULDER: CPT | Performed by: ORTHOPAEDIC SURGERY

## 2021-02-02 RX ORDER — MELOXICAM 15 MG/1
15 TABLET ORAL DAILY
Qty: 14 TABLET | Refills: 1 | Status: SHIPPED | OUTPATIENT
Start: 2021-02-02 | End: 2021-08-02 | Stop reason: ALTCHOICE

## 2021-02-02 NOTE — H&P
[facility] - ORTHOPEDICS  [place] [id number]  [phone]     NEW PATIENT VISIT - HISTORY AND PHYSICAL EXAMINATION     Name: Laretta Runner   MRN: [id number]  Date: 2/2/2021     CC: Left should Performed by Anila Cowan MD at [address] [place] [address] HX:  Family History   Problem Relation Age of Onset   • Other (Alzheimer's) Father    • Other (htn) Father    • Glaucoma Mother    • Other (skin cancer) Mother    • Heart Attack M Musculoskeletal: Normal range of motion and neck supple. Cardiovascular:      Pulses: Normal pulses. Pulmonary:      Effort: Pulmonary effort is normal. No respiratory distress. Abdominal:      General: There is no distension.    Skin:     General: PROCEDURE:  XR SHOULDER, COMPLETE (MIN 2 VIEWS), LEFT (CPT=73030)     TECHNIQUE:  Multiple views were obtained. COMPARISON:  None.   INDICATIONS:  M25.512 Left shoulder pain, unspecified chronicity  PATIENT STATED HISTORY: (As transcribed by Technologist) [facility]. Radha Roy. [email or web address]  t: [phone]  o: [phone]  f: [phone]        This note was dictated using Dragon software.   While it was briefly proofread prior to completion, some grammatical, spelling, and word choice errors due to [facility]

## 2021-02-03 ENCOUNTER — OFFICE VISIT (OUTPATIENT)
Dept: PHYSICAL THERAPY | Facility: HOSPITAL | Age: 66
End: 2021-02-03
Attending: ORTHOPAEDIC SURGERY
Payer: COMMERCIAL

## 2021-02-03 DIAGNOSIS — M75.02 ADHESIVE CAPSULITIS OF LEFT SHOULDER: ICD-10-CM

## 2021-02-03 PROCEDURE — 97110 THERAPEUTIC EXERCISES: CPT

## 2021-02-03 PROCEDURE — 97162 PT EVAL MOD COMPLEX 30 MIN: CPT

## 2021-02-03 NOTE — PROGRESS NOTES
SHOULDER EVALUATION:   Referring Physician: Dr. Cyn Lizama  Diagnosis: left shoulder adhesive capsulitis.       Date of Service: 2/3/2021     PATIENT SUMMARY   Dustin Marcelino is a 63year old female who presents to therapy today with complaints of a 3-4 week is medically necessary to address the above impairments and reach functional goals. Precautions:  None  OBJECTIVE:   Observation/Posture: forward head protracted scapulas. Palpation: TTP left sub-acromial space, TM, UT and LS mm. .   Cervical Screen: be able to reach into overhead cabinets without pain or restriction   · Pt will improve shoulder abduction AROM to >120 degrees to improve ability to don deodorant, don/doff shirts, and wash hair   · Pt will increase shoulder AROM ER to 70 to be able to re

## 2021-02-10 ENCOUNTER — OFFICE VISIT (OUTPATIENT)
Dept: PHYSICAL THERAPY | Facility: HOSPITAL | Age: 66
End: 2021-02-10
Attending: ORTHOPAEDIC SURGERY
Payer: COMMERCIAL

## 2021-02-10 PROCEDURE — 97110 THERAPEUTIC EXERCISES: CPT

## 2021-02-10 PROCEDURE — 97140 MANUAL THERAPY 1/> REGIONS: CPT

## 2021-02-10 NOTE — PROGRESS NOTES
Dx: Left shoulder adhesive capsulitis.           Insurance (Authorized # of Visits):  15           Authorizing Physician: Dr. Bradley Sánchez  Next MD visit: none scheduled  Fall Risk: standard         Precautions: n/a             Subjective: Left shoulder pain is cu B shoulder shrugs x 10. B SR x 10. Standing rolling SB on table:    Into B SF x 10. Into L Scaption x 10. Supine cervical PROM by PT including right side bending.          stm left shoulder area including pectoralis tendon, biceps tendon

## 2021-02-17 ENCOUNTER — OFFICE VISIT (OUTPATIENT)
Dept: PHYSICAL THERAPY | Facility: HOSPITAL | Age: 66
End: 2021-02-17
Attending: ORTHOPAEDIC SURGERY
Payer: COMMERCIAL

## 2021-02-17 PROCEDURE — 97140 MANUAL THERAPY 1/> REGIONS: CPT

## 2021-02-17 PROCEDURE — 97110 THERAPEUTIC EXERCISES: CPT

## 2021-02-17 NOTE — PROGRESS NOTES
Dx: Left shoulder adhesive capsulitis.           Insurance (Authorized # of Visits):  15           Authorizing Physician: Dr. Jena López MD visit: none scheduled  Fall Risk: standard         Precautions: n/a             Subjective: Left shoulder pain is cu HEP to maintain progress achieved in PT     Plan: manual therapy, education, joint mobs, ROM, scapular-postural and rtc strengthening. ..   Date: 2/10/2021  TX#: 2/12 Date:  2/17/21               TX#: 3/12 Date:                 TX#: 4/ Date:

## 2021-02-19 ENCOUNTER — OFFICE VISIT (OUTPATIENT)
Dept: PHYSICAL THERAPY | Facility: HOSPITAL | Age: 66
End: 2021-02-19
Attending: ORTHOPAEDIC SURGERY
Payer: COMMERCIAL

## 2021-02-19 PROCEDURE — 97140 MANUAL THERAPY 1/> REGIONS: CPT

## 2021-02-19 PROCEDURE — 97110 THERAPEUTIC EXERCISES: CPT

## 2021-02-19 NOTE — PROGRESS NOTES
Dx: Left shoulder adhesive capsulitis.           Insurance (Authorized # of Visits):  15           Authorizing Physician: Dr. Hina Camilo  Next MD visit: none scheduled  Fall Risk: standard         Precautions: n/a             Subjective: Left shoulder pain is cu SR x 10. X 12,   X 12. X 12  X 12. Standing rolling SB on table:    Into B SF x 10. Into L Scaption x 10. Standing rolling SB on table:    Into B SF x 15. Into L Scaption x 15. Standing rolling SB on table:    Into B SF x 20.   Into L Scaption x

## 2021-02-24 ENCOUNTER — OFFICE VISIT (OUTPATIENT)
Dept: PHYSICAL THERAPY | Facility: HOSPITAL | Age: 66
End: 2021-02-24
Attending: ORTHOPAEDIC SURGERY
Payer: COMMERCIAL

## 2021-02-24 PROCEDURE — 97110 THERAPEUTIC EXERCISES: CPT

## 2021-02-24 PROCEDURE — 97140 MANUAL THERAPY 1/> REGIONS: CPT

## 2021-02-24 NOTE — PROGRESS NOTES
Dx: Left shoulder adhesive capsulitis.           Insurance (Authorized # of Visits):  15           Authorizing Physician: Dr. Anastasiya Wang  Next MD visit: none scheduled  Fall Risk: standard         Precautions: n/a             Subjective: Left shoulder pain is cu scapular-postural and rtc strengthening. .. Date: 2/10/2021  TX#: 2/12 Date:  2/17/21               TX#: 3/12 Date: 2/19/21                TX#: 4/12 Date:   2/24/21              TX#: 5/12 Date: Tx#: 6/   B shoulder shrugs x 10. B SR x 10.   X 12,   X 12 second holds x 8 each gently. Supine isometric L IR and ER 5 second holds x 8 each. X 8 reps each direction sub-maximally. TNE was provided throughout today's session.   TNE continued to be provided during today's session.  - X.           HEP: SS, SR

## 2021-02-26 ENCOUNTER — OFFICE VISIT (OUTPATIENT)
Dept: PHYSICAL THERAPY | Facility: HOSPITAL | Age: 66
End: 2021-02-26
Attending: ORTHOPAEDIC SURGERY
Payer: COMMERCIAL

## 2021-02-26 PROCEDURE — 97140 MANUAL THERAPY 1/> REGIONS: CPT

## 2021-02-26 PROCEDURE — 97110 THERAPEUTIC EXERCISES: CPT

## 2021-02-26 NOTE — PROGRESS NOTES
Dx: Left shoulder adhesive capsulitis.           Insurance (Authorized # of Visits):  15           Authorizing Physician: Dr. Cyn López MD visit: none scheduled  Fall Risk: standard         Precautions: n/a             Subjective: Patient reports that she 2/12 Date:  2/17/21               TX#: 3/12 Date: 2/19/21                TX#: 4/12 Date:   2/24/21              TX#: 5/12 Date: 2/26/21  Tx#: 6/12       B shoulder shrugs x 10. B SR x 10. X 12,   X 12. X 12  X 12. B SS x 12. B SR x 12. B SS x 12. While right side lying:   L scapula mobs by PT. While right side lying:    L scapular mobs by PT. AA scapula clocks x 10 each direction. L ER x 10. While right side lying:    L scapula mobs by PT.   L ER x 10. L SE by PT.   While right side lyi

## 2021-03-03 ENCOUNTER — OFFICE VISIT (OUTPATIENT)
Dept: PHYSICAL THERAPY | Facility: HOSPITAL | Age: 66
End: 2021-03-03
Attending: ORTHOPAEDIC SURGERY
Payer: COMMERCIAL

## 2021-03-03 PROCEDURE — 97110 THERAPEUTIC EXERCISES: CPT

## 2021-03-03 PROCEDURE — 97140 MANUAL THERAPY 1/> REGIONS: CPT

## 2021-03-03 NOTE — PROGRESS NOTES
Dx: Left shoulder adhesive capsulitis.           Insurance (Authorized # of Visits):  15           Authorizing Physician: Dr. Jesus Calderón  Next MD visit: none scheduled  Fall Risk: standard         Precautions: n/a             Subjective:  Left shoulder pain is c compliant with comprehensive HEP to maintain progress achieved in PT     Plan: manual therapy, education, joint mobs, ROM, scapular-postural and rtc strengthening. ..   Date: 2/10/2021  TX#: 2/12 Date:  2/17/21               TX#: 3/12 Date: 2/19/21 PT.  Grade II and III L GH posterior and interior mobs by PT. Grade II and III L GH posterior and inferior mobs by PT. Grade III L GH posterior and inferior mobs by PT. AA left shoulder IR/ER with PT guidance x 10 reps. . 2 x 10.  2 x 10. A L IR.

## 2021-03-05 ENCOUNTER — APPOINTMENT (OUTPATIENT)
Dept: PHYSICAL THERAPY | Facility: HOSPITAL | Age: 66
End: 2021-03-05
Attending: ORTHOPAEDIC SURGERY
Payer: COMMERCIAL

## 2021-03-10 ENCOUNTER — OFFICE VISIT (OUTPATIENT)
Dept: PHYSICAL THERAPY | Facility: HOSPITAL | Age: 66
End: 2021-03-10
Attending: ORTHOPAEDIC SURGERY
Payer: COMMERCIAL

## 2021-03-10 PROCEDURE — 97110 THERAPEUTIC EXERCISES: CPT

## 2021-03-10 PROCEDURE — 97140 MANUAL THERAPY 1/> REGIONS: CPT

## 2021-03-10 NOTE — PROGRESS NOTES
Dx: Left shoulder adhesive capsulitis.           Insurance (Authorized # of Visits):  15           Authorizing Physician: Dr. Daniel Elmore  Next MD visit: none scheduled  Fall Risk: standard         Precautions: n/a             Subjective:  Left shoulder pain is c 4/5 to promote improved shoulder mechanics and stabilization with lifting and reaching   · Pt will be independent and compliant with comprehensive HEP to maintain progress achieved in PT     Plan: manual therapy, education, joint mobs, ROM, scapular-postur by PT while supine. Left shoulder PROM by PT while supine. Supine left shoulder PROM by PT. Left shoulder PROM by PT while supine. Grade II and III L GH posterior and inferior mobs by PT. Grade II and III L GH posterior and inferior mobs by PT.   G continues to be done during each session. TNE was discussed today. TNE was discussed. .. Seated cervical retraction x 5 reps with TC and VC given. HEP. Ergonomics at home was discussed.       HEP: SS, SR, protraction supine, and inferior shoulde

## 2021-03-19 ENCOUNTER — OFFICE VISIT (OUTPATIENT)
Dept: PHYSICAL THERAPY | Facility: HOSPITAL | Age: 66
End: 2021-03-19
Attending: ORTHOPAEDIC SURGERY
Payer: COMMERCIAL

## 2021-03-19 PROCEDURE — 97140 MANUAL THERAPY 1/> REGIONS: CPT

## 2021-03-19 PROCEDURE — 97110 THERAPEUTIC EXERCISES: CPT

## 2021-03-19 NOTE — PROGRESS NOTES
Dx: Left shoulder adhesive capsulitis.           Insurance (Authorized # of Visits):  15           Authorizing Physician: Dr. Sudeep Stout  Next MD visit: none scheduled  Fall Risk: standard         Precautions: n/a             Subjective:  Left shoulder pain rang independent and compliant with comprehensive HEP to maintain progress achieved in PT     Plan: manual therapy, education, joint mobs, ROM, scapular-postural and rtc strengthening. ..   Date: 2/10/2021  TX#: 2/12 Date:  2/17/21               TX#: 3/12 Date: 2 0# 2 x 10 with TC given. PROM left shoulder gently by PT. Juan M Reed PROM left shoulder by PT while supine. PROM left shoulder while supine by PT. Left shoulder PROM by PT while supine. Left shoulder PROM by PT while supine.   Supine left shoulder PROM by PT. IR/ER isometrics 5 second holds x 8 each gently. Supine isometric L IR and ER 5 second holds x 8 each. X 8 reps each direction sub-maximally. X 8 reps each sub-maximally. 2 x 8 each direction.   Supine isometric IR/ER 5 second holds 2 x 8 each directio

## 2021-04-07 ENCOUNTER — OFFICE VISIT (OUTPATIENT)
Dept: PHYSICAL THERAPY | Facility: HOSPITAL | Age: 66
End: 2021-04-07
Attending: ORTHOPAEDIC SURGERY
Payer: COMMERCIAL

## 2021-04-07 PROCEDURE — 97140 MANUAL THERAPY 1/> REGIONS: CPT

## 2021-04-07 PROCEDURE — 97110 THERAPEUTIC EXERCISES: CPT

## 2021-04-07 NOTE — PROGRESS NOTES
Dx: Left shoulder adhesive capsulitis.           Insurance (Authorized # of Visits):  15           Authorizing Physician: Dr. Kraig López MD visit: none scheduled  Fall Risk: standard         Precautions: n/a             Subjective:  States that her left ar mid/low trap strength to 4/5 to promote improved shoulder mechanics and stabilization with lifting and reaching   · Pt will be independent and compliant with comprehensive HEP to maintain progress achieved in PT     Plan: manual therapy, education, joint m PT while supine and while right side lying. X. stm left shoulder and c-t area by PT while supine. Supine left protraction x 10.  2 x 10. Supine left protraction 1# 2 x 10 with TC given. 2 x 10. Supine left protraction 0# 2 x 10.   2 x 10.  2 x 10. AA L scapular clocks x 10 reps each direction. L ER x 10. AA L SF x 10. L SE by PT. AA L Scaption x 10. While right side lying:  Left scapular mobs by PT. AA L scapular clocks x 10 reps each. ..  L ER x 10. AA L SF x 10. L SE by PT.      AA

## 2021-04-09 DIAGNOSIS — I10 ESSENTIAL HYPERTENSION: Primary | ICD-10-CM

## 2021-04-12 RX ORDER — DILTIAZEM HYDROCHLORIDE 120 MG/1
120 CAPSULE, COATED, EXTENDED RELEASE ORAL DAILY
Qty: 90 CAPSULE | Refills: 0 | Status: SHIPPED | OUTPATIENT
Start: 2021-04-12 | End: 2021-07-02

## 2021-04-12 NOTE — TELEPHONE ENCOUNTER
Patient called for a status on this prescription. Please call her when it has been sent to the pharmacy. Thank you!

## 2021-04-12 NOTE — TELEPHONE ENCOUNTER
Passes protocol. Rx approved for 90 day supply. Called pt back as requested to inform rx was sent. Pt voiced understanding.

## 2021-04-14 ENCOUNTER — OFFICE VISIT (OUTPATIENT)
Dept: PHYSICAL THERAPY | Facility: HOSPITAL | Age: 66
End: 2021-04-14
Attending: ORTHOPAEDIC SURGERY
Payer: COMMERCIAL

## 2021-04-14 PROCEDURE — 97110 THERAPEUTIC EXERCISES: CPT

## 2021-04-14 PROCEDURE — 97140 MANUAL THERAPY 1/> REGIONS: CPT

## 2021-04-14 NOTE — PROGRESS NOTES
Dx: Left shoulder adhesive capsulitis. Insurance (Authorized # of Visits):  15           Authorizing Physician: Dr. Yamile Duncan  Next MD visit: none scheduled/tbd. ..   Fall Risk: standard         Precautions: n/a             Subjective:  States that her improve function with lifting/carrying things.     · Pt will demonstrate increased mid/low trap strength to 4/5 to promote improved shoulder mechanics and stabilization with lifting and reaching   · Pt will be independent and compliant with comprehensive HE pectoralis tendon, biceps tendon, UT, LS and TM mm. .  stm left shoulder area by PT  Including pectoralis tendon, biceps tendon, UT, LS, and TM mm. .. stm left shoulder area and left c-t area by PT while supine, sitting, and right side lying.   X. stm left sh L ER x 10. While right side lying:    L scapula mobs by PT.   L ER x 10. L SE by PT. While right side lying:    L scapula mobs by PT.   L ER x 15. L SE by PT.   L AA SF x 10. While right side lying:   Left scapula mobs by PT.    AA left scapular cl

## 2021-04-21 ENCOUNTER — OFFICE VISIT (OUTPATIENT)
Dept: PHYSICAL THERAPY | Facility: HOSPITAL | Age: 66
End: 2021-04-21
Attending: ORTHOPAEDIC SURGERY
Payer: COMMERCIAL

## 2021-04-21 PROCEDURE — 97110 THERAPEUTIC EXERCISES: CPT

## 2021-04-21 PROCEDURE — 97140 MANUAL THERAPY 1/> REGIONS: CPT

## 2021-04-21 NOTE — PROGRESS NOTES
Dx: Left shoulder adhesive capsulitis. Insurance (Authorized # of Visits):  15           Authorizing Physician: Dr. Jaqui López MD visit: none scheduled/tbd. ..   Fall Risk: standard         Precautions: n/a              Discharge Summary  Pt has at improved ability to sleep without waking due to shoulder pain. Progress. · Pt will improve shoulder flexion AROM to >120 degrees to be able to reach into overhead cabinets without pain or restriction. Partially met.    · Pt will improve shoulder abducti right side bending. X. Supine cervical PROM by PT. .. Supine cervical PROM by PT. Supine cervical PROM by PT. - - Supine cervical PROM by PT. Standing cervical SB to R stretch; added to HEP. Supine cervical PROM by PT.    Supine left scalene stretchin and III L GH posterior and caudal mobs by PT. Grade II and III L GH posterior and caudal mobs by PT. Grade II and III L GH posterior and caudal mobs by PT. Grade II and III L GH posterior and caudal mobs by PT.     AA left shoulder IR/ER with PT guidance direction sub-maximally. X 10 reps each direction. X 10 reps each. TNE was provided throughout today's session. TNE continued to be provided during today's session.  - X. TNE continues to be done during each session. TNE was discussed today.    TNE w

## 2021-07-02 DIAGNOSIS — I10 ESSENTIAL HYPERTENSION: ICD-10-CM

## 2021-07-02 RX ORDER — DILTIAZEM HYDROCHLORIDE 120 MG/1
120 CAPSULE, COATED, EXTENDED RELEASE ORAL DAILY
Qty: 90 CAPSULE | Refills: 1 | Status: SHIPPED | OUTPATIENT
Start: 2021-07-02 | End: 2022-01-04

## 2021-07-30 DIAGNOSIS — I10 ESSENTIAL HYPERTENSION: ICD-10-CM

## 2021-08-02 RX ORDER — LISINOPRIL AND HYDROCHLOROTHIAZIDE 25; 20 MG/1; MG/1
1 TABLET ORAL DAILY
Qty: 90 TABLET | Refills: 1 | Status: SHIPPED | OUTPATIENT
Start: 2021-08-02 | End: 2022-01-25

## 2021-12-11 ENCOUNTER — TELEPHONE (OUTPATIENT)
Dept: INTERNAL MEDICINE CLINIC | Facility: CLINIC | Age: 66
End: 2021-12-11

## 2021-12-11 DIAGNOSIS — A60.00 HERPES SIMPLEX INFECTION OF GENITOURINARY SYSTEM: Primary | ICD-10-CM

## 2021-12-11 RX ORDER — VALACYCLOVIR HYDROCHLORIDE 1 G/1
TABLET, FILM COATED ORAL
Qty: 90 TABLET | Refills: 0 | Status: SHIPPED | OUTPATIENT
Start: 2021-12-11

## 2022-01-04 DIAGNOSIS — I10 ESSENTIAL HYPERTENSION: ICD-10-CM

## 2022-01-04 RX ORDER — DILTIAZEM HYDROCHLORIDE 120 MG/1
120 CAPSULE, COATED, EXTENDED RELEASE ORAL DAILY
Qty: 90 CAPSULE | Refills: 0 | Status: SHIPPED | OUTPATIENT
Start: 2022-01-04

## 2022-01-04 NOTE — TELEPHONE ENCOUNTER
Did the patient contact the pharmacy directly?:  No - new pharmacy    Is patient out of meds or supply very low?:  One week left    Medication Requested:  dilTIAZem (CARTIA XT) Oral Capsule SR 24 Hr    Dose:  120 MG    Is patient requesting a 30 or 90 day

## 2022-01-04 NOTE — TELEPHONE ENCOUNTER
Last OV relevant to medication: 1/28/21  Last refill date: 7/2/21     #/refills: 90/1  When pt was asked to return for OV: none note  Upcoming appt/reason: 2/26/22  Was pt informed of any over due labs: none  Failed protocol for no recent Labs    Lab Resul

## 2022-01-24 DIAGNOSIS — I10 ESSENTIAL HYPERTENSION: ICD-10-CM

## 2022-01-24 NOTE — TELEPHONE ENCOUNTER
Did the patient contact the pharmacy directly?:  No - pharmacy change    Is patient out of meds or supply very low?:  Low (5 left)    Medication Requested:  Lisinopril-hydroCHLOROthiazide Oral Tab    Dose:  20-25 MG    Is patient requesting a 30 or 90 day

## 2022-01-25 RX ORDER — LISINOPRIL AND HYDROCHLOROTHIAZIDE 25; 20 MG/1; MG/1
1 TABLET ORAL DAILY
Qty: 90 TABLET | Refills: 0 | Status: SHIPPED | OUTPATIENT
Start: 2022-01-25

## 2022-01-25 NOTE — TELEPHONE ENCOUNTER
Last OV relevant to medication: 12/3/2020  Last refill date: 8/2/21     #/refills: 90/1  When pt was asked to return for OV: 1 year  Upcoming appt/reason: 2/26/22, PE  Was pt informed of any over due labs: none    Lab Results   Component Value Date    GLU

## 2022-02-26 ENCOUNTER — OFFICE VISIT (OUTPATIENT)
Dept: INTERNAL MEDICINE CLINIC | Facility: CLINIC | Age: 67
End: 2022-02-26
Payer: COMMERCIAL

## 2022-02-26 VITALS
HEART RATE: 90 BPM | DIASTOLIC BLOOD PRESSURE: 78 MMHG | SYSTOLIC BLOOD PRESSURE: 132 MMHG | HEIGHT: 67.5 IN | TEMPERATURE: 100 F | RESPIRATION RATE: 16 BRPM | WEIGHT: 191 LBS | OXYGEN SATURATION: 96 % | BODY MASS INDEX: 29.63 KG/M2

## 2022-02-26 DIAGNOSIS — Z12.11 COLON CANCER SCREENING: ICD-10-CM

## 2022-02-26 DIAGNOSIS — Z12.31 BREAST CANCER SCREENING BY MAMMOGRAM: ICD-10-CM

## 2022-02-26 DIAGNOSIS — I10 ESSENTIAL HYPERTENSION: ICD-10-CM

## 2022-02-26 DIAGNOSIS — Z23 NEED FOR VACCINATION: ICD-10-CM

## 2022-02-26 DIAGNOSIS — Z00.00 ROUTINE GENERAL MEDICAL EXAMINATION AT A HEALTH CARE FACILITY: Primary | ICD-10-CM

## 2022-02-26 DIAGNOSIS — A60.00 HERPES SIMPLEX INFECTION OF GENITOURINARY SYSTEM: ICD-10-CM

## 2022-02-26 PROBLEM — H26.493 PCO (POSTERIOR CAPSULAR OPACIFICATION), BILATERAL: Status: ACTIVE | Noted: 2021-07-20

## 2022-02-26 PROCEDURE — 3075F SYST BP GE 130 - 139MM HG: CPT | Performed by: INTERNAL MEDICINE

## 2022-02-26 PROCEDURE — 99213 OFFICE O/P EST LOW 20 MIN: CPT | Performed by: INTERNAL MEDICINE

## 2022-02-26 PROCEDURE — 3078F DIAST BP <80 MM HG: CPT | Performed by: INTERNAL MEDICINE

## 2022-02-26 PROCEDURE — 90750 HZV VACC RECOMBINANT IM: CPT | Performed by: INTERNAL MEDICINE

## 2022-02-26 PROCEDURE — 3008F BODY MASS INDEX DOCD: CPT | Performed by: INTERNAL MEDICINE

## 2022-02-26 PROCEDURE — 90471 IMMUNIZATION ADMIN: CPT | Performed by: INTERNAL MEDICINE

## 2022-02-26 PROCEDURE — 99396 PREV VISIT EST AGE 40-64: CPT | Performed by: INTERNAL MEDICINE

## 2022-02-26 RX ORDER — DILTIAZEM HYDROCHLORIDE 120 MG/1
120 CAPSULE, COATED, EXTENDED RELEASE ORAL DAILY
Qty: 90 CAPSULE | Refills: 3 | Status: SHIPPED | OUTPATIENT
Start: 2022-02-26

## 2022-02-26 RX ORDER — VALACYCLOVIR HYDROCHLORIDE 1 G/1
1000 TABLET, FILM COATED ORAL DAILY
Qty: 90 TABLET | Refills: 3 | Status: SHIPPED | OUTPATIENT
Start: 2022-02-26

## 2022-02-26 RX ORDER — LISINOPRIL AND HYDROCHLOROTHIAZIDE 25; 20 MG/1; MG/1
1 TABLET ORAL DAILY
Qty: 90 TABLET | Refills: 3 | Status: SHIPPED | OUTPATIENT
Start: 2022-02-26

## 2022-04-08 ENCOUNTER — TELEPHONE (OUTPATIENT)
Dept: INTERNAL MEDICINE CLINIC | Facility: CLINIC | Age: 67
End: 2022-04-08

## 2022-04-08 ENCOUNTER — OFFICE VISIT (OUTPATIENT)
Dept: INTERNAL MEDICINE CLINIC | Facility: CLINIC | Age: 67
End: 2022-04-08
Payer: COMMERCIAL

## 2022-04-08 VITALS
RESPIRATION RATE: 16 BRPM | HEART RATE: 84 BPM | BODY MASS INDEX: 30 KG/M2 | WEIGHT: 195 LBS | SYSTOLIC BLOOD PRESSURE: 130 MMHG | DIASTOLIC BLOOD PRESSURE: 82 MMHG | TEMPERATURE: 97 F

## 2022-04-08 DIAGNOSIS — R39.9 UTI SYMPTOMS: ICD-10-CM

## 2022-04-08 DIAGNOSIS — N89.8 VAGINAL ITCHING: ICD-10-CM

## 2022-04-08 LAB
BILIRUB UR QL STRIP.AUTO: NEGATIVE
CLARITY UR REFRACT.AUTO: CLEAR
GLUCOSE UR STRIP.AUTO-MCNC: NEGATIVE MG/DL
KETONES UR STRIP.AUTO-MCNC: NEGATIVE MG/DL
NITRITE UR QL STRIP.AUTO: NEGATIVE
PH UR STRIP.AUTO: 6 [PH] (ref 5–8)
PROT UR STRIP.AUTO-MCNC: NEGATIVE MG/DL
RBC UR QL AUTO: NEGATIVE
SP GR UR STRIP.AUTO: 1.01 (ref 1–1.03)
UROBILINOGEN UR STRIP.AUTO-MCNC: <2 MG/DL

## 2022-04-08 PROCEDURE — 87086 URINE CULTURE/COLONY COUNT: CPT | Performed by: NURSE PRACTITIONER

## 2022-04-08 PROCEDURE — 99214 OFFICE O/P EST MOD 30 MIN: CPT | Performed by: NURSE PRACTITIONER

## 2022-04-08 PROCEDURE — 87660 TRICHOMONAS VAGIN DIR PROBE: CPT | Performed by: NURSE PRACTITIONER

## 2022-04-08 PROCEDURE — 87480 CANDIDA DNA DIR PROBE: CPT | Performed by: NURSE PRACTITIONER

## 2022-04-08 PROCEDURE — 3079F DIAST BP 80-89 MM HG: CPT | Performed by: NURSE PRACTITIONER

## 2022-04-08 PROCEDURE — 81001 URINALYSIS AUTO W/SCOPE: CPT | Performed by: NURSE PRACTITIONER

## 2022-04-08 PROCEDURE — 3075F SYST BP GE 130 - 139MM HG: CPT | Performed by: NURSE PRACTITIONER

## 2022-04-08 PROCEDURE — 87510 GARDNER VAG DNA DIR PROBE: CPT | Performed by: NURSE PRACTITIONER

## 2022-04-08 NOTE — TELEPHONE ENCOUNTER
Eric transferred call to me   Triaged pt for uti symptoms   Pt reported she had difficulty urinating last night, had the urge to go but very little fluid came out   Pt does not have burning with urination, back/abdominal/pelvic pain, no fever, chills, altered loc   Pt says she typically drinks a lot of water everyday but her intake has decreased the last couple of days   Pt stated she started drinking a lot of water last night and continued to do so into today   Pt reported she typically has 2 cups of coffee in the morning, denies drinking soda  Advised put to continue drinking water after her coffee to hydrate, pt verbalized understanding   Pt is still concerned she could have a uti because she's never experienced urgency without actually being able to go   Pt is still voiding just with decreased output, denies urine discoloration or foul odor but wants to be seen by a provider   Pt did not want to have symptoms and not be able to get in touch with anyone over the weekend   Scheduled w/Lynda 1pm

## 2022-04-08 NOTE — PATIENT INSTRUCTIONS
Increase your water intake to 64-80 oz daily. We will call you with the results of the urine test and vaginal swab. If anything is abnormal, we will send the medications if needed. Go to ER for severe abdominal pain, blood in urine, or persistent fevers >103. Return to clinic as needed or when routine care is due.

## 2022-04-11 NOTE — PROGRESS NOTES
Spoke to patient, aware of results and recommendations. Patient voice understandings.      Patient denies any symptoms at this time

## 2022-06-07 ENCOUNTER — NURSE ONLY (OUTPATIENT)
Dept: INTERNAL MEDICINE CLINIC | Facility: CLINIC | Age: 67
End: 2022-06-07
Payer: COMMERCIAL

## 2022-06-07 DIAGNOSIS — Z23 NEED FOR VACCINATION: Primary | ICD-10-CM

## 2022-06-07 PROCEDURE — 90471 IMMUNIZATION ADMIN: CPT | Performed by: INTERNAL MEDICINE

## 2022-06-07 PROCEDURE — 90750 HZV VACC RECOMBINANT IM: CPT | Performed by: INTERNAL MEDICINE

## 2022-06-21 ENCOUNTER — TELEPHONE (OUTPATIENT)
Dept: INTERNAL MEDICINE CLINIC | Facility: CLINIC | Age: 67
End: 2022-06-21

## 2022-09-22 ENCOUNTER — OFFICE VISIT (OUTPATIENT)
Dept: INTERNAL MEDICINE CLINIC | Facility: CLINIC | Age: 67
End: 2022-09-22

## 2022-09-22 VITALS
DIASTOLIC BLOOD PRESSURE: 74 MMHG | SYSTOLIC BLOOD PRESSURE: 122 MMHG | WEIGHT: 191.38 LBS | BODY MASS INDEX: 29.69 KG/M2 | TEMPERATURE: 97 F | HEART RATE: 78 BPM | RESPIRATION RATE: 14 BRPM | HEIGHT: 67.5 IN | OXYGEN SATURATION: 98 %

## 2022-09-22 DIAGNOSIS — M54.50 ACUTE BILATERAL LOW BACK PAIN WITHOUT SCIATICA: Primary | ICD-10-CM

## 2022-09-22 PROCEDURE — 3078F DIAST BP <80 MM HG: CPT | Performed by: NURSE PRACTITIONER

## 2022-09-22 PROCEDURE — 99214 OFFICE O/P EST MOD 30 MIN: CPT | Performed by: NURSE PRACTITIONER

## 2022-09-22 PROCEDURE — 3008F BODY MASS INDEX DOCD: CPT | Performed by: NURSE PRACTITIONER

## 2022-09-22 PROCEDURE — 3074F SYST BP LT 130 MM HG: CPT | Performed by: NURSE PRACTITIONER

## 2022-09-22 RX ORDER — PREDNISONE 20 MG/1
40 TABLET ORAL DAILY
Qty: 10 TABLET | Refills: 0 | Status: CANCELLED | OUTPATIENT
Start: 2022-09-22 | End: 2022-09-27

## 2022-09-22 RX ORDER — CYCLOBENZAPRINE HCL 5 MG
5 TABLET ORAL 3 TIMES DAILY PRN
Qty: 30 TABLET | Refills: 0 | Status: SHIPPED | OUTPATIENT
Start: 2022-09-22

## 2022-09-22 RX ORDER — IBUPROFEN 200 MG
400 TABLET ORAL EVERY 6 HOURS PRN
COMMUNITY

## 2022-09-22 RX ORDER — ACETAMINOPHEN 325 MG/1
650 TABLET ORAL EVERY 6 HOURS PRN
COMMUNITY

## 2022-09-22 NOTE — PATIENT INSTRUCTIONS
Continue to alternate between Tylenol and ibuprofen as needed for the pain    You may take the muscle relaxant up to 3 times daily as needed. Do not drive or operate any heavy machinery while taking this. Start physical therapy. Alternate between heat and ice therapy. Start stretching exercises. Avoid any strenuous activity or heavy lifting. Return to clinic as needed if pain persist/worsen.

## 2022-10-04 ENCOUNTER — TELEPHONE (OUTPATIENT)
Dept: PHYSICAL THERAPY | Facility: HOSPITAL | Age: 67
End: 2022-10-04

## 2022-10-05 ENCOUNTER — OFFICE VISIT (OUTPATIENT)
Dept: PHYSICAL THERAPY | Facility: HOSPITAL | Age: 67
End: 2022-10-05
Attending: NURSE PRACTITIONER
Payer: COMMERCIAL

## 2022-10-05 DIAGNOSIS — M54.50 ACUTE BILATERAL LOW BACK PAIN WITHOUT SCIATICA: ICD-10-CM

## 2022-10-05 PROCEDURE — 97162 PT EVAL MOD COMPLEX 30 MIN: CPT

## 2022-10-05 PROCEDURE — 97110 THERAPEUTIC EXERCISES: CPT

## 2022-10-06 ENCOUNTER — TELEPHONE (OUTPATIENT)
Dept: INTERNAL MEDICINE CLINIC | Facility: CLINIC | Age: 67
End: 2022-10-06

## 2022-10-06 NOTE — TELEPHONE ENCOUNTER
Vaccine/Injection requesting: high dose flu shot  Is the patient due for an appointment?: no  Has pt checked insurance coverage?: yes    Patient said her insurance covers the flu shot if she gets it in the doctor's office.   If she goes to a pharmacy, it will cost $75

## 2022-10-07 NOTE — TELEPHONE ENCOUNTER
Future Appointments   Date Time Provider Stacy Jean   10/12/2022  1:40 PM [facility] NURSE [facility] [place]

## 2022-10-12 ENCOUNTER — OFFICE VISIT (OUTPATIENT)
Dept: PHYSICAL THERAPY | Facility: HOSPITAL | Age: 67
End: 2022-10-12
Attending: NURSE PRACTITIONER
Payer: COMMERCIAL

## 2022-10-12 ENCOUNTER — NURSE ONLY (OUTPATIENT)
Dept: INTERNAL MEDICINE CLINIC | Facility: CLINIC | Age: 67
End: 2022-10-12
Payer: COMMERCIAL

## 2022-10-12 PROCEDURE — 90471 IMMUNIZATION ADMIN: CPT | Performed by: INTERNAL MEDICINE

## 2022-10-12 PROCEDURE — 97110 THERAPEUTIC EXERCISES: CPT

## 2022-10-12 PROCEDURE — 90662 IIV NO PRSV INCREASED AG IM: CPT | Performed by: INTERNAL MEDICINE

## 2022-10-12 NOTE — PROGRESS NOTES
Diagnosis:   Acute bilateral low back pain without sciatica (M54.50)    Referring Provider: Pankaj Cervantes  Date of Evaluation:    10/5/22. Precautions:  None Next MD visit:   none scheduled  Date of Surgery: n/a   Insurance Primary/Secondary: [address] [place] [address] / N/A     # Auth Visits: 8. Subjective: Centralized low back aching pain is a 0-1/10 currently, and as high as a 3/10 after prolonged sitting for work. Has not taken Ibuprofen for the past few days. HEP is going ok. Pain: 0-3/10    Objective: see treatment flow sheet. Assessment: tolerated exercises well today. Goals:  (to be met in 8 visits)   TL ROM improved from baseline allowing improved bending forward ADLs. Pt will go up/down a flight of stairs reciprocally without pain or difficulty. Pt will report improved tolerance to heavier lifting. Pt will be independent with an upgraded HEP. Plan: education, manual therapy prn, LE/core strengthening. .. Date: 10/12/2022  TX#: 2/8 Date:                 TX#: 3/ Date:                 TX#: 4/ Date:                 TX#: 5/ Date: Tx#: 6/   TM 2 mph for 4 minutes. DKTC with heels on SB 2 x 10. LTR with calves on SB 2 x 10 l/r. .. Piriformis stretch by PT with SB use for 30 seconds x 2. Bridging short range with calves on SB 2-3 second holds x 10 reps. .       Supine clam shells with green band around knees 2 x 10. PPT while hook lying with ball squeezing for 5 second holds x 15 reps. .. Seated sciatic tensioners x 15 each leg. Standing B SR with green band x 15 reps. .       Standing calf stretch for 30 seconds each leg. Sit to/from standing x 5 reps. ... TNE was given during today's session.             Charges: ex3       Total Timed Treatment: 40 min  Total Treatment Time: 40 min

## 2022-10-14 ENCOUNTER — OFFICE VISIT (OUTPATIENT)
Dept: PHYSICAL THERAPY | Facility: HOSPITAL | Age: 67
End: 2022-10-14
Attending: NURSE PRACTITIONER
Payer: COMMERCIAL

## 2022-10-14 PROCEDURE — 97110 THERAPEUTIC EXERCISES: CPT

## 2022-10-14 NOTE — PROGRESS NOTES
Diagnosis:   Acute bilateral low back pain without sciatica (M54.50)    Referring Provider: Art Oviedo  Date of Evaluation:    10/5/22. Precautions:  None Next MD visit:   none scheduled  Date of Surgery: n/a   Insurance Primary/Secondary: [address] [place] [address] / N/A     # Auth Visits: 8. Subjective: Centralized low back aching pain is a 0/10 currently, and as high as a 2-3/10 today after prolonged sitting for work. Notes some stiffness in her back this afternoon. Notes feeling tired for the past few days after getting a high-dosage flu vaccination. HEP is going ok. Pain: 0-3/10    Objective: see treatment flow sheet. Assessment: tolerated exercises well today. Goals:  (to be met in 8 visits)   TL ROM improved from baseline allowing improved bending forward ADLs. Pt will go up/down a flight of stairs reciprocally without pain or difficulty. Pt will report improved tolerance to heavier lifting. Pt will be independent with an upgraded HEP. Plan: education, manual therapy prn, LE/core strengthening. .. Date: 10/12/2022  TX#: 2/8 Date: 10/14/22            TX#: 3/8 Date:                 TX#: 4/ Date:                 TX#: 5/ Date: Tx#: 6/   TM 2 mph for 4 minutes. TM for 5 minute warm-up. DKTC with heels on SB 2 x 10. X 10 reps. [place] Row LTR with calves on SB 2 x 10 l/r. .. X 20 l/r. Al Row Piriformis stretch by PT with SB use for 30 seconds x 2. X.      Bridging short range with calves on SB 2-3 second holds x 10 reps. .  with PPT x 10 reps. Supine clam shells with green band around knees 2 x 10. X 20 reps. .. PPT while hook lying with ball squeezing for 5 second holds x 15 reps. .. X 15 reps. .. Seated sciatic tensioners x 15 each leg. X 15 reps each leg. Standing B SR with green band x 15 reps. . With peach band x 15 reps. .. Standing calf stretch for 30 seconds each leg. Wooden balance board a/p x 20. Sit to/from standing x 5 reps. [place] Row Al Row Al Row  4\" [employer] x 12 each leg. TNE was given during today's session. Home computer/chair ergonomics were discussed.            Charges: ex3       Total Timed Treatment: 40 min  Total Treatment Time: 40 min

## 2022-10-17 ENCOUNTER — APPOINTMENT (OUTPATIENT)
Dept: PHYSICAL THERAPY | Facility: HOSPITAL | Age: 67
End: 2022-10-17
Attending: NURSE PRACTITIONER
Payer: COMMERCIAL

## 2022-10-19 ENCOUNTER — OFFICE VISIT (OUTPATIENT)
Dept: PHYSICAL THERAPY | Facility: HOSPITAL | Age: 67
End: 2022-10-19
Attending: NURSE PRACTITIONER
Payer: COMMERCIAL

## 2022-10-19 PROCEDURE — 97110 THERAPEUTIC EXERCISES: CPT

## 2022-10-19 NOTE — PROGRESS NOTES
Diagnosis:   Acute bilateral low back pain without sciatica (M54.50)    Referring Provider: Rolando Serrato  Date of Evaluation:    10/5/22. Precautions:  None Next MD visit:   none scheduled  Date of Surgery: n/a   Insurance Primary/Secondary: [address] [place] [address] / N/A     # Auth Visits: 8. Subjective: Centralized low back aching pain is a 1-2/10 this aftenoon. Notes less stiffness in her back this afternoon. Is using a pillow in her chair at work with noted benefit. HEP is going ok. Pain: 1-2/10    Objective: see treatment flow sheet. Assessment: tolerated exercises well today. Is marginally less symptomatic overall. Goals:  (to be met in 8 visits)   TL ROM improved from baseline allowing improved bending forward ADLs. Pt will go up/down a flight of stairs reciprocally without pain or difficulty. Pt will report improved tolerance to heavier lifting. Pt will be independent with an upgraded HEP. Plan: education, manual therapy prn, LE/core strengthening. .. Date: 10/12/2022  TX#: 2/8 Date: 10/14/22            TX#: 3/8 Date:   10/19/22              TX#: 4/8 Date:                 TX#: 5/ Date: Tx#: 6/   TM 2 mph for 4 minutes. TM for 5 minute warm-up. TM at 2.5 mph for 5 minutes. DKTC with heels on SB 2 x 10. X 10 reps. . DKTC with heels on SB 2 x 10. LTR with calves on SB 2 x 10 l/r. .. X 20 l/r. . X 20 l/r. .. Piriformis stretch by PT with SB use for 30 seconds x 2. X. For 1 minute each leg. Bridging short range with calves on SB 2-3 second holds x 10 reps. .  with PPT x 10 reps. X 10 reps. Supine clam shells with green band around knees 2 x 10. X 20 reps. .. X 20 reps. .. PPT while hook lying with ball squeezing for 5 second holds x 15 reps. .. X 15 reps. .. X 15 reps. .. Seated sciatic tensioners x 15 each leg. X 15 reps each leg. X 10 each leg. Standing B SR with green band x 15 reps. . With peach band x 15 reps. ..  With yellow band x 20 reps... Standing calf stretch for 30 seconds each leg. Wooden balance board a/p x 20. Wooden balance board a/p x 20. Sit to/from standing x 5 reps. [place] Al 4\" [employer] x 12 each leg. 4\" [employer] x 12 each leg. [employer] marching x 15 l/r. [place] Al TNE was given during today's session. Home computer/chair ergonomics were discussed. stm l-s area by PT while right side lying.           Charges: ex3       Total Timed Treatment: 42 min  Total Treatment Time: 42 min

## 2022-10-21 ENCOUNTER — TELEPHONE (OUTPATIENT)
Dept: PHYSICAL THERAPY | Facility: HOSPITAL | Age: 67
End: 2022-10-21

## 2022-10-24 ENCOUNTER — APPOINTMENT (OUTPATIENT)
Dept: PHYSICAL THERAPY | Facility: HOSPITAL | Age: 67
End: 2022-10-24
Attending: NURSE PRACTITIONER
Payer: COMMERCIAL

## 2022-11-08 ENCOUNTER — TELEPHONE (OUTPATIENT)
Dept: PHYSICAL THERAPY | Facility: HOSPITAL | Age: 67
End: 2022-11-08

## 2022-11-08 ENCOUNTER — APPOINTMENT (OUTPATIENT)
Dept: PHYSICAL THERAPY | Facility: HOSPITAL | Age: 67
End: 2022-11-08
Payer: COMMERCIAL

## 2022-11-10 ENCOUNTER — APPOINTMENT (OUTPATIENT)
Dept: PHYSICAL THERAPY | Facility: HOSPITAL | Age: 67
End: 2022-11-10
Payer: COMMERCIAL

## 2022-11-15 ENCOUNTER — APPOINTMENT (OUTPATIENT)
Dept: PHYSICAL THERAPY | Facility: HOSPITAL | Age: 67
End: 2022-11-15
Payer: COMMERCIAL

## 2022-11-17 ENCOUNTER — APPOINTMENT (OUTPATIENT)
Dept: PHYSICAL THERAPY | Facility: HOSPITAL | Age: 67
End: 2022-11-17
Payer: COMMERCIAL

## 2022-12-12 ENCOUNTER — OFFICE VISIT (OUTPATIENT)
Dept: INTERNAL MEDICINE CLINIC | Facility: CLINIC | Age: 67
End: 2022-12-12
Payer: COMMERCIAL

## 2022-12-12 VITALS
DIASTOLIC BLOOD PRESSURE: 74 MMHG | TEMPERATURE: 98 F | BODY MASS INDEX: 29.7 KG/M2 | HEART RATE: 84 BPM | SYSTOLIC BLOOD PRESSURE: 118 MMHG | OXYGEN SATURATION: 98 % | HEIGHT: 67.5 IN | WEIGHT: 191.5 LBS | RESPIRATION RATE: 14 BRPM

## 2022-12-12 DIAGNOSIS — J06.9 VIRAL URI: ICD-10-CM

## 2022-12-12 DIAGNOSIS — R05.1 ACUTE COUGH: Primary | ICD-10-CM

## 2022-12-12 PROCEDURE — 3074F SYST BP LT 130 MM HG: CPT | Performed by: NURSE PRACTITIONER

## 2022-12-12 PROCEDURE — 3078F DIAST BP <80 MM HG: CPT | Performed by: NURSE PRACTITIONER

## 2022-12-12 PROCEDURE — 99214 OFFICE O/P EST MOD 30 MIN: CPT | Performed by: NURSE PRACTITIONER

## 2022-12-12 PROCEDURE — 3008F BODY MASS INDEX DOCD: CPT | Performed by: NURSE PRACTITIONER

## 2022-12-12 RX ORDER — PREDNISONE 20 MG/1
20 TABLET ORAL DAILY
Qty: 5 TABLET | Refills: 0 | Status: SHIPPED | OUTPATIENT
Start: 2022-12-12

## 2022-12-12 NOTE — PATIENT INSTRUCTIONS
Get your chest x-ray done. Start the oral prednisone today. Take it with food. Do not lay down for 1 hour after taking it. Monitor for side effects including stomach pain, acid reflux, trouble sleeping, and anxiety. You can use Robitussin DM or Mucinex DM as directed on the bottle for cough and chest congestion. Do warm, salt water gargles 2-3 times daily as needed for sore throat. Use Cepacol for sore throat and dry cough as needed. Use Tylenol as needed for pain or fever. Stay hydrated. Go to ER or call 911 if worsening symptoms such as persistent fever >103, difficulty breathing, or chest pain. Return to clinic in one week as needed or when routine care is due.     Infection prevention:  - Proper hand hygiene is very important          - Wear a mask in public  - Avoid touching your mouth, nose, eyes, and face  - Practice social distancing and staying 6 ft away from other individuals  - Cough into your arm or a tissue  - Avoid contact with others if you have symptoms of an upper or lower respiratory infection  - Avoid contact with others who are ill  - Avoid direct contact with your pets if you are ill  - Disinfect surfaces with appropriate materials  - If your symptoms become severe (shortness of breath with rest or activity, fever, chest congestion, productive cough), go to the ER  - Recommend self-isolation at home if you are sick, wearing a mask if in room with other family members

## 2022-12-13 ENCOUNTER — HOSPITAL ENCOUNTER (OUTPATIENT)
Dept: GENERAL RADIOLOGY | Age: 67
Discharge: HOME | End: 2022-12-13
Attending: NURSE PRACTITIONER
Payer: COMMERCIAL

## 2022-12-13 DIAGNOSIS — R05.1 ACUTE COUGH: ICD-10-CM

## 2022-12-13 PROCEDURE — 71046 X-RAY EXAM CHEST 2 VIEWS: CPT | Performed by: NURSE PRACTITIONER

## 2023-04-26 DIAGNOSIS — A60.00 HERPES SIMPLEX INFECTION OF GENITOURINARY SYSTEM: ICD-10-CM

## 2023-04-26 RX ORDER — VALACYCLOVIR HYDROCHLORIDE 1 G/1
1000 TABLET, FILM COATED ORAL DAILY
Qty: 90 TABLET | Refills: 0 | Status: SHIPPED | OUTPATIENT
Start: 2023-04-26

## 2023-04-26 NOTE — TELEPHONE ENCOUNTER
Herpes Agent Protocol Passed 04/26/2023 03:50 PM    Appointment in the past 12 or next 3 months     Future Appointments   Date Time Provider Stacy Jean   5/16/2023  7:20 AM Hawa Alegre MD [facility] [place]   Refilled per protocol.

## 2023-05-08 DIAGNOSIS — I10 ESSENTIAL HYPERTENSION: ICD-10-CM

## 2023-05-08 RX ORDER — DILTIAZEM HYDROCHLORIDE 120 MG/1
120 CAPSULE, COATED, EXTENDED RELEASE ORAL DAILY
Qty: 30 CAPSULE | Refills: 0 | Status: SHIPPED | OUTPATIENT
Start: 2023-05-08

## 2023-05-11 ENCOUNTER — OFFICE VISIT (OUTPATIENT)
Dept: INTERNAL MEDICINE CLINIC | Facility: CLINIC | Age: 68
End: 2023-05-11
Payer: COMMERCIAL

## 2023-05-11 VITALS
OXYGEN SATURATION: 99 % | BODY MASS INDEX: 29.53 KG/M2 | SYSTOLIC BLOOD PRESSURE: 138 MMHG | DIASTOLIC BLOOD PRESSURE: 78 MMHG | TEMPERATURE: 99 F | HEART RATE: 85 BPM | RESPIRATION RATE: 18 BRPM | HEIGHT: 67.5 IN | WEIGHT: 190.38 LBS

## 2023-05-11 DIAGNOSIS — N81.10 VAGINAL PROLAPSE: Primary | ICD-10-CM

## 2023-05-11 DIAGNOSIS — I49.9 IRREGULAR HEART RHYTHM: ICD-10-CM

## 2023-05-11 PROBLEM — K21.9 GASTRO-ESOPHAGEAL REFLUX DISEASE WITHOUT ESOPHAGITIS: Status: ACTIVE | Noted: 2023-05-11

## 2023-05-11 LAB
ATRIAL RATE: 91 BPM
Q-T INTERVAL: 370 MS
QRS DURATION: 84 MS
QTC CALCULATION (BEZET): 455 MS
R AXIS: 13 DEGREES
T AXIS: 47 DEGREES
VENTRICULAR RATE: 91 BPM

## 2023-05-11 PROCEDURE — 3078F DIAST BP <80 MM HG: CPT | Performed by: NURSE PRACTITIONER

## 2023-05-11 PROCEDURE — 3075F SYST BP GE 130 - 139MM HG: CPT | Performed by: NURSE PRACTITIONER

## 2023-05-11 PROCEDURE — 3008F BODY MASS INDEX DOCD: CPT | Performed by: NURSE PRACTITIONER

## 2023-05-11 PROCEDURE — 99214 OFFICE O/P EST MOD 30 MIN: CPT | Performed by: NURSE PRACTITIONER

## 2023-05-11 PROCEDURE — 93000 ELECTROCARDIOGRAM COMPLETE: CPT | Performed by: NURSE PRACTITIONER

## 2023-05-11 NOTE — PATIENT INSTRUCTIONS
Start physical therapy for your pelvic health    Make an apt with urogyne and see them if your symptoms worsen or if therapy is not helpful    Follow up as needed or when routine care is due

## 2023-05-12 ENCOUNTER — TELEPHONE (OUTPATIENT)
Dept: INTERNAL MEDICINE CLINIC | Facility: CLINIC | Age: 68
End: 2023-05-12

## 2023-05-12 ENCOUNTER — TELEPHONE (OUTPATIENT)
Dept: PHYSICAL THERAPY | Facility: HOSPITAL | Age: 68
End: 2023-05-12

## 2023-05-16 ENCOUNTER — OFFICE VISIT (OUTPATIENT)
Dept: INTERNAL MEDICINE CLINIC | Facility: CLINIC | Age: 68
End: 2023-05-16
Payer: COMMERCIAL

## 2023-05-16 VITALS
DIASTOLIC BLOOD PRESSURE: 80 MMHG | WEIGHT: 186.38 LBS | HEART RATE: 84 BPM | RESPIRATION RATE: 12 BRPM | BODY MASS INDEX: 28.91 KG/M2 | TEMPERATURE: 98 F | SYSTOLIC BLOOD PRESSURE: 136 MMHG | HEIGHT: 67.5 IN

## 2023-05-16 DIAGNOSIS — Z78.0 POSTMENOPAUSAL: ICD-10-CM

## 2023-05-16 DIAGNOSIS — Z12.11 SCREENING FOR COLON CANCER: ICD-10-CM

## 2023-05-16 DIAGNOSIS — Z12.31 ENCOUNTER FOR SCREENING MAMMOGRAM FOR MALIGNANT NEOPLASM OF BREAST: ICD-10-CM

## 2023-05-16 DIAGNOSIS — Z00.00 PHYSICAL EXAM, ANNUAL: ICD-10-CM

## 2023-05-16 DIAGNOSIS — Z01.419 VISIT FOR PELVIC EXAM: Primary | ICD-10-CM

## 2023-05-16 DIAGNOSIS — I10 ESSENTIAL HYPERTENSION: ICD-10-CM

## 2023-05-16 DIAGNOSIS — A60.00 HERPES SIMPLEX INFECTION OF GENITOURINARY SYSTEM: ICD-10-CM

## 2023-05-16 PROCEDURE — 88175 CYTOPATH C/V AUTO FLUID REDO: CPT | Performed by: INTERNAL MEDICINE

## 2023-05-16 PROCEDURE — 3075F SYST BP GE 130 - 139MM HG: CPT | Performed by: INTERNAL MEDICINE

## 2023-05-16 PROCEDURE — 99397 PER PM REEVAL EST PAT 65+ YR: CPT | Performed by: INTERNAL MEDICINE

## 2023-05-16 PROCEDURE — 99214 OFFICE O/P EST MOD 30 MIN: CPT | Performed by: INTERNAL MEDICINE

## 2023-05-16 PROCEDURE — 3079F DIAST BP 80-89 MM HG: CPT | Performed by: INTERNAL MEDICINE

## 2023-05-16 PROCEDURE — 3008F BODY MASS INDEX DOCD: CPT | Performed by: INTERNAL MEDICINE

## 2023-05-16 PROCEDURE — 87624 HPV HI-RISK TYP POOLED RSLT: CPT | Performed by: INTERNAL MEDICINE

## 2023-05-16 RX ORDER — VALACYCLOVIR HYDROCHLORIDE 1 G/1
1000 TABLET, FILM COATED ORAL DAILY
Qty: 90 TABLET | Refills: 3 | Status: SHIPPED | OUTPATIENT
Start: 2023-05-16

## 2023-05-16 RX ORDER — DILTIAZEM HYDROCHLORIDE 120 MG/1
120 CAPSULE, COATED, EXTENDED RELEASE ORAL DAILY
Qty: 90 CAPSULE | Refills: 3 | Status: SHIPPED | OUTPATIENT
Start: 2023-05-16

## 2023-05-16 RX ORDER — LISINOPRIL AND HYDROCHLOROTHIAZIDE 25; 20 MG/1; MG/1
1 TABLET ORAL DAILY
Qty: 90 TABLET | Refills: 3 | Status: SHIPPED | OUTPATIENT
Start: 2023-05-16

## 2023-05-18 ENCOUNTER — TELEPHONE (OUTPATIENT)
Dept: INTERNAL MEDICINE CLINIC | Facility: CLINIC | Age: 68
End: 2023-05-18

## 2023-05-18 NOTE — TELEPHONE ENCOUNTER
Care Everywhere Records Received    Updated Care Everywhere:  yes    Date of Service:  5/17/23    From What Facility:  [facility]     Speciality:  Physical Therapy    Type of Record:   PT initial evaluation    Document Placed:  Dr Lizbeth Younger incoming folder

## 2023-05-22 LAB — HPV I/H RISK 1 DNA SPEC QL NAA+PROBE: NEGATIVE

## 2023-06-13 ENCOUNTER — TELEPHONE (OUTPATIENT)
Dept: INTERNAL MEDICINE CLINIC | Facility: CLINIC | Age: 68
End: 2023-06-13

## 2023-06-13 NOTE — TELEPHONE ENCOUNTER
She needs an OV. If she is still feeling faint now and not able to pass gas she should go to the ER for further eval. Not being able to pass gas is concerning for bowel obstruction. If she is still passing gas and if she is no longer feeling faint she can be seen in office. Thanks.

## 2023-06-13 NOTE — TELEPHONE ENCOUNTER
Patient notified. Patient verbalized understanding   Pt did not go to ER and thinking to go to [facility]. Advised to call [facility] and see if they will take her or they would send her to ER.

## 2023-06-13 NOTE — TELEPHONE ENCOUNTER
Patient asking to be squeezed in today. \"I feel really weird\". Being treated with exercises for uterine prolapse. Feeling really faint and \"whoozy\". Saw both Perla and . unable to get in with Uro-gyne until August-Dr. Karol Scott. [phone]. [facility] Uro[facility]necology. Exercises not helping. Is the prolapse worsening? ?  2 days ago started feeling gassy and felling weird. Not eating as much. Takes BP meds. Taking BP and it's ok. Having  more difficulty to urinate as a result. Difficult to pass gas. Feeling bloated. No pain. I called the number above and they said they are still awaiting forms to be finished before placing her on wait-list. I told them she needs to be seen sooner. Patient agrees to do this, in the meantime I told her I would send this you the providers.

## 2023-06-13 NOTE — TELEPHONE ENCOUNTER
Vaginal prolapse should not cause her to feel faint and woozy. It may cause difficulty with urination but unlikely to cause problems with passing gas. Agree with note re er if still faint or difficulty passing gas. Please make an ov for evaluation tomorrow.

## 2023-07-15 ENCOUNTER — HOSPITAL ENCOUNTER (OUTPATIENT)
Dept: MAMMOGRAPHY | Facility: HOSPITAL | Age: 68
Discharge: HOME | End: 2023-07-15
Attending: INTERNAL MEDICINE
Payer: COMMERCIAL

## 2023-07-15 DIAGNOSIS — Z12.31 ENCOUNTER FOR SCREENING MAMMOGRAM FOR MALIGNANT NEOPLASM OF BREAST: ICD-10-CM

## 2023-07-15 PROCEDURE — 77063 BREAST TOMOSYNTHESIS BI: CPT | Performed by: INTERNAL MEDICINE

## 2023-07-15 PROCEDURE — 77067 SCR MAMMO BI INCL CAD: CPT | Performed by: INTERNAL MEDICINE

## 2023-07-19 ENCOUNTER — LAB ENCOUNTER (OUTPATIENT)
Dept: LAB | Age: 68
End: 2023-07-19
Attending: INTERNAL MEDICINE
Payer: COMMERCIAL

## 2023-07-19 DIAGNOSIS — E87.6 HYPOKALEMIA: Primary | ICD-10-CM

## 2023-07-19 LAB
ALBUMIN SERPL-MCNC: 3.7 G/DL (ref 3.4–5)
ALBUMIN/GLOB SERPL: 1.2 {RATIO} (ref 1–2)
ALP LIVER SERPL-CCNC: 60 U/L
ALT SERPL-CCNC: 21 U/L
ANION GAP SERPL CALC-SCNC: 6 MMOL/L (ref 0–18)
AST SERPL-CCNC: 16 U/L (ref 15–37)
BASOPHILS # BLD AUTO: 0.06 X10(3) UL (ref 0–0.2)
BASOPHILS NFR BLD AUTO: 0.8 %
BILIRUB SERPL-MCNC: 0.6 MG/DL (ref 0.1–2)
BUN BLD-MCNC: 14 MG/DL (ref 7–18)
CALCIUM BLD-MCNC: 9.6 MG/DL (ref 8.5–10.1)
CHLORIDE SERPL-SCNC: 105 MMOL/L (ref 98–112)
CHOLEST SERPL-MCNC: 213 MG/DL (ref ?–200)
CO2 SERPL-SCNC: 30 MMOL/L (ref 21–32)
CREAT BLD-MCNC: 0.78 MG/DL
EOSINOPHIL # BLD AUTO: 0.28 X10(3) UL (ref 0–0.7)
EOSINOPHIL NFR BLD AUTO: 3.9 %
ERYTHROCYTE [DISTWIDTH] IN BLOOD BY AUTOMATED COUNT: 13 %
EST. AVERAGE GLUCOSE BLD GHB EST-MCNC: 120 MG/DL (ref 68–126)
FASTING PATIENT LIPID ANSWER: YES
FASTING STATUS PATIENT QL REPORTED: YES
GFR SERPLBLD BASED ON 1.73 SQ M-ARVRAT: 84 ML/MIN/1.73M2 (ref 60–?)
GLOBULIN PLAS-MCNC: 3.1 G/DL (ref 2.8–4.4)
GLUCOSE BLD-MCNC: 94 MG/DL (ref 70–99)
HBA1C MFR BLD: 5.8 % (ref ?–5.7)
HCT VFR BLD AUTO: 39.8 %
HDLC SERPL-MCNC: 66 MG/DL (ref 40–59)
HGB BLD-MCNC: 13.4 G/DL
IMM GRANULOCYTES # BLD AUTO: 0.05 X10(3) UL (ref 0–1)
IMM GRANULOCYTES NFR BLD: 0.7 %
LDLC SERPL CALC-MCNC: 133 MG/DL (ref ?–100)
LYMPHOCYTES # BLD AUTO: 2.02 X10(3) UL (ref 1–4)
LYMPHOCYTES NFR BLD AUTO: 28.5 %
MCH RBC QN AUTO: 29.8 PG (ref 26–34)
MCHC RBC AUTO-ENTMCNC: 33.7 G/DL (ref 31–37)
MCV RBC AUTO: 88.4 FL
MONOCYTES # BLD AUTO: 0.57 X10(3) UL (ref 0.1–1)
MONOCYTES NFR BLD AUTO: 8 %
NEUTROPHILS # BLD AUTO: 4.11 X10 (3) UL (ref 1.5–7.7)
NEUTROPHILS # BLD AUTO: 4.11 X10(3) UL (ref 1.5–7.7)
NEUTROPHILS NFR BLD AUTO: 58.1 %
NONHDLC SERPL-MCNC: 147 MG/DL (ref ?–130)
OSMOLALITY SERPL CALC.SUM OF ELEC: 292 MOSM/KG (ref 275–295)
PLATELET # BLD AUTO: 204 10(3)UL (ref 150–450)
POTASSIUM SERPL-SCNC: 3.4 MMOL/L (ref 3.5–5.1)
PROT SERPL-MCNC: 6.8 G/DL (ref 6.4–8.2)
RBC # BLD AUTO: 4.5 X10(6)UL
SODIUM SERPL-SCNC: 141 MMOL/L (ref 136–145)
TRIGL SERPL-MCNC: 80 MG/DL (ref 30–149)
TSI SER-ACNC: 1.05 MIU/ML (ref 0.36–3.74)
VLDLC SERPL CALC-MCNC: 14 MG/DL (ref 0–30)
WBC # BLD AUTO: 7.1 X10(3) UL (ref 4–11)

## 2023-07-19 PROCEDURE — 80061 LIPID PANEL: CPT | Performed by: INTERNAL MEDICINE

## 2023-07-19 PROCEDURE — 80050 GENERAL HEALTH PANEL: CPT | Performed by: INTERNAL MEDICINE

## 2023-07-19 PROCEDURE — 83036 HEMOGLOBIN GLYCOSYLATED A1C: CPT | Performed by: INTERNAL MEDICINE

## 2023-07-22 ENCOUNTER — HOSPITAL ENCOUNTER (OUTPATIENT)
Dept: BONE DENSITY | Age: 68
Discharge: HOME | End: 2023-07-22
Attending: INTERNAL MEDICINE
Payer: COMMERCIAL

## 2023-07-22 DIAGNOSIS — Z78.0 POSTMENOPAUSAL: ICD-10-CM

## 2023-07-22 PROCEDURE — 77080 DXA BONE DENSITY AXIAL: CPT | Performed by: INTERNAL MEDICINE

## 2023-07-24 PROBLEM — M85.80 OSTEOPENIA: Status: ACTIVE | Noted: 2023-07-24

## 2023-07-26 ENCOUNTER — TELEPHONE (OUTPATIENT)
Dept: INTERNAL MEDICINE CLINIC | Facility: CLINIC | Age: 68
End: 2023-07-26

## 2023-07-26 NOTE — TELEPHONE ENCOUNTER
TJ antonio called from [employer] with pt on the line to request a review of how her labs from 7/19/23 were coded Jeffry [place] stated they need to be coded as diagnostic so that insurance will pay for it

## 2023-07-27 ENCOUNTER — TELEPHONE (OUTPATIENT)
Dept: INTERNAL MEDICINE CLINIC | Facility: CLINIC | Age: 68
End: 2023-07-27

## 2023-07-27 NOTE — TELEPHONE ENCOUNTER
Spoke with representitive from [employer] regarding coding of DEXA scan. Advised post-menopausal code Z78.0 used ordered by . All questions answered.

## 2023-07-28 ENCOUNTER — PATIENT MESSAGE (OUTPATIENT)
Dept: INTERNAL MEDICINE CLINIC | Facility: CLINIC | Age: 68
End: 2023-07-28

## 2023-07-28 NOTE — TELEPHONE ENCOUNTER
Insurance called and is requesting a code review. Dexa needs to be coded as preventative to be covered.  Kalyn Kennedy was the rep and can be reached at [phone] ext [phone]

## 2023-07-28 NOTE — TELEPHONE ENCOUNTER
See TE from 7/28/23. Advised pt that coding can't be changed once ordered and test is completed, need insurance to contact us to change code.

## 2023-07-28 NOTE — TELEPHONE ENCOUNTER
From: Lynne Martinez  To: Jenn Muro MD  Sent: 7/28/2023 6:13 AM CDT  Subject: charge for bone density test    Dr. Carmela Fajardo,  I would like to ask your office to reconsider the coding of the bone density test that you told me to get at my annual physical exam. I am being charged $304.32 for the test as it was coded as not being preventative. I would not have gotten the test if I thought it was not preventative and that I would have to pay so much. A person from my insurance company, [employer], talked to a nurse there, Daniel Harper, yesterday and she said the test was coded correctly. Apparently it must be coded preventative in two areas, the code and the reason for the code. One of those was not coded as preventative. Please reconsider this coding. I believe this charge os $304.32 is unfair to me.   Thanks,  [employer]

## 2023-10-04 ENCOUNTER — NURSE ONLY (OUTPATIENT)
Dept: INTERNAL MEDICINE CLINIC | Facility: CLINIC | Age: 68
End: 2023-10-04
Payer: COMMERCIAL

## 2023-10-04 PROCEDURE — 90662 IIV NO PRSV INCREASED AG IM: CPT | Performed by: INTERNAL MEDICINE

## 2023-10-04 PROCEDURE — 90471 IMMUNIZATION ADMIN: CPT | Performed by: INTERNAL MEDICINE

## 2023-11-07 ENCOUNTER — TELEPHONE (OUTPATIENT)
Dept: INTERNAL MEDICINE CLINIC | Facility: CLINIC | Age: 68
End: 2023-11-07

## 2023-11-07 NOTE — TELEPHONE ENCOUNTER
Pt called about this call. Pt did not know that ins called about it too. Pt would like a call back when this gets resolved so she knows what is going on. Please update Pt when completed at [phone]

## 2023-11-07 NOTE — TELEPHONE ENCOUNTER
Rec call from jonna from [employer] (care coordination).    Calling regarding 2 claims:  July 19th labs with Dr. Pickard  July 22nd bone density    Calling regarding coding review.    Regarding labs; states needs a corrected claim form submitted to local [place] [facility] with date of service and corrected codes    States bone density was ordered diagnostically. Told jonna code Z78.0 (post-menopausal) code used for dexa from TE on 7/27. After further review looks like new codes used from encounter on 7/28/23 from patient message. Believe we will also need to fill out a corrected claim form also? Let me know if you would like me to call jonna back to let him know we did submit new codes so patient does not pay incorrect expense.    Jonna can be reached at [phone].    Unsure about this. Advised will have to reach out to supervisor regarding this. Please advise- thanks!

## 2023-11-17 ENCOUNTER — TELEPHONE (OUTPATIENT)
Dept: INTERNAL MEDICINE CLINIC | Facility: CLINIC | Age: 68
End: 2023-11-17

## 2024-07-18 DIAGNOSIS — A60.00 HERPES SIMPLEX INFECTION OF GENITOURINARY SYSTEM: ICD-10-CM

## 2024-07-19 NOTE — TELEPHONE ENCOUNTER
Last OV relevant to medication: 5/16/23  Last refill date: 5/16/23 #90/refills: 3  When pt was asked to return for OV: 5/16/24  Upcoming appt/reason:   Future Appointments   Date Time Provider Department Center   7/2/2025  9:00 AM Cristhian Greenwood MD [facility] [place]   Was pt informed of any over due labs: utd      Looks like pt saw a new PCP on 6/18/24- please confirm if pt is still following with us, if so annual physical exam needed. Thanks!

## 2024-07-30 NOTE — TELEPHONE ENCOUNTER
Left message to call back to schedule Physical (last Physical 5/16/24)   Looks like Pt may have a new PCP with Griffin Vidales DO. Left message to confirm that

## 2024-08-01 ENCOUNTER — APPOINTMENT (OUTPATIENT)
Dept: CT IMAGING | Facility: HOSPITAL | Age: 69
End: 2024-08-01
Attending: INTERNAL MEDICINE
Payer: MEDICARE

## 2024-08-01 ENCOUNTER — HOSPITAL ENCOUNTER (INPATIENT)
Facility: HOSPITAL | Age: 69
LOS: 1 days | Discharge: HOME | End: 2024-08-02
Attending: EMERGENCY MEDICINE | Admitting: INTERNAL MEDICINE
Payer: MEDICARE

## 2024-08-01 ENCOUNTER — APPOINTMENT (OUTPATIENT)
Dept: GENERAL RADIOLOGY | Facility: HOSPITAL | Age: 69
End: 2024-08-01
Attending: EMERGENCY MEDICINE
Payer: MEDICARE

## 2024-08-01 DIAGNOSIS — I49.1 PAC (PREMATURE ATRIAL CONTRACTION): ICD-10-CM

## 2024-08-01 DIAGNOSIS — I10 POORLY-CONTROLLED HYPERTENSION: ICD-10-CM

## 2024-08-01 DIAGNOSIS — I10 ESSENTIAL HYPERTENSION: ICD-10-CM

## 2024-08-01 DIAGNOSIS — I47.10 SVT (SUPRAVENTRICULAR TACHYCARDIA) (HCC): Primary | ICD-10-CM

## 2024-08-01 LAB
ALBUMIN SERPL-MCNC: 4.5 G/DL (ref 3.2–4.8)
ALBUMIN/GLOB SERPL: 1.7 {RATIO} (ref 1–2)
ALP LIVER SERPL-CCNC: 76 U/L
ALT SERPL-CCNC: 21 U/L
ANION GAP SERPL CALC-SCNC: 7 MMOL/L (ref 0–18)
APTT PPP: 31.1 SECONDS (ref 23–36)
AST SERPL-CCNC: 26 U/L (ref ?–34)
ATRIAL RATE: 135 BPM
BASOPHILS # BLD AUTO: 0.05 X10(3) UL (ref 0–0.2)
BASOPHILS NFR BLD AUTO: 0.5 %
BILIRUB SERPL-MCNC: 0.5 MG/DL (ref 0.2–1.1)
BUN BLD-MCNC: 17 MG/DL (ref 9–23)
CALCIUM BLD-MCNC: 9.8 MG/DL (ref 8.7–10.4)
CHLORIDE SERPL-SCNC: 106 MMOL/L (ref 98–112)
CO2 SERPL-SCNC: 28 MMOL/L (ref 21–32)
CREAT BLD-MCNC: 0.79 MG/DL
EGFRCR SERPLBLD CKD-EPI 2021: 82 ML/MIN/1.73M2 (ref 60–?)
EOSINOPHIL # BLD AUTO: 0.28 X10(3) UL (ref 0–0.7)
EOSINOPHIL NFR BLD AUTO: 2.7 %
ERYTHROCYTE [DISTWIDTH] IN BLOOD BY AUTOMATED COUNT: 12.7 %
GLOBULIN PLAS-MCNC: 2.7 G/DL (ref 2–3.5)
GLUCOSE BLD-MCNC: 95 MG/DL (ref 70–99)
HCT VFR BLD AUTO: 39.7 %
HGB BLD-MCNC: 14.1 G/DL
IMM GRANULOCYTES # BLD AUTO: 0.04 X10(3) UL (ref 0–1)
IMM GRANULOCYTES NFR BLD: 0.4 %
INR BLD: 0.94 (ref 0.8–1.2)
LYMPHOCYTES # BLD AUTO: 3.2 X10(3) UL (ref 1–4)
LYMPHOCYTES NFR BLD AUTO: 30.9 %
MCH RBC QN AUTO: 30.5 PG (ref 26–34)
MCHC RBC AUTO-ENTMCNC: 35.5 G/DL (ref 31–37)
MCV RBC AUTO: 85.7 FL
MONOCYTES # BLD AUTO: 0.87 X10(3) UL (ref 0.1–1)
MONOCYTES NFR BLD AUTO: 8.4 %
NEUTROPHILS # BLD AUTO: 5.93 X10 (3) UL (ref 1.5–7.7)
NEUTROPHILS # BLD AUTO: 5.93 X10(3) UL (ref 1.5–7.7)
NEUTROPHILS NFR BLD AUTO: 57.1 %
OSMOLALITY SERPL CALC.SUM OF ELEC: 293 MOSM/KG (ref 275–295)
P AXIS: 68 DEGREES
P-R INTERVAL: 150 MS
PLATELET # BLD AUTO: 193 10(3)UL (ref 150–450)
POTASSIUM SERPL-SCNC: 3.2 MMOL/L (ref 3.5–5.1)
PROT SERPL-MCNC: 7.2 G/DL (ref 5.7–8.2)
PROTHROMBIN TIME: 12.6 SECONDS (ref 11.6–14.8)
Q-T INTERVAL: 276 MS
Q-T INTERVAL: 304 MS
QRS DURATION: 72 MS
QRS DURATION: 98 MS
QTC CALCULATION (BEZET): 414 MS
QTC CALCULATION (BEZET): 445 MS
R AXIS: -3 DEGREES
R AXIS: 30 DEGREES
RBC # BLD AUTO: 4.63 X10(6)UL
SODIUM SERPL-SCNC: 141 MMOL/L (ref 136–145)
T AXIS: 48 DEGREES
T AXIS: 52 DEGREES
TROPONIN I SERPL HS-MCNC: 9 NG/L
TROPONIN I SERPL HS-MCNC: <3 NG/L
TSI SER-ACNC: 1.39 MIU/ML (ref 0.55–4.78)
VENTRICULAR RATE: 129 BPM
VENTRICULAR RATE: 135 BPM
WBC # BLD AUTO: 10.4 X10(3) UL (ref 4–11)

## 2024-08-01 PROCEDURE — 99223 1ST HOSP IP/OBS HIGH 75: CPT | Performed by: INTERNAL MEDICINE

## 2024-08-01 PROCEDURE — 71045 X-RAY EXAM CHEST 1 VIEW: CPT | Performed by: EMERGENCY MEDICINE

## 2024-08-01 PROCEDURE — 70450 CT HEAD/BRAIN W/O DYE: CPT | Performed by: INTERNAL MEDICINE

## 2024-08-01 RX ORDER — MELATONIN
3 NIGHTLY PRN
Status: DISCONTINUED | OUTPATIENT
Start: 2024-08-01 | End: 2024-08-02

## 2024-08-01 RX ORDER — LISINOPRIL AND HYDROCHLOROTHIAZIDE 25; 20 MG/1; MG/1
1 TABLET ORAL DAILY
Status: CANCELLED | OUTPATIENT
Start: 2024-08-01

## 2024-08-01 RX ORDER — DILTIAZEM HYDROCHLORIDE 5 MG/ML
10 INJECTION INTRAVENOUS ONCE
Status: COMPLETED | OUTPATIENT
Start: 2024-08-01 | End: 2024-08-01

## 2024-08-01 RX ORDER — ADENOSINE 3 MG/ML
6 INJECTION, SOLUTION INTRAVENOUS ONCE
Status: COMPLETED | OUTPATIENT
Start: 2024-08-01 | End: 2024-08-01

## 2024-08-01 RX ORDER — ENOXAPARIN SODIUM 100 MG/ML
40 INJECTION SUBCUTANEOUS DAILY
Status: DISCONTINUED | OUTPATIENT
Start: 2024-08-02 | End: 2024-08-02

## 2024-08-01 RX ORDER — METOPROLOL TARTRATE 1 MG/ML
5 INJECTION, SOLUTION INTRAVENOUS ONCE
Status: DISCONTINUED | OUTPATIENT
Start: 2024-08-01 | End: 2024-08-01

## 2024-08-01 RX ORDER — VALACYCLOVIR HYDROCHLORIDE 500 MG/1
1000 TABLET, FILM COATED ORAL DAILY
Status: DISCONTINUED | OUTPATIENT
Start: 2024-08-02 | End: 2024-08-02

## 2024-08-01 RX ORDER — ONDANSETRON 2 MG/ML
4 INJECTION INTRAMUSCULAR; INTRAVENOUS EVERY 6 HOURS PRN
Status: DISCONTINUED | OUTPATIENT
Start: 2024-08-01 | End: 2024-08-02

## 2024-08-01 RX ORDER — LOSARTAN POTASSIUM AND HYDROCHLOROTHIAZIDE 12.5; 5 MG/1; MG/1
1 TABLET ORAL DAILY
COMMUNITY

## 2024-08-01 RX ORDER — DILTIAZEM HYDROCHLORIDE 120 MG/1
120 CAPSULE, EXTENDED RELEASE ORAL DAILY
Status: DISCONTINUED | OUTPATIENT
Start: 2024-08-02 | End: 2024-08-02

## 2024-08-01 RX ORDER — ADENOSINE 3 MG/ML
INJECTION, SOLUTION INTRAVENOUS
Status: COMPLETED
Start: 2024-08-01 | End: 2024-08-01

## 2024-08-01 RX ORDER — ACETAMINOPHEN 500 MG
500 TABLET ORAL EVERY 4 HOURS PRN
Status: DISCONTINUED | OUTPATIENT
Start: 2024-08-01 | End: 2024-08-02

## 2024-08-01 RX ORDER — POTASSIUM CHLORIDE 20 MEQ/1
40 TABLET, EXTENDED RELEASE ORAL ONCE
Status: COMPLETED | OUTPATIENT
Start: 2024-08-01 | End: 2024-08-01

## 2024-08-01 NOTE — ED INITIAL ASSESSMENT (HPI)
AMS on and off for the last two hours and hypertension. Having intermittent memory loss. No focal weakness

## 2024-08-01 NOTE — ED PROVIDER NOTES
Patient Seen in: [facility] Emergency Department      History     Chief Complaint   Patient presents with    Altered Mental Status     AMS on and off for the last two hours and hypertension. Having intermittent memory loss. No focal weakness     Stated Complaint: AMS on and off for the last two hours and hypertension.  Having intermittent me*    Subjective:   HPI    69-year-old female with a past medical history as below including hypertension, antiphospholipid antibody, SVT brought by EMS after patient started having palpitations with some dizziness/lightheadedness starting approximately 2 hours prior to arrival.  Per EMS report, family felt patient was intermittently confused.  Patient states she just forgot where she had put her phone but otherwise not feel like she was confused.  She denies any numbness or weakness of extremities.  Denies headache or visual changes.  She states she had a similar episode of palpitations yesterday but resolved after an hour.    Objective:   Past Medical History:    Anticardiolipin antibody positive    Antiphospholipid antibody positive    Arrhythmia    Broken toe    little toe left foot    Dry eyes    Essential hypertension    Gastroesophageal reflux disease with esophagitis    High cholesterol    HTN (hypertension)    Hx of adenomatous colonic polyps    Hypercoagulable state (HCC)    Iridocyclitis    Left eye pain    Left shoulder pain     (normal spontaneous vaginal delivery) (HCC)    x2    Palpitations    Normal echo, stress and holter     Retinal holes    OS    Retinal vein occlusion (HCC)    SVT (supraventricular tachycardia) (HCC)    Varicose veins              Past Surgical History:   Procedure Laterality Date    Cataract      Colonoscopy  2019    Egd N/A 2019    Procedure: ESOPHAGOGASTRODUODENOSCOPY, COLONOSCOPY, POSSIBLE BIOPSY, POSSIBLE POLYPECTOMY 71551, 18074;  Surgeon: Dave Sagastume MD;  [place] [facility]                 Social History     Socioeconomic History    Marital status:    Tobacco Use    Smoking status: Never    Smokeless tobacco: Never   Vaping Use    Vaping status: Never Used   Substance and Sexual Activity    Alcohol use: Yes     Alcohol/week: 0.0 standard drinks of alcohol     Comment: On a weekend once in awhile    Drug use: No   Other Topics Concern    Caffeine Concern Yes     Comment: coffee     Stress Concern No    Weight Concern No    Special Diet Yes     Comment: No white flour    Exercise Yes     Comment: walk- 30 min daily     Seat Belt Yes     Social Determinants of Health     Food Insecurity: Low Risk  (5/28/2024)    Received from [facility]    Food Insecurity     Have there been times that your food ran out, and you didn't have money to get more?: No     Are there times that you worry that this might happen?: No   Transportation Needs: Low Risk  (5/28/2024)    Received from [facility]    Transportation Needs     Do you have trouble getting transportation to medical appointments?: No     How do you normally get to and from your appointments?: Other              Review of Systems    Positive for stated Chief Complaint: Altered Mental Status (AMS on and off for the last two hours and hypertension. Having intermittent memory loss. No focal weakness)    Other systems are as noted in HPI.  Constitutional and vital signs reviewed.      All other systems reviewed and negative except as noted above.    Physical Exam     ED Triage Vitals [08/01/24 1542]   BP (!) 151/101   Pulse (!) 131   Resp 18   Temp 98.5 °F (36.9 °C)   Temp src    SpO2 96 %   O2 Device None (Room air)       Current Vitals:   Vital Signs  BP: (!) 159/103  Pulse: 93  Resp: 18  Temp: 98.5 °F (36.9 °C)  MAP (mmHg): (!) 112    Oxygen Therapy  SpO2: 95 %  O2 Device: None (Room air)            Physical Exam  Vitals and nursing note reviewed.   Constitutional:       Appearance: She is well-developed.    HENT:      Head: Normocephalic and atraumatic.      Mouth/Throat:      Mouth: Mucous membranes are moist.   Eyes:      General: No scleral icterus.  Cardiovascular:      Rate and Rhythm: Regular rhythm. Tachycardia present.   Pulmonary:      Effort: Pulmonary effort is normal.      Breath sounds: Normal breath sounds.   Abdominal:      Palpations: Abdomen is soft.      Tenderness: There is no abdominal tenderness.   Skin:     General: Skin is warm and dry.   Neurological:      General: No focal deficit present.      Mental Status: She is alert and oriented to person, place, and time.      Cranial Nerves: No cranial nerve deficit.      Motor: No weakness.   Psychiatric:         Mood and Affect: Mood normal.         Behavior: Behavior normal.               ED Course     Labs Reviewed   COMP METABOLIC PANEL (14) - Abnormal; Notable for the following components:       Result Value    Potassium 3.2 (*)     All other components within normal limits   TROPONIN I HIGH SENSITIVITY - Normal   TSH W REFLEX TO FREE T4 - Normal   PROTHROMBIN TIME (PT) - Normal   PTT, ACTIVATED - Normal   CBC WITH DIFFERENTIAL WITH PLATELET    Narrative:     The following orders were created for panel order CBC With Differential With Platelet.  Procedure                               Abnormality         Status                     ---------                               -----------         ------                     CBC W/ DIFFERENTIAL[[id number]]                              Final result                 Please view results for these tests on the individual orders.   RAINBOW DRAW BLUE   CBC W/ DIFFERENTIAL     EKG    Rate, intervals and axes as noted on EKG Report.  Rate: 129  Rhythm: SVT  Reading: Supraventricular tachycardia, unclear if it is accelerated junctional or AVNRT    EKG #2    Rate, intervals and axes as noted on EKG Report.  Rate: 135  Rhythm: Sinus Rhythm  Reading: Sinus tachycardia, nonspecific ST abnormality                 XR  CHEST AP PORTABLE  (CPT=71045)    Result Date: 8/1/2024  CONCLUSION:  Overlying EKG leads and defibrillator pads noted. Normal heart size and vascularity.  No effusion.  No CHF. The lungs are clear.  No mass or pneumonia.  No lymphadenopathy or pneumothorax.   LOCATION:  [place] [address]      Dictated by (CST): Bryan Cramer MD on 8/01/2024 at 5:40 PM     Finalized by (CST): Bryan Cramer MD on 8/01/2024 at 5:41 PM               A total of 48 minutes of critical care time (exclusive of billable procedures) was administered to manage the patient's unstable vital signs due to her tachycardia/SVT.  This involved direct patient intervention, complex decision making, and/or extensive discussions with the patient, family, and clinical staff.    MDM        69-year-old female with a past medical history as below including hypertension, antiphospholipid antibody, SVT brought by EMS after patient started having palpitations with some dizziness/lightheadedness starting approximately 2 hours prior to arrival.      Differential includes but is not limited to SVT, atrial tachycardia, junctional tachycardia, electrolyte abnormalityAdmission disposition: 8/1/2024  7:48 PM           Labs show mild hypokalemia, otherwise unremarkable with normal renal function, WBC, hemoglobin, TSH and troponin.    Independent interpretation of chest x-ray shows normal heart size and clear lungs.  Radiology report reviewed as above.    Patient was cardioverted with adenosine 6 mg successfully with change in rhythm to sinus tachycardia on EKG.  Heart rate continue to improve to the low 100s however continues to have significant ectopy with frequent PACs.  Blood pressure elevated to 1 50-1 60 over low 100s    Discussed with [facility] cardiology NP, recommends admission for monitoring.  Patient treated with additional dose of Cardizem 10 mg with improvement in heart rate to the 90s.  Discussed with hospitalist Dr. Richardson.                           Medical  Decision Making  Amount and/or Complexity of Data Reviewed  Labs: ordered. Decision-making details documented in ED Course.  Radiology: ordered and independent interpretation performed. Decision-making details documented in ED Course.  ECG/medicine tests: ordered and independent interpretation performed. Decision-making details documented in ED Course.  Discussion of management or test interpretation with external provider(s): Cardiology, hospitalist    Risk  Decision regarding hospitalization.        Disposition and Plan     Clinical Impression:  1. SVT (supraventricular tachycardia) (HCC)    2. PAC (premature atrial contraction)    3. Poorly-controlled hypertension         Disposition:  Admit  8/1/2024  7:48 pm    Follow-up:  No follow-up provider specified.        Medications Prescribed:  Current Discharge Medication List                            Hospital Problems       Present on Admission  Date Reviewed: 6/28/2024            ICD-10-CM Noted POA    * (Principal) SVT (supraventricular tachycardia) (HCC) I47.10 8/1/2024 Unknown

## 2024-08-02 ENCOUNTER — APPOINTMENT (OUTPATIENT)
Dept: CV DIAGNOSTICS | Facility: HOSPITAL | Age: 69
End: 2024-08-02
Attending: INTERNAL MEDICINE
Payer: MEDICARE

## 2024-08-02 ENCOUNTER — TELEPHONE (OUTPATIENT)
Dept: INTERNAL MEDICINE CLINIC | Facility: CLINIC | Age: 69
End: 2024-08-02

## 2024-08-02 ENCOUNTER — HOSPITAL ENCOUNTER (INPATIENT)
Facility: HOSPITAL | Age: 69
LOS: 3 days | Discharge: HOME | End: 2024-08-05
Attending: EMERGENCY MEDICINE | Admitting: INTERNAL MEDICINE
Payer: MEDICARE

## 2024-08-02 VITALS
HEIGHT: 67 IN | SYSTOLIC BLOOD PRESSURE: 137 MMHG | BODY MASS INDEX: 27.82 KG/M2 | DIASTOLIC BLOOD PRESSURE: 90 MMHG | RESPIRATION RATE: 15 BRPM | OXYGEN SATURATION: 92 % | HEART RATE: 81 BPM | TEMPERATURE: 98 F | WEIGHT: 177.25 LBS

## 2024-08-02 DIAGNOSIS — I47.19 JUNCTIONAL TACHYCARDIA (HCC): Primary | ICD-10-CM

## 2024-08-02 DIAGNOSIS — F41.9 ANXIETY: ICD-10-CM

## 2024-08-02 LAB
ALBUMIN SERPL-MCNC: 4.2 G/DL (ref 3.2–4.8)
ALBUMIN/GLOB SERPL: 1.8 {RATIO} (ref 1–2)
ALP LIVER SERPL-CCNC: 59 U/L
ALT SERPL-CCNC: 23 U/L
ANION GAP SERPL CALC-SCNC: 5 MMOL/L (ref 0–18)
ANION GAP SERPL CALC-SCNC: 7 MMOL/L (ref 0–18)
AST SERPL-CCNC: 26 U/L (ref ?–34)
ATRIAL RATE: 76 BPM
BASOPHILS # BLD AUTO: 0.04 X10(3) UL (ref 0–0.2)
BASOPHILS # BLD AUTO: 0.04 X10(3) UL (ref 0–0.2)
BASOPHILS NFR BLD AUTO: 0.5 %
BASOPHILS NFR BLD AUTO: 0.6 %
BILIRUB SERPL-MCNC: 0.5 MG/DL (ref 0.2–1.1)
BUN BLD-MCNC: 12 MG/DL (ref 9–23)
BUN BLD-MCNC: 21 MG/DL (ref 9–23)
CALCIUM BLD-MCNC: 9.2 MG/DL (ref 8.7–10.4)
CALCIUM BLD-MCNC: 9.2 MG/DL (ref 8.7–10.4)
CHLORIDE SERPL-SCNC: 107 MMOL/L (ref 98–112)
CHLORIDE SERPL-SCNC: 108 MMOL/L (ref 98–112)
CO2 SERPL-SCNC: 25 MMOL/L (ref 21–32)
CO2 SERPL-SCNC: 28 MMOL/L (ref 21–32)
CREAT BLD-MCNC: 0.64 MG/DL
CREAT BLD-MCNC: 0.91 MG/DL
EGFRCR SERPLBLD CKD-EPI 2021: 69 ML/MIN/1.73M2 (ref 60–?)
EGFRCR SERPLBLD CKD-EPI 2021: 97 ML/MIN/1.73M2 (ref 60–?)
EOSINOPHIL # BLD AUTO: 0.35 X10(3) UL (ref 0–0.7)
EOSINOPHIL # BLD AUTO: 0.37 X10(3) UL (ref 0–0.7)
EOSINOPHIL NFR BLD AUTO: 4.6 %
EOSINOPHIL NFR BLD AUTO: 5.3 %
ERYTHROCYTE [DISTWIDTH] IN BLOOD BY AUTOMATED COUNT: 12.9 %
ERYTHROCYTE [DISTWIDTH] IN BLOOD BY AUTOMATED COUNT: 13 %
GLOBULIN PLAS-MCNC: 2.4 G/DL (ref 2–3.5)
GLUCOSE BLD-MCNC: 126 MG/DL (ref 70–99)
GLUCOSE BLD-MCNC: 82 MG/DL (ref 70–99)
HCT VFR BLD AUTO: 36.5 %
HCT VFR BLD AUTO: 40 %
HGB BLD-MCNC: 12.7 G/DL
HGB BLD-MCNC: 13.7 G/DL
IMM GRANULOCYTES # BLD AUTO: 0.04 X10(3) UL (ref 0–1)
IMM GRANULOCYTES # BLD AUTO: 0.05 X10(3) UL (ref 0–1)
IMM GRANULOCYTES NFR BLD: 0.6 %
IMM GRANULOCYTES NFR BLD: 0.6 %
LYMPHOCYTES # BLD AUTO: 2.05 X10(3) UL (ref 1–4)
LYMPHOCYTES # BLD AUTO: 2.35 X10(3) UL (ref 1–4)
LYMPHOCYTES NFR BLD AUTO: 29.3 %
LYMPHOCYTES NFR BLD AUTO: 30.8 %
MCH RBC QN AUTO: 30.5 PG (ref 26–34)
MCH RBC QN AUTO: 30.5 PG (ref 26–34)
MCHC RBC AUTO-ENTMCNC: 34.3 G/DL (ref 31–37)
MCHC RBC AUTO-ENTMCNC: 34.8 G/DL (ref 31–37)
MCV RBC AUTO: 87.5 FL
MCV RBC AUTO: 89.1 FL
MONOCYTES # BLD AUTO: 0.6 X10(3) UL (ref 0.1–1)
MONOCYTES # BLD AUTO: 0.74 X10(3) UL (ref 0.1–1)
MONOCYTES NFR BLD AUTO: 9 %
MONOCYTES NFR BLD AUTO: 9.2 %
NEUTROPHILS # BLD AUTO: 3.57 X10 (3) UL (ref 1.5–7.7)
NEUTROPHILS # BLD AUTO: 3.57 X10(3) UL (ref 1.5–7.7)
NEUTROPHILS # BLD AUTO: 4.47 X10 (3) UL (ref 1.5–7.7)
NEUTROPHILS # BLD AUTO: 4.47 X10(3) UL (ref 1.5–7.7)
NEUTROPHILS NFR BLD AUTO: 53.7 %
NEUTROPHILS NFR BLD AUTO: 55.8 %
OSMOLALITY SERPL CALC.SUM OF ELEC: 291 MOSM/KG (ref 275–295)
OSMOLALITY SERPL CALC.SUM OF ELEC: 293 MOSM/KG (ref 275–295)
P-R INTERVAL: 160 MS
PLATELET # BLD AUTO: 178 10(3)UL (ref 150–450)
PLATELET # BLD AUTO: 192 10(3)UL (ref 150–450)
POTASSIUM SERPL-SCNC: 3.2 MMOL/L (ref 3.5–5.1)
POTASSIUM SERPL-SCNC: 3.5 MMOL/L (ref 3.5–5.1)
PROT SERPL-MCNC: 6.6 G/DL (ref 5.7–8.2)
Q-T INTERVAL: 394 MS
QRS DURATION: 74 MS
QTC CALCULATION (BEZET): 443 MS
R AXIS: 6 DEGREES
RBC # BLD AUTO: 4.17 X10(6)UL
RBC # BLD AUTO: 4.49 X10(6)UL
SODIUM SERPL-SCNC: 139 MMOL/L (ref 136–145)
SODIUM SERPL-SCNC: 141 MMOL/L (ref 136–145)
T AXIS: 67 DEGREES
TROPONIN I SERPL HS-MCNC: 7 NG/L
TROPONIN I SERPL HS-MCNC: 7 NG/L
TROPONIN I SERPL HS-MCNC: 8 NG/L
VENTRICULAR RATE: 76 BPM
WBC # BLD AUTO: 6.7 X10(3) UL (ref 4–11)
WBC # BLD AUTO: 8 X10(3) UL (ref 4–11)

## 2024-08-02 PROCEDURE — 93306 TTE W/DOPPLER COMPLETE: CPT | Performed by: INTERNAL MEDICINE

## 2024-08-02 PROCEDURE — 99239 HOSP IP/OBS DSCHRG MGMT >30: CPT | Performed by: HOSPITALIST

## 2024-08-02 PROCEDURE — 99223 1ST HOSP IP/OBS HIGH 75: CPT | Performed by: INTERNAL MEDICINE

## 2024-08-02 RX ORDER — POLYETHYLENE GLYCOL 3350 17 G/17G
17 POWDER, FOR SOLUTION ORAL DAILY PRN
Status: DISCONTINUED | OUTPATIENT
Start: 2024-08-02 | End: 2024-08-05

## 2024-08-02 RX ORDER — ENOXAPARIN SODIUM 100 MG/ML
40 INJECTION SUBCUTANEOUS DAILY
Status: DISCONTINUED | OUTPATIENT
Start: 2024-08-03 | End: 2024-08-05

## 2024-08-02 RX ORDER — SENNOSIDES 8.6 MG
17.2 TABLET ORAL NIGHTLY PRN
Status: DISCONTINUED | OUTPATIENT
Start: 2024-08-02 | End: 2024-08-05

## 2024-08-02 RX ORDER — MELATONIN
3 NIGHTLY PRN
Status: DISCONTINUED | OUTPATIENT
Start: 2024-08-02 | End: 2024-08-05

## 2024-08-02 RX ORDER — DILTIAZEM HYDROCHLORIDE 120 MG/1
120 CAPSULE, EXTENDED RELEASE ORAL DAILY
Status: DISCONTINUED | OUTPATIENT
Start: 2024-08-02 | End: 2024-08-02

## 2024-08-02 RX ORDER — SODIUM CHLORIDE, SODIUM LACTATE, POTASSIUM CHLORIDE, CALCIUM CHLORIDE 600; 310; 30; 20 MG/100ML; MG/100ML; MG/100ML; MG/100ML
INJECTION, SOLUTION INTRAVENOUS CONTINUOUS
Status: ACTIVE | OUTPATIENT
Start: 2024-08-02 | End: 2024-08-03

## 2024-08-02 RX ORDER — ENEMA 19; 7 G/133ML; G/133ML
1 ENEMA RECTAL ONCE AS NEEDED
Status: DISCONTINUED | OUTPATIENT
Start: 2024-08-02 | End: 2024-08-05

## 2024-08-02 RX ORDER — ONDANSETRON 2 MG/ML
4 INJECTION INTRAMUSCULAR; INTRAVENOUS EVERY 6 HOURS PRN
Status: DISCONTINUED | OUTPATIENT
Start: 2024-08-02 | End: 2024-08-05

## 2024-08-02 RX ORDER — DILTIAZEM HYDROCHLORIDE 240 MG/1
240 CAPSULE, COATED, EXTENDED RELEASE ORAL DAILY
Qty: 90 CAPSULE | Refills: 3 | Status: SHIPPED | OUTPATIENT
Start: 2024-08-02

## 2024-08-02 RX ORDER — ACETAMINOPHEN 500 MG
500 TABLET ORAL EVERY 4 HOURS PRN
Status: DISCONTINUED | OUTPATIENT
Start: 2024-08-02 | End: 2024-08-05

## 2024-08-02 RX ORDER — POTASSIUM CHLORIDE 20 MEQ/1
40 TABLET, EXTENDED RELEASE ORAL EVERY 4 HOURS
Status: COMPLETED | OUTPATIENT
Start: 2024-08-02 | End: 2024-08-03

## 2024-08-02 RX ORDER — BISACODYL 10 MG
10 SUPPOSITORY, RECTAL RECTAL
Status: DISCONTINUED | OUTPATIENT
Start: 2024-08-02 | End: 2024-08-05

## 2024-08-02 RX ORDER — VALACYCLOVIR HYDROCHLORIDE 500 MG/1
1000 TABLET, FILM COATED ORAL DAILY
Status: DISCONTINUED | OUTPATIENT
Start: 2024-08-02 | End: 2024-08-02

## 2024-08-02 NOTE — PROGRESS NOTES
08/01/24 2151 08/01/24 2152 08/01/24 2154   Vital Signs   /71 152/90 132/88   MAP (mmHg) 89 (!) 106 (!) 102   BP Location Left arm Left arm Left arm   BP Method Automatic Automatic Automatic   Patient Position Lying Sitting Standing     ADM orthostatic VS

## 2024-08-02 NOTE — PLAN OF CARE
NURSING NOTES:  0800: Pt received AOx4. Room air. SR. Saline lock. Denies pain.  1500: Pt to discharge home. Okay with cardiology. Denies pain.    Problem: CARDIOVASCULAR - ADULT  Goal: Maintains optimal cardiac output and hemodynamic stability  Description: INTERVENTIONS:  - Monitor vital signs, rhythm, and trends  - Monitor for bleeding, hypotension and signs of decreased cardiac output  - Evaluate effectiveness of vasoactive medications to optimize hemodynamic stability  - Monitor arterial and/or venous puncture sites for bleeding and/or hematoma  - Assess quality of pulses, skin color and temperature  - Assess for signs of decreased coronary artery perfusion - ex. Angina  - Evaluate fluid balance, assess for edema, trend weights  Outcome: Progressing  Goal: Absence of cardiac arrhythmias or at baseline  Description: INTERVENTIONS:  - Continuous cardiac monitoring, monitor vital signs, obtain 12 lead EKG if indicated  - Evaluate effectiveness of antiarrhythmic and heart rate control medications as ordered  - Initiate emergency measures for life threatening arrhythmias  - Monitor electrolytes and administer replacement therapy as ordered  Outcome: Progressing

## 2024-08-02 NOTE — PLAN OF CARE
DISCHARGE NOTES:  1630: Discharge instructions discussed with patient such as follow up appointments, medications to take and signs and symptoms to report to MD.  Pt verbalized understanding. Discharge pt to home via wheelchair accompanied by PCT.    Problem: Patient/Family Goals  Goal: Patient/Family Long Term Goal  Description: Patient's Long Term Goal: Free of chest discomfort    Interventions:  - Follow up with healthcare plan post  -Adhere to medication regiment   - See additional Care Plan goals for specific interventions  Outcome: Adequate for Discharge  Goal: Patient/Family Short Term Goal  Description: Patient's Short Term Goal: Discharge home     Interventions:   - Monitor cardiac rhythm   - See additional Care Plan goals for specific interventions  Outcome: Adequate for Discharge     Problem: CARDIOVASCULAR - ADULT  Goal: Maintains optimal cardiac output and hemodynamic stability  Description: INTERVENTIONS:  - Monitor vital signs, rhythm, and trends  - Monitor for bleeding, hypotension and signs of decreased cardiac output  - Evaluate effectiveness of vasoactive medications to optimize hemodynamic stability  - Monitor arterial and/or venous puncture sites for bleeding and/or hematoma  - Assess quality of pulses, skin color and temperature  - Assess for signs of decreased coronary artery perfusion - ex. Angina  - Evaluate fluid balance, assess for edema, trend weights  8/2/2024 1514 by Vi Jovel, RN  Outcome: Adequate for Discharge  8/2/2024 1047 by Vi Jovel, RN  Outcome: Progressing  Goal: Absence of cardiac arrhythmias or at baseline  Description: INTERVENTIONS:  - Continuous cardiac monitoring, monitor vital signs, obtain 12 lead EKG if indicated  - Evaluate effectiveness of antiarrhythmic and heart rate control medications as ordered  - Initiate emergency measures for life threatening arrhythmias  - Monitor electrolytes and administer replacement therapy as ordered  8/2/2024 6564  by Vi Jovel, RN  Outcome: Adequate for Discharge  8/2/2024 1047 by Vi Jovel, RN  Outcome: Progressing

## 2024-08-02 NOTE — ED QUICK NOTES
Orders for admission, patient is aware of plan and ready to go upstairs. Any questions, please call ED RN Mansi at extension [phone].     Patient Covid vaccination status: Fully vaccinated     COVID Test Ordered in ED: None    COVID Suspicion at Admission: N/A    Running Infusions:  None    Mental Status/LOC at time of transport: A+O x4    Other pertinent information:   CIWA score: N/A   NIH score:  N/A

## 2024-08-02 NOTE — PLAN OF CARE
Pt admitted from ER at 2130 with son present at bedside. Aox4, Denies pain but report mild tightness to substernal chest,  RA. EKG completed- Sinus arrhythmia/NSR on EKG and TELE. MD informed of admission and med rec completion, orders provided to continue select medications (See orders*). SBA w/BRP. Pt updated and agrees w/POC.          Problem: Patient/Family Goals  Goal: Patient/Family Long Term Goal  Description: Patient's Long Term Goal: Free of chest discomfort    Interventions:  - Follow up with healthcare plan post  -Adhere to medication regiment   - See additional Care Plan goals for specific interventions  Outcome: Progressing  Goal: Patient/Family Short Term Goal  Description: Patient's Short Term Goal: Discharge home     Interventions:   - Monitor cardiac rhythm   - See additional Care Plan goals for specific interventions  Outcome: Progressing

## 2024-08-02 NOTE — PLAN OF CARE
Echo overall unremarkable. Tele stable. Ok to dc home on increased dose of cardizem cd. Will arrange 7 day mct and EP follow up with our office. Discussed with Dr Wooten.     Eneida Bermeo NP  8/2/2024    [phone]

## 2024-08-02 NOTE — SPIRITUAL CARE NOTE
Spiritual Care Visit Note    Patient Name: Danuta Baca Date of Spiritual Care Visit: 24   : 5/10/1955 Primary Dx: SVT (supraventricular tachycardia) (HCC)       Referred By: Referral From: Nurse    Spiritual Care Taxonomy:    Intended Effects: Demonstrate caring and concern    Methods: Offer spiritual/Anabaptism support         Visit Type/Summary:     - Spiritual Care: Provided support for Patient's spiritual/Anabaptism requests. Patient declined services.      Spiritual Care support can be requested via an Epic consult. For urgent/immediate needs, please contact the On Call  at: Edward: ext [phone]    [place] GodfreyLalito  [facility]

## 2024-08-02 NOTE — PLAN OF CARE
Danuta Baca Patient Status:  Inpatient    5/10/1955 MRN [id number]   Location [facility] 2NE-A Attending Kim Richardson, DO   Hosp Day # 1 PCP Teressa Pickard MD       Cardiology Nocturnal APN Note    Page Received: Dr. Grajeda, ED Physician    HPI:     Patient is a 69 year old female with PMH of HTN, HLD, SVT-on Diltiazem, antiphospholipid antibody, and GERD who presented to the ED with palpitations with dizziness and lightheadedness. Pt reported symptoms started around 2 hours prior to ED. Pt reported similar episodes over the past couple of days. Pt denied chest pain but reported some chest tightness. On arrival to ED, initial EKG showed pt in accelerated junctional rhythm vs PSVT with rate in the 120s-130s. 6 mg of adenosine was given with no change in rhythm. Cardizem 10 mg IVP was then given. Patient was also noted to be hypertensive with SBP 150s-160s. Cardiac work up in ED was unremarkable for acute findings. MCI consulted for possible SVT and hypertension. Stress echocardiogram in  was unremarkable. EF 60%. HPI obtained from chart review and information provided by ED physician.         ED Clinical Course    EKG: As noted above in HPI    Labs: K+ 3.2, troponin negative    Imaging: CXR unremarkable  CT brain/head negative for acute process    Medications: Adenosine 6 mg IVP, diltiazem 10 mg IVP, PO potassium        Assessment/Plan:    - Troponin negative x 3  - Continue to monitor overnight on telemetry  - Formal cardiology consult to follow in AM.       JESS Najera  [facility]  2024  1:55 AM

## 2024-08-02 NOTE — DISCHARGE SUMMARY
[place] [facility]  DISCHARGE SUMMARY     Danuta Baca Patient Status:  Inpatient    5/10/1955 MRN [id number]   Location [facility] 2NE-A Attending Joey Wooten MD   Hosp Day # 1 PCP Teressa Pickard MD     Date of Admission: 2024  Date of Discharge:   24    Discharge Disposition: Home or Self Care    Discharge Diagnosis:  #Palpitations, lightheadedness   #recurrent SVT  - converted to NSR in ED with adenosine 6mg x1 dose  - telemetry  - echo unremarkable  - diltiazem  - cardiology consulted from ED  -d/w cards. Increase dilt. Dc on MCT. F/u EP as outpt    #Transient AMS  - CTH ordered  - neurological exam unremarkable    #HTN  - lisinopril-hydrochlorothiazide, diltiazem     #Hx of antiphospholipid antibody syndrome     History of Present Illness: Danuta Baca is a 69 year old female with pmhx of SVT, HTN, HLD who presents with complaint of palpitations. She reports she has had 2 episodes of heart racing and palpitations over the last few days. First one occurred two days ago while she was out walking her dog in the heat. She felt heart racing and lightheaded and symptoms lasted about an hour. No associated chest pain, but did feel some mild chest \"tightness\", no SOB/PALMER, abdominal pain, n/v/d, fevers/chills, URI/flu-like symptoms, recent sick contacts. Symptoms occurred again today and lasted several hours this time. Her son found her in a confused state, staring at her phone and unable to answer questions. This lasted ~5 minutes and then resolved. She has no memory of this event. She denies any facial droop, slurred speech, focal weakness, numbness/tingling, visual changes. She has history of SVT and is currently on diltiazem. She doesn't think she has missed any doses. Palpitations resolved after administration of adenosine in ED. Currently, she has no complaints.        Brief Synopsis: pt w/ SVT. Converted w/ adenosine. Likely AVNRT. Sinus throughout the day and asx. BP slightly high. Echo  ok. Dilt to increase and to DC w/ MCT. F/u EP as outpt. Ablation consideration at some point in the future.     Lace+ Score: 31  59-90 High Risk  29-58 Medium Risk  0-28   Low Risk       TCM Follow-Up Recommendation:  LACE 29-58: Moderate Risk of readmission after discharge from the hospital.  **Certification    Admission date was 8/1/2024.  Inpatient stay was shorter than expected.  Patient's SVT (supraventricular tachycardia) (HCC) was initially serious enough to expect a more lengthy hospitalization but patient improved faster than expected.                 Procedures during hospitalization:   none    Incidental or significant findings and recommendations (brief descriptions):  none    Lab/Test results pending at Discharge:   none    Consultants:  cards    Discharge Medication List:     Discharge Medications        CHANGE how you take these medications        Instructions Prescription details   dilTIAZem  MG Cp24  Commonly known as: Cartia XT  What changed:   medication strength  how much to take      Take 1 capsule (240 mg total) by mouth daily.   Quantity: 90 capsule  Refills: 3            CONTINUE taking these medications        Instructions Prescription details   CALCIUM-D OR      Take 1 tablet by mouth daily.   Refills: 0     Fluocinolone Acetonide 0.01 % Oil      1 drop by Each ear route once a week. As needed   Refills: 4     losartan-hydroCHLOROthiazide 50-12.5 MG Tabs  Commonly known as: Hyzaar      Take 1 tablet by mouth daily.   Refills: 0     NON FORMULARY      Take 1 capsule by mouth every evening. [employer] EZ Tears   Refills: 0     NON FORMULARY      Take 1 capsule by mouth every morning. [employer] Restore   Refills: 0     ONE A DAY WOMEN 50 PLUS OR      Take 1 tablet by mouth daily.   Refills: 0     valACYclovir 1 G Tabs  Commonly known as: Valtrex      Take 1 tablet (1,000 mg total) by mouth daily.   Quantity: 90 tablet  Refills: 3               Where to Get Your Medications        These  medications were sent to [id number] - [place], [place] - [address] AT [facility] [address], [phone], [phone]  [address], [facility] [address]      Phone: [phone]   dilTIAZem  MG Cp24         [place]    Follow-up appointment:   Teressa Pickard MD  [address] [facility]  [address]  [facility] [address] [phone]    Schedule an appointment as soon as possible for a visit in 1 week(s)      Eric Quinones MD  [address] [facility]  [address] [facility] [address] [phone]    Follow up  Our office will call you in 1-2 office days to schedule you a follow up appointment and your heart monitor    Appointments for Next 30 Days 2024 - 2024      None            Vital signs:  Temp:  [97.2 °F (36.2 °C)-98.5 °F (36.9 °C)] 98.4 °F (36.9 °C)  Pulse:  [] 81  Resp:  [10-26] 15  BP: (131-176)/() 137/90  SpO2:  [90 %-97 %] 92 %    Physical Exam:    General: No acute distress   Lungs: clear to auscultation  Cardiovascular: S1, S2  Abdomen: Soft      -----------------------------------------------------------------------------------------------  PATIENT DISCHARGE INSTRUCTIONS: See electronic chart    Joey Wooten MD    Total time spent on discharge plannin minutes     The  Cures Act makes medical notes like these available to patients in the interest of transparency. Please be advised this is a medical document. Medical documents are intended to carry relevant information, facts as evident, and the clinical opinion of the practitioner. The medical note is intended as peer to peer communication and may appear blunt or direct. It is written in medical language and may contain abbreviations or verbiage that are unfamiliar.

## 2024-08-02 NOTE — CONSULTS
[facility]  Cardiology Consultation    Danuta Baca Patient Status:  Inpatient    5/10/1955 MRN [id number]   Location [facility] 2NE-A Attending Joey Wooten MD   Hosp Day # 1 PCP Teressa Pickard MD     Reason for Consultation:  SVT/palpitations/HTN    History of Present Illness:  Danuta Baca is a a(n) 69 year old female with history of HTN, HLP and prior palpitations who presents with several days hx of palpitations.  Had them previously.  Associates them to caffeine use - did have some chocolate last few days.    On this occasion she tells me she walked the dog in very hot weather outside two days ago and then did quite a bit of strenuous house work yesterday.  Describes racing heart with sensation of tightness in the chest.    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 - given adenosine X1  Now in sinus rhythm on tele and Ecg confirmed in AM    She was recently switched from lisinopril to losartan hydrochlorothiazide - had cough with prior med  Also switched from metoprolol to diltiazem      History:  Past Medical History:    Anticardiolipin antibody positive    Antiphospholipid antibody positive    Arrhythmia    Broken toe    little toe left foot    Dry eyes    Essential hypertension    Gastroesophageal reflux disease with esophagitis    High cholesterol    HTN (hypertension)    Hx of adenomatous colonic polyps    Hypercoagulable state (HCC)    Iridocyclitis    Left eye pain    Left shoulder pain     (normal spontaneous vaginal delivery) (HCC)    x2    Palpitations    Normal echo, stress and holter     Retinal holes    OS    Retinal vein occlusion (HCC)    SVT (supraventricular tachycardia) (HCC)    Varicose veins     Past Surgical History:   Procedure Laterality Date    Cataract      Colonoscopy  2019    Egd N/A 2019    Procedure: ESOPHAGOGASTRODUODENOSCOPY, COLONOSCOPY, POSSIBLE BIOPSY, POSSIBLE POLYPECTOMY 49185, [id number];  Surgeon: Dave Sagastume MD;  [facility]: [facility]     Family History   Problem Relation Age of Onset    Other (Alzheimer's) Father     Other (htn) Father     Glaucoma Mother     Other (skin cancer) Mother     Heart Attack Maternal Grandfather     Thyroid Disorder Sister     Other (severe hypertension) Brother     Arrhythmia Other       reports that she has never smoked. She has never used smokeless tobacco. She reports current alcohol use. She reports that she does not use drugs.    Allergies:  Allergies   Allergen Reactions    Amoxicillin HIVES    Amoxil     Cefaclor UNKNOWN    Ciprofloxacin     Pcns [Penicillins]        Medications:    Current Facility-Administered Medications:     dilTIAZem ER (Dilacor XR) 24 hr cap 120 mg, 120 mg, Oral, Daily    losartan (Cozaar) 50 mg, hydroCHLOROthiazide 12.5 mg for Hyzaar 50/12.5 (EEH only), , Oral, Daily    valACYclovir (Valtrex) tab 1,000 mg, 1,000 mg, Oral, Daily    melatonin tab 3 mg, 3 mg, Oral, Nightly PRN    ondansetron (Zofran) 4 MG/2ML injection 4 mg, 4 mg, Intravenous, Q6H PRN    enoxaparin (Lovenox) 40 MG/0.4ML SUBQ injection 40 mg, 40 mg, Subcutaneous, Daily    acetaminophen (Tylenol Extra Strength) tab 500 mg, 500 mg, Oral, Q4H PRN    Review of Systems:  A comprehensive review of systems was negative if not otherwise mention in above HPI.    BP (!) 144/93 (BP Location: Left arm)   Pulse 82   Temp 97.2 °F (36.2 °C) (Oral)   Resp 20   Ht 67\"   Wt 177 lb 4 oz   SpO2 95%   BMI 27.76 kg/m²   Temp (24hrs), Av °F (36.7 °C), Min:97.2 °F (36.2 °C), Max:98.5 °F (36.9 °C)       Intake/Output Summary (Last 24 hours) at 2024 0712  Last data filed at 2024 0504  Gross per 24 hour   Intake --   Output 1100 ml   Net -1100 ml     Wt Readings from Last 3 Encounters:   24 177 lb 4 oz   23 186 lb 6.4 oz   23 190 lb 6.4 oz       Physical Exam:   General: Alert and oriented x 3. No apparent distress. No respiratory or constitutional distress.  HEENT: Normocephalic, anicteric sclera, neck  supple.  Neck: No JVD   Cardiac: Regular rate and rhythm. S1, S2 normal. No murmur, pericardial rub, S3.  Lungs: Clear anteriorly  Extremities: Without edema  Neurologic: Alert and oriented, normal affect.  Skin: Warm and dry.     Laboratory Data:  Lab Results   Component Value Date    WBC 6.7 08/02/2024    HGB 12.7 08/02/2024    HCT 36.5 08/02/2024    .0 08/02/2024    CREATSERUM 0.64 08/02/2024    BUN 12 08/02/2024     08/02/2024    K 3.5 08/02/2024     08/02/2024    CO2 28.0 08/02/2024    GLU 82 08/02/2024    CA 9.2 08/02/2024    ALB 4.5 08/01/2024    ALKPHO 76 08/01/2024    BILT 0.5 08/01/2024    TP 7.2 08/01/2024    AST 26 08/01/2024    ALT 21 08/01/2024    PTT 31.1 08/01/2024    INR 0.94 08/01/2024    PTP 12.6 08/01/2024    TSH 1.390 08/01/2024       Imaging:  ECG this AM with sinus rhythm    Impression:  Principal Problem:    SVT (supraventricular tachycardia) (HCC)  Active Problems:    PAC (premature atrial contraction)    Poorly-controlled hypertension  SVT - comparing ECG when tachycardic to this AM suggests AVNRT/SVT rather than accelerated junctional rhythm    HTN - needs better control    Recommendations:  - check echo  - continue telemetry  - agree with long acting diltiazem  - uptitrate losartan/hydrochlorothiazide as needed for HTN    Thank you for allowing me to participate in the care of your patient.    Hemanth Matos MD  8/2/2024  7:12 AM

## 2024-08-02 NOTE — H&P
[facility]     Danuta Baca Patient Status:  Emergency    5/10/1955 MRN [id number]   [facility] EMERGENCY DEPARTMENT Attending Ap Grajeda MD   Hosp Day # 0 PCP Teressa Pickard MD     Chief Complaint: palpitations, lightheadedness    Subjective:    History of Present Illness:     Danuta Baca is a 69 year old female with pmhx of SVT, HTN, HLD who presents with complaint of palpitations. She reports she has had 2 episodes of heart racing and palpitations over the last few days. First one occurred two days ago while she was out walking her dog in the heat. She felt heart racing and lightheaded and symptoms lasted about an hour. No associated chest pain, but did feel some mild chest \"tightness\", no SOB/PALMER, abdominal pain, n/v/d, fevers/chills, URI/flu-like symptoms, recent sick contacts. Symptoms occurred again today and lasted several hours this time. Her son found her in a confused state, staring at her phone and unable to answer questions. This lasted ~5 minutes and then resolved. She has no memory of this event. She denies any facial droop, slurred speech, focal weakness, numbness/tingling, visual changes. She has history of SVT and is currently on diltiazem. She doesn't think she has missed any doses. Palpitations resolved after administration of adenosine in ED. Currently, she has no complaints.     History/Other:    Past Medical History:  Past Medical History:    Anticardiolipin antibody positive    Antiphospholipid antibody positive    Arrhythmia    Broken toe    little toe left foot    Dry eyes    Essential hypertension    Gastroesophageal reflux disease with esophagitis    High cholesterol    HTN (hypertension)    Hx of adenomatous colonic polyps    Hypercoagulable state (HCC)    Iridocyclitis    Left eye pain    Left shoulder pain     (normal spontaneous vaginal delivery) (HCC)    x2    Palpitations    Normal echo, stress and holter      Retinal holes    OS    Retinal vein occlusion (HCC)    SVT (supraventricular tachycardia) (HCC)    Varicose veins     Past Surgical History:   Past Surgical History:   Procedure Laterality Date    Cataract      Colonoscopy  05/13/2019    Egd N/A 5/13/2019    Procedure: ESOPHAGOGASTRODUODENOSCOPY, COLONOSCOPY, POSSIBLE BIOPSY, POSSIBLE POLYPECTOMY 99558, 91855;  Surgeon: Dave Sagastume MD;  [facility], [facility]      Family History:   Family History   Problem Relation Age of Onset    Other (Alzheimer's) Father     Other (htn) Father     Glaucoma Mother     Other (skin cancer) Mother     Heart Attack Maternal Grandfather     Thyroid Disorder Sister     Other (severe hypertension) Brother     Arrhythmia Other      Social History:    reports that she has never smoked. She has never used smokeless tobacco. She reports current alcohol use. She reports that she does not use drugs.     Allergies:   Allergies   Allergen Reactions    Amoxil     Cefaclor UNKNOWN    Ciprofloxacin     Pcns [Penicillins]        Medications:    No current facility-administered medications on file prior to encounter.     Current Outpatient Medications on File Prior to Encounter   Medication Sig Dispense Refill    Calcium Carbonate-Vitamin D (CALCIUM-D OR) Take by mouth daily.      lisinopril-hydroCHLOROthiazide 20-25 MG Oral Tab Take 1 tablet by mouth daily. 90 tablet 3    dilTIAZem ER (CARTIA XT) 120 MG Oral Capsule SR 24 Hr Take 1 capsule (120 mg total) by mouth daily. 90 capsule 3    valACYclovir 1 G Oral Tab Take 1 tablet (1,000 mg total) by mouth daily. 90 tablet 3    Multiple Vitamins-Minerals (EYE VITAMINS & MINERALS OR) Take 1 tablet by mouth 2 (two) times daily.      ibuprofen 200 MG Oral Tab Take 2 tablets (400 mg total) by mouth every 6 (six) hours as needed for Pain.      acetaminophen 325 MG Oral Tab Take 2 tablets (650 mg total) by mouth every 6 (six) hours as needed for Pain.      famoTIDine 20 MG Oral Tab Take 1  tablet (20 mg total) by mouth daily as needed for Heartburn.      Fluocinolone Acetonide 0.01 % Otic Oil 1 drop by Each ear route twice a week. As needed  4       Review of Systems:   A comprehensive review of systems was completed.    Pertinent positives and negatives noted in the HPI.    Objective:   Physical Exam:    BP (!) 159/103   Pulse 93   Temp 98.5 °F (36.9 °C)   Resp 18   Ht 5' 7\" (1.702 m)   Wt 180 lb (81.6 kg)   SpO2 95%   BMI 28.19 kg/m²   General: No acute distress, Alert  Respiratory: No rhonchi, no wheezes  Cardiovascular: S1, S2. Regular rate and rhythm  Abdomen: Soft, Non-tender, non-distended, positive bowel sounds  Neuro: No new focal deficits  Extremities: No edema    Results:    Labs:      Labs Last 24 Hours:    Recent Labs   Lab 08/01/24  1544   RBC 4.63   HGB 14.1   HCT 39.7   MCV 85.7   MCH 30.5   MCHC 35.5   RDW 12.7   NEPRELIM 5.93   WBC 10.4   .0       Recent Labs   Lab 08/01/24  1544   GLU 95   BUN 17   CREATSERUM 0.79   EGFRCR 82   CA 9.8   ALB 4.5      K 3.2*      CO2 28.0   ALKPHO 76   AST 26   ALT 21   BILT 0.5   TP 7.2       Lab Results   Component Value Date    INR 0.94 08/01/2024       Recent Labs   Lab 08/01/24  1544   TROPHS <3       No results for input(s): \"TROP\", \"PBNP\" in the last 168 hours.    No results for input(s): \"PCT\" in the last 168 hours.    Imaging: Imaging data reviewed in Epic.    Assessment & Plan:      #Palpitations, lightheadedness   #recurrent SVT  - converted to NSR in ED with adenosine 6mg x1 dose  - telemetry  - replace electrolytes   - echo ordered  - PTA diltiazem  - cardiology consulted from ED    #Transient AMS  - CTH ordered  - neurological exam unremarkable    #HTN  - lisinopril-hydrochlorothiazide, diltiazem     #Hx of antiphospholipid antibody syndrome         Plan of care discussed with pt, ED    Kim Richardson DO    Supplementary Documentation:     The 21st Century Cures Act makes medical notes like these available  to patients in the interest of transparency. Please be advised this is a medical document. Medical documents are intended to carry relevant information, facts as evident, and the clinical opinion of the practitioner. The medical note is intended as peer to peer communication and may appear blunt or direct. It is written in medical language and may contain abbreviations or verbiage that are unfamiliar.

## 2024-08-03 ENCOUNTER — APPOINTMENT (OUTPATIENT)
Dept: CT IMAGING | Facility: HOSPITAL | Age: 69
End: 2024-08-03
Attending: HOSPITALIST
Payer: MEDICARE

## 2024-08-03 ENCOUNTER — APPOINTMENT (OUTPATIENT)
Dept: GENERAL RADIOLOGY | Facility: HOSPITAL | Age: 69
End: 2024-08-03
Attending: HOSPITALIST
Payer: MEDICARE

## 2024-08-03 PROBLEM — R07.89 CHEST TIGHTNESS: Status: ACTIVE | Noted: 2024-08-03

## 2024-08-03 LAB
ADENOVIRUS PCR:: NOT DETECTED
ANION GAP SERPL CALC-SCNC: 6 MMOL/L (ref 0–18)
B PARAPERT DNA SPEC QL NAA+PROBE: NOT DETECTED
B PERT DNA SPEC QL NAA+PROBE: NOT DETECTED
BASOPHILS # BLD AUTO: 0.05 X10(3) UL (ref 0–0.2)
BASOPHILS NFR BLD AUTO: 0.8 %
BUN BLD-MCNC: 12 MG/DL (ref 9–23)
C PNEUM DNA SPEC QL NAA+PROBE: NOT DETECTED
CALCIUM BLD-MCNC: 9.4 MG/DL (ref 8.7–10.4)
CHLORIDE SERPL-SCNC: 108 MMOL/L (ref 98–112)
CO2 SERPL-SCNC: 26 MMOL/L (ref 21–32)
CORONAVIRUS 229E PCR:: NOT DETECTED
CORONAVIRUS HKU1 PCR:: NOT DETECTED
CORONAVIRUS NL63 PCR:: NOT DETECTED
CORONAVIRUS OC43 PCR:: NOT DETECTED
CREAT BLD-MCNC: 0.61 MG/DL
D DIMER PPP FEU-MCNC: 0.67 UG/ML FEU (ref ?–0.67)
EGFRCR SERPLBLD CKD-EPI 2021: 98 ML/MIN/1.73M2 (ref 60–?)
EOSINOPHIL # BLD AUTO: 0.32 X10(3) UL (ref 0–0.7)
EOSINOPHIL NFR BLD AUTO: 4.9 %
ERYTHROCYTE [DISTWIDTH] IN BLOOD BY AUTOMATED COUNT: 13 %
FLUAV RNA SPEC QL NAA+PROBE: NOT DETECTED
FLUBV RNA SPEC QL NAA+PROBE: NOT DETECTED
GLUCOSE BLD-MCNC: 88 MG/DL (ref 70–99)
HCT VFR BLD AUTO: 39 %
HGB BLD-MCNC: 13.6 G/DL
IMM GRANULOCYTES # BLD AUTO: 0.02 X10(3) UL (ref 0–1)
IMM GRANULOCYTES NFR BLD: 0.3 %
LYMPHOCYTES # BLD AUTO: 1.64 X10(3) UL (ref 1–4)
LYMPHOCYTES NFR BLD AUTO: 25.3 %
MAGNESIUM SERPL-MCNC: 1.8 MG/DL (ref 1.6–2.6)
MCH RBC QN AUTO: 30.6 PG (ref 26–34)
MCHC RBC AUTO-ENTMCNC: 34.9 G/DL (ref 31–37)
MCV RBC AUTO: 87.8 FL
METAPNEUMOVIRUS PCR:: NOT DETECTED
MONOCYTES # BLD AUTO: 0.56 X10(3) UL (ref 0.1–1)
MONOCYTES NFR BLD AUTO: 8.6 %
MYCOPLASMA PNEUMONIA PCR:: NOT DETECTED
NEUTROPHILS # BLD AUTO: 3.89 X10 (3) UL (ref 1.5–7.7)
NEUTROPHILS # BLD AUTO: 3.89 X10(3) UL (ref 1.5–7.7)
NEUTROPHILS NFR BLD AUTO: 60.1 %
OSMOLALITY SERPL CALC.SUM OF ELEC: 289 MOSM/KG (ref 275–295)
PARAINFLUENZA 1 PCR:: NOT DETECTED
PARAINFLUENZA 2 PCR:: NOT DETECTED
PARAINFLUENZA 3 PCR:: NOT DETECTED
PARAINFLUENZA 4 PCR:: NOT DETECTED
PHOSPHATE SERPL-MCNC: 3 MG/DL (ref 2.4–5.1)
PLATELET # BLD AUTO: 182 10(3)UL (ref 150–450)
POTASSIUM SERPL-SCNC: 3.9 MMOL/L (ref 3.5–5.1)
POTASSIUM SERPL-SCNC: 3.9 MMOL/L (ref 3.5–5.1)
RBC # BLD AUTO: 4.44 X10(6)UL
RHINOVIRUS/ENTERO PCR:: NOT DETECTED
RSV RNA SPEC QL NAA+PROBE: NOT DETECTED
SARS-COV-2 RNA NPH QL NAA+NON-PROBE: NOT DETECTED
SODIUM SERPL-SCNC: 140 MMOL/L (ref 136–145)
WBC # BLD AUTO: 6.5 X10(3) UL (ref 4–11)

## 2024-08-03 PROCEDURE — 71045 X-RAY EXAM CHEST 1 VIEW: CPT | Performed by: HOSPITALIST

## 2024-08-03 PROCEDURE — 71275 CT ANGIOGRAPHY CHEST: CPT | Performed by: HOSPITALIST

## 2024-08-03 PROCEDURE — 99232 SBSQ HOSP IP/OBS MODERATE 35: CPT | Performed by: HOSPITALIST

## 2024-08-03 RX ORDER — MAGNESIUM OXIDE 400 MG/1
400 TABLET ORAL ONCE
Status: COMPLETED | OUTPATIENT
Start: 2024-08-03 | End: 2024-08-03

## 2024-08-03 RX ORDER — DILTIAZEM HYDROCHLORIDE 240 MG/1
240 CAPSULE, COATED, EXTENDED RELEASE ORAL DAILY
Status: DISCONTINUED | OUTPATIENT
Start: 2024-08-03 | End: 2024-08-05

## 2024-08-03 RX ORDER — ALPRAZOLAM 0.25 MG/1
0.25 TABLET ORAL 3 TIMES DAILY PRN
Status: DISCONTINUED | OUTPATIENT
Start: 2024-08-03 | End: 2024-08-05

## 2024-08-03 NOTE — H&P
[facility]     Danuta Baca Patient Status:  Emergency    5/10/1955 MRN [id number]   Location [facility] EMERGENCY DEPARTMENT Attending Ubaldo Barrera MD   Hosp Day # 0 PCP Teressa Pickard MD     Chief Complaint: palpitations    Subjective:    History of Present Illness:     Danuta Baca is a 69 year old female with  pmhx of SVT, HTN, HLD who presents with complaint of recurrent palpitations and lightheadedness/dizziness. She was just admitted  and discharged earlier today for recurrent SVT which improved/resolved after adenosine administration. Her echo was without concerning pathology and she was discharged to home on higher dose diltiazem. She was initially feeling well after discharge, but a few hours after returning home she developed recurrent palpitations and associated lightheadedness/dizziness. She denies any chest pain/pressure, SOB/PALMER.      History/Other:    Past Medical History:  Past Medical History:    Anticardiolipin antibody positive    Antiphospholipid antibody positive    Arrhythmia    Broken toe    little toe left foot    Dry eyes    Essential hypertension    Gastroesophageal reflux disease with esophagitis    High cholesterol    HTN (hypertension)    Hx of adenomatous colonic polyps    Hypercoagulable state (HCC)    Iridocyclitis    Left eye pain    Left shoulder pain     (normal spontaneous vaginal delivery) (HCC)    x2    Palpitations    Normal echo, stress and holter     Retinal holes    OS    Retinal vein occlusion (HCC)    SVT (supraventricular tachycardia) (HCC)    Varicose veins     Past Surgical History:   Past Surgical History:   Procedure Laterality Date    Cataract      Colonoscopy  2019    Egd N/A 2019    Procedure: ESOPHAGOGASTRODUODENOSCOPY, COLONOSCOPY, POSSIBLE BIOPSY, POSSIBLE POLYPECTOMY 28646, 95305;  Surgeon: Dave Sagastume MD;  [facility] [facility]      Family History:   Family History    Problem Relation Age of Onset    Other (Alzheimer's) Father     Other (htn) Father     Glaucoma Mother     Other (skin cancer) Mother     Heart Attack Maternal Grandfather     Thyroid Disorder Sister     Other (severe hypertension) Brother     Arrhythmia Other      Social History:    reports that she has never smoked. She has never used smokeless tobacco. She reports current alcohol use. She reports that she does not use drugs.     Allergies:   Allergies   Allergen Reactions    Amoxicillin HIVES    Amoxil     Cefaclor UNKNOWN    Ciprofloxacin     Pcns [Penicillins]        Medications:    Current Facility-Administered Medications on File Prior to Encounter   Medication Dose Route Frequency Provider Last Rate Last Admin    [COMPLETED] adenosine (Adenocard) 6 mg/2mL injection 6 mg  6 mg Intravenous Once Ap Grajeda MD   6 mg at 08/01/24 1610    [COMPLETED] potassium chloride (Klor-Con M20) tab 40 mEq  40 mEq Oral Once Ap Grajeda MD   40 mEq at 08/01/24 1707    [COMPLETED] dilTIAZem (cardIZEM) 25 mg/5mL injection 10 mg  10 mg Intravenous Once Ap Grajeda MD   10 mg at 08/01/24 1917     Current Outpatient Medications on File Prior to Encounter   Medication Sig Dispense Refill    dilTIAZem ER (CARTIA XT) 240 MG Oral Capsule SR 24 Hr Take 1 capsule (240 mg total) by mouth daily. 90 capsule 3    losartan-hydroCHLOROthiazide 50-12.5 MG Oral Tab Take 1 tablet by mouth daily.      Multiple Vitamins-Minerals (ONE A DAY WOMEN 50 PLUS OR) Take 1 tablet by mouth daily.      NON FORMULARY Take 1 capsule by mouth every evening. [employer] EZ Tears      NON FORMULARY Take 1 capsule by mouth every morning. [employer] Restore      Calcium Carbonate-Vitamin D (CALCIUM-D OR) Take 1 tablet by mouth daily.      valACYclovir 1 G Oral Tab Take 1 tablet (1,000 mg total) by mouth daily. 90 tablet 3    Fluocinolone Acetonide 0.01 % Otic Oil 1 drop by Each ear route once a week. As needed  4       Review of Systems:   A  comprehensive review of systems was completed.    Pertinent positives and negatives noted in the HPI.    Objective:   Physical Exam:    /90   Pulse 101   Temp 98.2 °F (36.8 °C) (Temporal)   Resp 14   Ht 5' 7\" (1.702 m)   Wt 180 lb (81.6 kg)   SpO2 96%   BMI 28.19 kg/m²   General: No acute distress, Alert  Respiratory: No rhonchi, no wheezes  Cardiovascular: S1, S2. Tachycardia to 120's-130's  Abdomen: Soft, Non-tender, non-distended, positive bowel sounds  Neuro: No new focal deficits  Extremities: No edema      Results:    Labs:      Labs Last 24 Hours:    Recent Labs   Lab 08/01/24  1544 08/02/24  0451   RBC 4.63 4.17   HGB 14.1 12.7   HCT 39.7 36.5   MCV 85.7 87.5   MCH 30.5 30.5   MCHC 35.5 34.8   RDW 12.7 12.9   NEPRELIM 5.93 3.57   WBC 10.4 6.7   .0 178.0       Recent Labs   Lab 08/01/24  1544 08/02/24  0451   GLU 95 82   BUN 17 12   CREATSERUM 0.79 0.64   EGFRCR 82 97   CA 9.8 9.2   ALB 4.5  --     141   K 3.2* 3.5    108   CO2 28.0 28.0   ALKPHO 76  --    AST 26  --    ALT 21  --    BILT 0.5  --    TP 7.2  --        Lab Results   Component Value Date    INR 0.94 08/01/2024       Recent Labs   Lab 08/02/24  0100 08/02/24  0248 08/02/24  0451   TROPHS 8 7 7       No results for input(s): \"TROP\", \"PBNP\" in the last 168 hours.    No results for input(s): \"PCT\" in the last 168 hours.    Imaging: Imaging data reviewed in Epic.    Assessment & Plan:      #Palpitations, lightheadedness   #recurrent SVT  - diltiazem drip ordered  - telemetry  - replace electrolytes   - echo reviewed  - may need EP consult  - cardiology consulted from ED    #HTN  - BP's lower this admission  - hold PTA lisinopril-hydrochlorothiazide    #Hx of antiphospholipid antibody syndrome         Plan of care discussed with pt, ED    Kim Richardson, DO    Supplementary Documentation:     The 21st Century Cures Act makes medical notes like these available to patients in the interest of transparency. Please be  advised this is a medical document. Medical documents are intended to carry relevant information, facts as evident, and the clinical opinion of the practitioner. The medical note is intended as peer to peer communication and may appear blunt or direct. It is written in medical language and may contain abbreviations or verbiage that are unfamiliar.

## 2024-08-03 NOTE — ED PROVIDER NOTES
Patient Seen in: [facility] Emergency Department      History     Chief Complaint   Patient presents with    Arrythmia/Palpitations     Stated Complaint: felt like heart was racing, d/c today with svt    Subjective:   HPI    Patient comes to the emergency department with tachycardia and palpitations.  The patient states that she was recently hospitalized and in fact just discharged earlier today for her tachycardia.  The patient had control of her arrhythmia with Cardizem during her hospitalization, but soon after returning home had recurrence of her tachycardia.  She had no chest pain.  She had no syncope or near syncope.    Objective:   Past Medical History:    Anticardiolipin antibody positive    Antiphospholipid antibody positive    Arrhythmia    Broken toe    little toe left foot    Dry eyes    Essential hypertension    Gastroesophageal reflux disease with esophagitis    High cholesterol    HTN (hypertension)    Hx of adenomatous colonic polyps    Hypercoagulable state (HCC)    Iridocyclitis    Left eye pain    Left shoulder pain     (normal spontaneous vaginal delivery) (HCC)    x2    Palpitations    Normal echo, stress and holter     Retinal holes    OS    Retinal vein occlusion (HCC)    SVT (supraventricular tachycardia) (HCC)    Varicose veins              Past Surgical History:   Procedure Laterality Date    Cataract      Colonoscopy  2019    Egd N/A 2019    Procedure: ESOPHAGOGASTRODUODENOSCOPY, COLONOSCOPY, POSSIBLE BIOPSY, POSSIBLE POLYPECTOMY 41344, 39223;  Surgeon: Dave Sagastume MD;  Location: [facility], [facility]                Social History     Socioeconomic History    Marital status:    Tobacco Use    Smoking status: Never    Smokeless tobacco: Never   Vaping Use    Vaping status: Never Used   Substance and Sexual Activity    Alcohol use: Yes     Alcohol/week: 0.0 standard drinks of alcohol     Comment: On a weekend once in awhile    Drug use: No   Other  Topics Concern    Caffeine Concern Yes     Comment: coffee     Stress Concern No    Weight Concern No    Special Diet Yes     Comment: No white flour    Exercise Yes     Comment: walk- 30 min daily     Seat Belt Yes     Social Determinants of Health     Food Insecurity: No Food Insecurity (8/2/2024)    Food Insecurity     Food Insecurity: Never true   Transportation Needs: No Transportation Needs (8/2/2024)    Transportation Needs     Lack of Transportation: No   Housing Stability: Low Risk  (8/2/2024)    Housing Stability     Housing Instability: No              Review of Systems    Positive for stated Chief Complaint: Arrythmia/Palpitations    Other systems are as noted in HPI.  Constitutional and vital signs reviewed.      All other systems reviewed and negative except as noted above.    Physical Exam     ED Triage Vitals [08/02/24 2041]   BP (!) 150/102   Pulse (!) 123   Resp 18   Temp 98.2 °F (36.8 °C)   Temp src Temporal   SpO2 96 %   O2 Device None (Room air)       Current Vitals:   Vital Signs  BP: (!) 147/93  Pulse: 104  Resp: 17  Temp: 98.2 °F (36.8 °C)  Temp src: Temporal  MAP (mmHg): (!) 106    Oxygen Therapy  SpO2: 96 %  O2 Device: None (Room air)            Physical Exam  Vitals and nursing note reviewed.   Constitutional:       Appearance: She is well-developed.   HENT:      Head: Normocephalic.   Cardiovascular:      Rate and Rhythm: Regular rhythm. Tachycardia present.      Heart sounds: Normal heart sounds. No murmur heard.  Pulmonary:      Effort: Pulmonary effort is normal. No respiratory distress.      Breath sounds: Normal breath sounds.   Abdominal:      General: Bowel sounds are normal.      Palpations: Abdomen is soft.      Tenderness: There is no abdominal tenderness. There is no rebound.   Musculoskeletal:         General: No tenderness. Normal range of motion.      Cervical back: Normal range of motion and neck supple.   Lymphadenopathy:      Cervical: No cervical adenopathy.   Skin:      General: Skin is warm and dry.      Findings: No rash.   Neurological:      Mental Status: She is alert and oriented to person, place, and time.      Sensory: No sensory deficit.              ED Course     Labs Reviewed   COMP METABOLIC PANEL (14)   CBC WITH DIFFERENTIAL WITH PLATELET   RAINBOW DRAW LAVENDER   RAINBOW DRAW LIGHT GREEN   RAINBOW DRAW BLUE   RAINBOW DRAW GOLD     EKG    Rate, intervals and axes as noted on EKG Report.  Rate: 123  Rhythm: Accelerated junctional versus supraventricular tachycardia  Reading: No acute ST or T wave abnormalities when compared to previous EKG.  No evidence of ischemia.                 After consulting with cardiology, they recommended foregoing adenosine as it did not seem to help the patient during her previous hospitalization.  They recommended IV Cardizem.  Cardizem bolus and drip were initiated in the emergency department with some moderation of her heart rate to the low 100s.  At no point did patient develop any further deterioration or hemodynamic compromise.  Pattern of rhythm did not suggest atrial fibrillation.    Repeat EKG was performed after Cardizem was initiated.    EKG    Rate, intervals and axes as noted on EKG Report.  Rate: 96  Rhythm: Sinus Rhythm  Reading: Resolution of patient's supraventricular arrhythmia.  No ischemic appearing ST or T wave abnormalities noted.        A total of 35 minutes of critical care time (exclusive of billable procedures) was administered to manage the patient's unstable vital signs and cardiovascular instability due to her tachycardia.  This involved direct patient intervention, complex decision making, and/or extensive discussions with the patient, family, and clinical staff.           MDM      Patient comes to the emergency department with acute palpitations and tachycardia consistent with previous episodes of tachycardia.  At this time, it is unclear what is the exact etiology of the patient's tachycardia.  Patient's rate  does seem to be responsive to IV Cardizem and patient was admitted to cardiac telemetry under the care of hospitalist and cardiology services.  Admission disposition: 8/2/2024  9:21 PM                                        Medical Decision Making      Disposition and Plan     Clinical Impression:  1. Junctional tachycardia (HCC)         Disposition:  Admit  8/2/2024  9:21 pm    Follow-up:  No follow-up provider specified.        Medications Prescribed:  Current Discharge Medication List                            Hospital Problems       Present on Admission  Date Reviewed: 6/28/2024            ICD-10-CM Noted POA    * (Principal) Junctional tachycardia (HCC) I47.19 8/2/2024 Unknown

## 2024-08-03 NOTE — ED QUICK NOTES
Orders for admission, patient is aware of plan and ready to go upstairs. Any questions, please call ED RN asher at extension [phone].     Patient Covid vaccination status: Fully vaccinated     COVID Test Ordered in ED: None    COVID Suspicion at Admission: N/A    Running Infusions:    dilTIAZem 10 mg/hr (08/02/24 2121)        Mental Status/LOC at time of transport: a/ox3    Other pertinent information:   CIWA score: N/A   NIH score:  N/A

## 2024-08-03 NOTE — ED INITIAL ASSESSMENT (HPI)
Patient arrives form home with [place] [facility] for evaluation of \"heart racing\", per patient she was d/c earlier today for svt and was supposed to double up on her diltiazem tomorrow to 240mg. Patient reports she feels better at this time but still feels some heart racing. Patient reports she was taking a nap and woke up in a rush d/t it being late and her needing to p/u her rx and that when she started feeling like her heart was racing. Patient a/ox4.

## 2024-08-03 NOTE — CONSULTS
[place] Report of Consultation      Danuta Baca Patient Status:  Inpatient    5/10/1955 MRN [id number]   Location [facility] 2NE-A Attending Joey Wooten MD   Hosp Day # 1 PCP Teressa Pickard MD     Reason for Consultation   PSVT    Assessment/Plan   SVT versus accelerated junctional, appears to be a slower AV wade reentrant mechanism.  Normal LV function.  Hypertension.    Continue Cardizem.  Add beta-blocker low-dose if needed.  Consider EP study and ablation as outpatient.    LEVEL 5  History of Present Illness:   Danuta Baca is a a(n) 69 year old female, presented yesterday with symptoms of palpitations, noted to be in an SVT rhythm, rates in the 120s with retrograde P waves.  Readmitted after being discharged with similar symptoms.  Denies dizziness or syncope.    History:  Past Medical History:    Anticardiolipin antibody positive    Antiphospholipid antibody positive    Arrhythmia    Broken toe    little toe left foot    Dry eyes    Essential hypertension    Gastroesophageal reflux disease with esophagitis    High cholesterol    HTN (hypertension)    Hx of adenomatous colonic polyps    Hypercoagulable state (HCC)    Iridocyclitis    Left eye pain    Left shoulder pain     (normal spontaneous vaginal delivery) (HCC)    x2    Palpitations    Normal echo, stress and holter     Retinal holes    OS    Retinal vein occlusion (HCC)    SVT (supraventricular tachycardia) (HCC)    Varicose veins     Past Surgical History:   Procedure Laterality Date    Cataract      Colonoscopy  2019    Egd N/A 2019    Procedure: ESOPHAGOGASTRODUODENOSCOPY, COLONOSCOPY, POSSIBLE BIOPSY, POSSIBLE POLYPECTOMY 67186, [id number];  Surgeon: Dave Sagastume MD;  Location: [facility], [facility]     Family History   Problem Relation Age of Onset    Other (Alzheimer's) Father     Other (htn) Father     Glaucoma Mother     Other (skin cancer) Mother     Heart Attack Maternal Grandfather      Thyroid Disorder Sister     Other (severe hypertension) Brother     Arrhythmia Other       reports that she has never smoked. She has never used smokeless tobacco. She reports current alcohol use. She reports that she does not use drugs.    Allergies:  Allergies   Allergen Reactions    Amoxicillin HIVES    Amoxil     Cefaclor UNKNOWN    Ciprofloxacin     Pcns [Penicillins]        Medications:    Current Facility-Administered Medications:     magnesium oxide (Mag-Ox) tab 400 mg, 400 mg, Oral, Once    dilTIAZem ER (CardIZEM CD) 24 hr cap 240 mg, 240 mg, Oral, Daily    losartan (Cozaar) 50 mg, hydroCHLOROthiazide 12.5 mg for Hyzaar 50/12.5 ([facility] only), , Oral, Daily    [COMPLETED] dilTIAZem 5 mg BOLUS FROM BAG infusion, 5 mg, Intravenous, Once **AND** dilTIAZem (cardIZEM) 100 mg in sodium chloride 0.9% 100 mL IVPB-ADDV, 2.5-20 mg/hr, Intravenous, Continuous    melatonin tab 3 mg, 3 mg, Oral, Nightly PRN    ondansetron (Zofran) 4 MG/2ML injection 4 mg, 4 mg, Intravenous, Q6H PRN    enoxaparin (Lovenox) 40 MG/0.4ML SUBQ injection 40 mg, 40 mg, Subcutaneous, Daily    acetaminophen (Tylenol Extra Strength) tab 500 mg, 500 mg, Oral, Q4H PRN    polyethylene glycol (PEG 3350) (Miralax) 17 g oral packet 17 g, 17 g, Oral, Daily PRN    sennosides (Senokot) tab 17.2 mg, 17.2 mg, Oral, Nightly PRN    bisacodyl (Dulcolax) 10 MG rectal suppository 10 mg, 10 mg, Rectal, Daily PRN    fleet enema (Fleet) 7-19 GM/118ML rectal enema 133 mL, 1 enema, Rectal, Once PRN    Review of Systems:  Denies abdominal pain, N/V. No neurologic sx such as focal weakness or paresthesias. No cough. No visual disturbance. No fevers, chills.    Physical Exam:  Blood pressure 137/84, pulse 86, temperature 97.7 °F (36.5 °C), temperature source Oral, resp. rate 13, height 67\", weight 173 lb 11.6 oz (78.8 kg), SpO2 97%, not currently breastfeeding.  Temp (24hrs), Av °F (36.7 °C), Min:97.7 °F (36.5 °C), Max:98.4 °F (36.9 °C)    Wt Readings from Last 3  Encounters:   08/02/24 173 lb 11.6 oz (78.8 kg)   08/01/24 177 lb 4 oz (80.4 kg)   05/16/23 186 lb 6.4 oz (84.6 kg)       Telemetry: SR  General: Alert and oriented in no apparent distress.  HEENT: No focal deficits.  Neck: No JVD, carotids 2+ no bruits.  Cardiac: Regular rate and rhythm, S1, S2 normal, no murmur, rub or gallop.  Lungs: Clear without wheezes, rales, rhonchi or dullness.  Normal excursions and effort.  Abdomen: Soft, non-tender.   Extremities: Without clubbing, cyanosis or edema.  Peripheral pulses are 2+.  Neurologic: Alert and oriented, normal affect.  Skin: Warm and dry.     Laboratories and Data:  Lab Results   Component Value Date    WBC 6.5 08/03/2024    RBC 4.44 08/03/2024    HGB 13.6 08/03/2024    HCT 39.0 08/03/2024    MCV 87.8 08/03/2024    MCH 30.6 08/03/2024    MCHC 34.9 08/03/2024    RDW 13.0 08/03/2024    .0 08/03/2024     BUN (mg/dL)   Date Value   08/03/2024 12   08/02/2024 21   08/02/2024 12     UREA NITROGEN (BUN) (mg/dL)   Date Value   09/05/2020 22   10/24/2015 18   11/07/2014 16     CREATININE (mg/dL)   Date Value   09/05/2020 0.79   04/17/2019 0.79   10/24/2015 0.87   11/07/2014 0.79     Creatinine (mg/dL)   Date Value   08/03/2024 0.61   08/02/2024 0.91   08/02/2024 0.64     Lab Results   Component Value Date    INR 0.94 08/01/2024       Patient Active Problem List   Diagnosis    CRVO (central retinal vein occlusion) ([facility])    HTN (hypertension)    Herpes simplex infection of genitourinary system    High cholesterol    Age-related nuclear cataract of both eyes    Posterior vitreous detachment of both eyes    PCO (posterior capsular opacification), bilateral    Gastro-esophageal reflux disease without esophagitis    Osteopenia    SVT (supraventricular tachycardia) ([facility])    PAC (premature atrial contraction)    Poorly-controlled hypertension    Junctional tachycardia (HCC)         Hiren Flores MD  8/3/2024  11:01 AM

## 2024-08-03 NOTE — PROGRESS NOTES
[facility]   part of [facility]     Hospitalist Progress Note     Danuta Baca Patient Status:  Inpatient    5/10/1955 MRN [id number]   Location [facility] 2NE-A Attending Joey Wooten MD   Hosp Day # 1 PCP Teressa Pickard MD     Chief Complaint: chest tightness    Subjective:     Patient pt c/o chest tightness. No palpitations, sob. Does have a cough since this AM, productive  w/ sore throat    Objective:    Review of Systems:   A comprehensive review of systems was completed; pertinent positive and negatives stated in subjective.    Vital signs:  Temp:  [97.4 °F (36.3 °C)-98.2 °F (36.8 °C)] 97.4 °F (36.3 °C)  Pulse:  [] 91  Resp:  [12-26] 13  BP: (123-153)/() 151/83  SpO2:  [95 %-98 %] 95 %    Physical Exam:    General: No acute distress  Respiratory: No wheezes, no rhonchi  Cardiovascular: S1, S2, regular rate and rhythm  Abdomen: Soft, Non-tender, non-distended, positive bowel sounds  Neuro: No new focal deficits.   Extremities: No edema      Diagnostic Data:    Labs:  Recent Labs   Lab 24  1544 24  0451 24  0641   WBC 10.4 6.7 8.0 6.5   HGB 14.1 12.7 13.7 13.6   MCV 85.7 87.5 89.1 87.8   .0 178.0 192.0 182.0   INR 0.94  --   --   --        Recent Labs   Lab 24  1544 24  0451 24  0641   GLU 95 82 126* 88   BUN 17 12 21 12   CREATSERUM 0.79 0.64 0.91 0.61   CA 9.8 9.2 9.2 9.4   ALB 4.5  --  4.2  --     141 139 140   K 3.2* 3.5 3.2* 3.9  3.9    108 107 108   CO2 28.0 28.0 25.0 26.0   ALKPHO 76  --  59  --    AST 26  --  26  --    ALT 21  --  23  --    BILT 0.5  --  0.5  --    TP 7.2  --  6.6  --        Estimated Creatinine Clearance: 87 mL/min (based on SCr of 0.61 mg/dL).    Recent Labs   Lab 24  0100 24  0248 24  0451   TROPHS 8 7 7       Recent Labs   Lab 24  1544   PTP 12.6   INR 0.94                  Microbiology    No results found for this visit on  08/02/24.      Imaging: Reviewed in Epic.    Medications:    dilTIAZem ER  240 mg Oral Daily    losartan (Cozaar) 50 mg, hydroCHLOROthiazide 12.5 mg for Hyzaar 50/12.5 ([facility] only)   Oral Daily    enoxaparin  40 mg Subcutaneous Daily       Assessment & Plan:      #Palpitations, lightheadedness   #recurrent SVT  - telemetry  - echo unremarkable  - diltiazem increased to 240mg. Wean off dilt gtt  - cardiology consulted  - no SVT noted this admission,  has chest tightness, consider stress test Monday. Possibly anxiety related?  -  trops neg  - cxr, ddimer. Will review indep.     #sore throat, cough  -cxr  -declines cepacol  -check RVP    #anxiety  -untreated  -trial xanax    #HTN  - arb-hydrochlorothiazide, diltiazem     #Hx of antiphospholipid antibody syndrome       Joey Wooten MD    Supplementary Documentation:     Quality:  DVT Mechanical Prophylaxis:   SCDs,    DVT Pharmacologic Prophylaxis   Medication    enoxaparin (Lovenox) 40 MG/0.4ML SUBQ injection 40 mg                Code Status: Not on file  Horn: No urinary catheter in place  Horn Duration (in days):   Central line:    BRAEDEN: 8/3/2024    Discharge is dependent on: course  At this point Ms. Baca is expected to be discharge to: home    The 21st Century Cures Act makes medical notes like these available to patients in the interest of transparency. Please be advised this is a medical document. Medical documents are intended to carry relevant information, facts as evident, and the clinical opinion of the practitioner. The medical note is intended as peer to peer communication and may appear blunt or direct. It is written in medical language and may contain abbreviations or verbiage that are unfamiliar.             **Certification      PHYSICIAN Certification of Need for Inpatient Hospitalization - Initial Certification    Patient will require inpatient services that will reasonably be expected to span two midnight's based on the clinical documentation in  H+P.   Based on patients current state of illness, I anticipate that, after discharge, patient will require TBD.

## 2024-08-03 NOTE — PROGRESS NOTES
NURSING ADMISSION NOTE      Patient admitted via Cart  Oriented to room.  Safety precautions initiated.  Bed in low position.  Call light in reach.    Assumed care for this patient at 2230. Admission navigator/PTA med list complete. Patient alert and oriented x4. NSR on tele. Cardizem gtt. Maintaining o2 sats on RA. Denies SOB. Continent of bowel/bladder. No c/o pain. Denies lightheadedness/dizziness at this time. IVF. Updated patient and patient's son at bedside on POC, verbalized understanding. Safety precautions put in place.

## 2024-08-03 NOTE — PLAN OF CARE
Patient is admitted for heart palpation. Droplet precaution for r/o flu. CXR done . Pt is AOX4.  VSS, afebrile and denies any pain. SpO2 maintained on RA. . Denies any SOB, cough. Pt feels throat is dry . Tele-NSR. ST with ambulation, HR up to 116, pt felt chest tightness after ambulation. Notified [facility]. Lovenox. Reg diet. Denies any n/v/d. Voids. Up with SBA.  IV fluid Dc-ed.  IV Cardizem gtt D-ed. PO Cardizem.  Will continue to monitor.  Pt is updated with plan of care.        Problem: Patient/Family Goals  Goal: Patient/Family Long Term Goal  Description: Patient's Long Term Goal:   DC  home   Interventions:  - follow plan of care         - See additional Care Plan goals for specific interventions  Outcome: Progressing  Goal: Patient/Family Short Term Goal  Description: Patient's Short Term Goal: control BP and HR    Interventions:   - Cardizem gtt       - See additional Care Plan goals for specific interventions  Outcome: Progressing     Problem: SAFETY ADULT - FALL  Goal: Free from fall injury  Description: INTERVENTIONS:  - Assess pt frequently for physical needs  - Identify cognitive and physical deficits and behaviors that affect risk of falls.  - [place] fall precautions as indicated by assessment.  - Educate pt/family on patient safety including physical limitations  - Instruct pt to call for assistance with activity based on assessment  - Modify environment to reduce risk of injury  - Provide assistive devices as appropriate  - Consider OT/PT consult to assist with strengthening/mobility  - Encourage toileting schedule  Outcome: Progressing     Problem: DISCHARGE PLANNING  Goal: Discharge to home or other facility with appropriate resources  Description: INTERVENTIONS:  - Identify barriers to discharge w/pt and caregiver  - Include patient/family/discharge partner in discharge planning  - Arrange for needed discharge resources and transportation as appropriate  - Identify discharge learning needs  (meds, wound care, etc)  - Arrange for interpreters to assist at discharge as needed  - Consider post-discharge preferences of patient/family/discharge partner  - Complete POLST form as appropriate  - Assess patient's ability to be responsible for managing their own health  - Refer to Case Management Department for coordinating discharge planning if the patient needs post-hospital services based on physician/LIP order or complex needs related to functional status, cognitive ability or social support system  Outcome: Progressing     Problem: CARDIOVASCULAR - ADULT  Goal: Maintains optimal cardiac output and hemodynamic stability  Description: INTERVENTIONS:  - Monitor vital signs, rhythm, and trends  - Monitor for bleeding, hypotension and signs of decreased cardiac output  - Evaluate effectiveness of vasoactive medications to optimize hemodynamic stability  - Monitor arterial and/or venous puncture sites for bleeding and/or hematoma  - Assess quality of pulses, skin color and temperature  - Assess for signs of decreased coronary artery perfusion - ex. Angina  - Evaluate fluid balance, assess for edema, trend weights  Outcome: Progressing  Goal: Absence of cardiac arrhythmias or at baseline  Description: INTERVENTIONS:  - Continuous cardiac monitoring, monitor vital signs, obtain 12 lead EKG if indicated  - Evaluate effectiveness of antiarrhythmic and heart rate control medications as ordered  - Initiate emergency measures for life threatening arrhythmias  - Monitor electrolytes and administer replacement therapy as ordered  Outcome: Progressing

## 2024-08-03 NOTE — SPIRITUAL CARE NOTE
Spiritual Care Visit Note    Patient Name: Danuta Baca Date of Spiritual Care Visit: 24   : 5/10/1955 Primary Dx: Junctional tachycardia (HCC)       Referred By:      Spiritual Care Taxonomy:         Methods: Collaborate with care team member         Visit Type/Summary:     - Spiritual Care: Responded to a request for spiritual care and assessed the patient for spiritual care needs. Consulted with RN prior to visit. Patient declined a  visit at this time.  remains available as needed for follow up.    Spiritual Care support can be requested via an Epic consult. For urgent/immediate needs, please contact the On Call  at: [place]: ext [phone]

## 2024-08-04 ENCOUNTER — TELEPHONE (OUTPATIENT)
Dept: INTERNAL MEDICINE CLINIC | Facility: CLINIC | Age: 69
End: 2024-08-04

## 2024-08-04 LAB — MAGNESIUM SERPL-MCNC: 1.9 MG/DL (ref 1.6–2.6)

## 2024-08-04 PROCEDURE — 99232 SBSQ HOSP IP/OBS MODERATE 35: CPT | Performed by: HOSPITALIST

## 2024-08-04 RX ORDER — VALACYCLOVIR HYDROCHLORIDE 500 MG/1
1000 TABLET, FILM COATED ORAL DAILY
Status: DISCONTINUED | OUTPATIENT
Start: 2024-08-04 | End: 2024-08-05

## 2024-08-04 NOTE — PLAN OF CARE
Assumed care for this patient at 1930. Patient alert and oriented x4. Up independently. NSR on tele. Maintaining o2 sats on RA. Needing 2L NC while sleeping. Denies SOB. Continent of bowel/bladder. No c/o pain. Reviewed POC with patient and patient's daughter at bedside, verbalized understanding.     Problem: Patient/Family Goals  Goal: Patient/Family Long Term Goal  Description: Patient's Long Term Goal:   DC  home   Interventions:  - follow plan of care         - See additional Care Plan goals for specific interventions  Outcome: Progressing  Goal: Patient/Family Short Term Goal  Description: Patient's Short Term Goal: control BP and HR    Interventions:   - Cardizem gtt       - See additional Care Plan goals for specific interventions  Outcome: Progressing     Problem: SAFETY ADULT - FALL  Goal: Free from fall injury  Description: INTERVENTIONS:  - Assess pt frequently for physical needs  - Identify cognitive and physical deficits and behaviors that affect risk of falls.  - [place] fall precautions as indicated by assessment.  - Educate pt/family on patient safety including physical limitations  - Instruct pt to call for assistance with activity based on assessment  - Modify environment to reduce risk of injury  - Provide assistive devices as appropriate  - Consider OT/PT consult to assist with strengthening/mobility  - Encourage toileting schedule  Outcome: Progressing     Problem: DISCHARGE PLANNING  Goal: Discharge to home or other facility with appropriate resources  Description: INTERVENTIONS:  - Identify barriers to discharge w/pt and caregiver  - Include patient/family/discharge partner in discharge planning  - Arrange for needed discharge resources and transportation as appropriate  - Identify discharge learning needs (meds, wound care, etc)  - Arrange for interpreters to assist at discharge as needed  - Consider post-discharge preferences of patient/family/discharge partner  - Complete POLST form as  appropriate  - Assess patient's ability to be responsible for managing their own health  - Refer to Case Management Department for coordinating discharge planning if the patient needs post-hospital services based on physician/LIP order or complex needs related to functional status, cognitive ability or social support system  Outcome: Progressing     Problem: CARDIOVASCULAR - ADULT  Goal: Maintains optimal cardiac output and hemodynamic stability  Description: INTERVENTIONS:  - Monitor vital signs, rhythm, and trends  - Monitor for bleeding, hypotension and signs of decreased cardiac output  - Evaluate effectiveness of vasoactive medications to optimize hemodynamic stability  - Monitor arterial and/or venous puncture sites for bleeding and/or hematoma  - Assess quality of pulses, skin color and temperature  - Assess for signs of decreased coronary artery perfusion - ex. Angina  - Evaluate fluid balance, assess for edema, trend weights  Outcome: Progressing  Goal: Absence of cardiac arrhythmias or at baseline  Description: INTERVENTIONS:  - Continuous cardiac monitoring, monitor vital signs, obtain 12 lead EKG if indicated  - Evaluate effectiveness of antiarrhythmic and heart rate control medications as ordered  - Initiate emergency measures for life threatening arrhythmias  - Monitor electrolytes and administer replacement therapy as ordered  Outcome: Progressing

## 2024-08-04 NOTE — PLAN OF CARE
Patient is admitted for heart palpation. Pt is AOX4.  VSS, afebrile and denies any pain. SpO2 maintained on RA. . Denies any SOB, cough. Pt feels throat is dry . Tele-NSR.  Lovenox. Pt ambulated in the hallway. Pt had a brief moment mild chest tightness during ambulation.  Reg diet. Denies any n/v/d. Voids. Up with SBA.   PO Cardizem. Stress test tomorrow.  Will continue to monitor.  Pt is updated with plan of care.         Problem: Patient/Family Goals  Goal: Patient/Family Long Term Goal  Description: Patient's Long Term Goal:   DC  home   Interventions:  - follow plan of care         - See additional Care Plan goals for specific interventions  Outcome: Progressing  Goal: Patient/Family Short Term Goal  Description: Patient's Short Term Goal: control BP and HR    Interventions:   - Cardizem gtt       - See additional Care Plan goals for specific interventions  Outcome: Progressing     Problem: SAFETY ADULT - FALL  Goal: Free from fall injury  Description: INTERVENTIONS:  - Assess pt frequently for physical needs  - Identify cognitive and physical deficits and behaviors that affect risk of falls.  - [place] fall precautions as indicated by assessment.  - Educate pt/family on patient safety including physical limitations  - Instruct pt to call for assistance with activity based on assessment  - Modify environment to reduce risk of injury  - Provide assistive devices as appropriate  - Consider OT/PT consult to assist with strengthening/mobility  - Encourage toileting schedule  Outcome: Progressing     Problem: DISCHARGE PLANNING  Goal: Discharge to home or other facility with appropriate resources  Description: INTERVENTIONS:  - Identify barriers to discharge w/pt and caregiver  - Include patient/family/discharge partner in discharge planning  - Arrange for needed discharge resources and transportation as appropriate  - Identify discharge learning needs (meds, wound care, etc)  - Arrange for interpreters to  assist at discharge as needed  - Consider post-discharge preferences of patient/family/discharge partner  - Complete POLST form as appropriate  - Assess patient's ability to be responsible for managing their own health  - Refer to Case Management Department for coordinating discharge planning if the patient needs post-hospital services based on physician/LIP order or complex needs related to functional status, cognitive ability or social support system  Outcome: Progressing     Problem: CARDIOVASCULAR - ADULT  Goal: Maintains optimal cardiac output and hemodynamic stability  Description: INTERVENTIONS:  - Monitor vital signs, rhythm, and trends  - Monitor for bleeding, hypotension and signs of decreased cardiac output  - Evaluate effectiveness of vasoactive medications to optimize hemodynamic stability  - Monitor arterial and/or venous puncture sites for bleeding and/or hematoma  - Assess quality of pulses, skin color and temperature  - Assess for signs of decreased coronary artery perfusion - ex. Angina  - Evaluate fluid balance, assess for edema, trend weights  Outcome: Progressing  Goal: Absence of cardiac arrhythmias or at baseline  Description: INTERVENTIONS:  - Continuous cardiac monitoring, monitor vital signs, obtain 12 lead EKG if indicated  - Evaluate effectiveness of antiarrhythmic and heart rate control medications as ordered  - Initiate emergency measures for life threatening arrhythmias  - Monitor electrolytes and administer replacement therapy as ordered  Outcome: Progressing

## 2024-08-04 NOTE — PROGRESS NOTES
[facility]   part of [facility]     Hospitalist Progress Note     Danuta Baca Patient Status:  Inpatient    5/10/1955 MRN [id number]   Location [facility] [place] 2NE-A Attending Joey Wooten MD   Hosp Day # 2 PCP Teressa Pickard MD     Chief Complaint: chest tightness    Subjective:     No CP today     Objective:    Review of Systems:   A comprehensive review of systems was completed; pertinent positive and negatives stated in subjective.    Vital signs:  Temp:  [97.7 °F (36.5 °C)-98.2 °F (36.8 °C)] 98.2 °F (36.8 °C)  Pulse:  [77-92] 81  Resp:  [12-16] 16  BP: (132-146)/(73-86) 146/73  SpO2:  [87 %-99 %] 93 %    Physical Exam:    General: No acute distress  Respiratory: No wheezes, no rhonchi  Cardiovascular: S1, S2, regular rate and rhythm  Abdomen: Soft, Non-tender, non-distended, positive bowel sounds  Neuro: No new focal deficits.   Extremities: No edema      Diagnostic Data:    Labs:  Recent Labs   Lab 24  1544 24  0451 24  0641   WBC 10.4 6.7 8.0 6.5   HGB 14.1 12.7 13.7 13.6   MCV 85.7 87.5 89.1 87.8   .0 178.0 192.0 182.0   INR 0.94  --   --   --        Recent Labs   Lab 24  1544 24  0451 24  0641   GLU 95 82 126* 88   BUN 17 12 21 12   CREATSERUM 0.79 0.64 0.91 0.61   CA 9.8 9.2 9.2 9.4   ALB 4.5  --  4.2  --     141 139 140   K 3.2* 3.5 3.2* 3.9  3.9    108 107 108   CO2 28.0 28.0 25.0 26.0   ALKPHO 76  --  59  --    AST 26  --  26  --    ALT 21  --  23  --    BILT 0.5  --  0.5  --    TP 7.2  --  6.6  --        Estimated Creatinine Clearance: 87 mL/min (based on SCr of 0.61 mg/dL).    Recent Labs   Lab 24  0100 24  0248 24  0451   TROPHS 8 7 7       Recent Labs   Lab 24  1544   PTP 12.6   INR 0.94                  Microbiology    No results found for this visit on 24.      Imaging: Reviewed in Epic.    Medications:    valACYclovir  1,000 mg Oral Daily    [Held by provider]  dilTIAZem ER  240 mg Oral Daily    losartan (Cozaar) 50 mg, hydroCHLOROthiazide 12.5 mg for Hyzaar 50/12.5 ([facility] only)   Oral Daily    enoxaparin  40 mg Subcutaneous Daily       Assessment & Plan:      #Palpitations, lightheadedness   #recurrent SVT  - telemetry  - echo unremarkable  - diltiazem increased to 240mg. Wean off dilt gtt  - cardiology consulted  - no SVT noted this admission,  has chest tightness, consider stress test Monday. Possibly anxiety related?  -  trops neg  -CTA neg for PE  -plan stress test tomorrow    #sore throat, cough  -declines cepacol  -neg RVP  -pneumonitis? Underlying viral infx?    #anxiety  -untreated  -trial xanax did well. Will give Rx at DC. Consider SSRI as outpt.     #HTN  - arb-hydrochlorothiazide, diltiazem     #Hx of antiphospholipid antibody syndrome       Joey Wooten MD    Supplementary Documentation:     Quality:  DVT Mechanical Prophylaxis:   SCDs,    DVT Pharmacologic Prophylaxis   Medication    enoxaparin (Lovenox) 40 MG/0.4ML SUBQ injection 40 mg                Code Status: Not on file  Horn: No urinary catheter in place  Horn Duration (in days):   Central line:    BRAEDEN: 8/5/2024    Discharge is dependent on: course  At this point Ms. Baca is expected to be discharge to: home    The 21st Century Cures Act makes medical notes like these available to patients in the interest of transparency. Please be advised this is a medical document. Medical documents are intended to carry relevant information, facts as evident, and the clinical opinion of the practitioner. The medical note is intended as peer to peer communication and may appear blunt or direct. It is written in medical language and may contain abbreviations or verbiage that are unfamiliar.             **Certification      PHYSICIAN Certification of Need for Inpatient Hospitalization - Initial Certification    Patient will require inpatient services that will reasonably be expected to span two midnight's based  on the clinical documentation in H+P.   Based on patients current state of illness, I anticipate that, after discharge, patient will require TBD.

## 2024-08-05 ENCOUNTER — APPOINTMENT (OUTPATIENT)
Dept: CV DIAGNOSTICS | Facility: HOSPITAL | Age: 69
End: 2024-08-05
Attending: INTERNAL MEDICINE
Payer: MEDICARE

## 2024-08-05 ENCOUNTER — APPOINTMENT (OUTPATIENT)
Dept: ULTRASOUND IMAGING | Facility: HOSPITAL | Age: 69
End: 2024-08-05
Attending: HOSPITALIST
Payer: MEDICARE

## 2024-08-05 VITALS
BODY MASS INDEX: 27.27 KG/M2 | TEMPERATURE: 98 F | HEIGHT: 67 IN | DIASTOLIC BLOOD PRESSURE: 80 MMHG | RESPIRATION RATE: 16 BRPM | WEIGHT: 173.75 LBS | OXYGEN SATURATION: 92 % | HEART RATE: 83 BPM | SYSTOLIC BLOOD PRESSURE: 139 MMHG

## 2024-08-05 LAB
ATRIAL RATE: 96 BPM
P AXIS: 47 DEGREES
P-R INTERVAL: 174 MS
Q-T INTERVAL: 306 MS
Q-T INTERVAL: 348 MS
QRS DURATION: 76 MS
QRS DURATION: 98 MS
QTC CALCULATION (BEZET): 438 MS
QTC CALCULATION (BEZET): 439 MS
R AXIS: -2 DEGREES
R AXIS: 17 DEGREES
T AXIS: 38 DEGREES
T AXIS: 58 DEGREES
VENTRICULAR RATE: 123 BPM
VENTRICULAR RATE: 96 BPM

## 2024-08-05 PROCEDURE — 93971 EXTREMITY STUDY: CPT | Performed by: HOSPITALIST

## 2024-08-05 PROCEDURE — 93018 CV STRESS TEST I&R ONLY: CPT | Performed by: INTERNAL MEDICINE

## 2024-08-05 PROCEDURE — 93017 CV STRESS TEST TRACING ONLY: CPT | Performed by: INTERNAL MEDICINE

## 2024-08-05 PROCEDURE — 99239 HOSP IP/OBS DSCHRG MGMT >30: CPT | Performed by: HOSPITALIST

## 2024-08-05 PROCEDURE — 78452 HT MUSCLE IMAGE SPECT MULT: CPT | Performed by: INTERNAL MEDICINE

## 2024-08-05 RX ORDER — ALPRAZOLAM 0.25 MG/1
0.25 TABLET ORAL 3 TIMES DAILY PRN
Qty: 20 TABLET | Refills: 0 | Status: SHIPPED | OUTPATIENT
Start: 2024-08-05

## 2024-08-05 NOTE — DISCHARGE INSTRUCTIONS
If you have episode of SVT confirmed with elevated pulse rate, may take an extra cardizem cd 120 mg (already has this dose at home) to try and break it provided her systolic blood pressure is 110 or greater.

## 2024-08-05 NOTE — DISCHARGE SUMMARY
[facility] HOSPITALIST  DISCHARGE SUMMARY     Danuta Baca Patient Status:  Inpatient    5/10/1955 MRN [id number]   Location [facility] 2NE-A Attending Joey Wooten MD   Hosp Day # 3 PCP Teressa Pickard MD     Date of Admission: 2024  Date of Discharge:   24    Discharge Disposition: Home or Self Care    Discharge Diagnosis:  #Palpitations, lightheadedness   #recurrent SVT  - telemetry  - echo unremarkable  - diltiazem increased to 240mg. Weaned off dilt gtt  - cardiology consulted  - no SVT noted this admission,  has chest tightness  -stress test neg  -  trops neg  -CTA neg for PE      #L arm swelling  -likely thrombophlebitis from prev IV  -stat doppler  -warm compress     #sore throat, cough  -declines cepacol  -neg RVP  -pneumonitis? Underlying viral infx?    #anxiety  -untreated  -trial xanax did well. Will give Rx at DC. Consider SSRI as outpt.     #HTN  - arb-hydrochlorothiazide, diltiazem     #Hx of antiphospholipid antibody syndrome        History of Present Illness: Danuta Baca is a 69 year old female with  pmhx of SVT, HTN, HLD who presents with complaint of recurrent palpitations and lightheadedness/dizziness. She was just admitted  and discharged earlier today for recurrent SVT which improved/resolved after adenosine administration. Her echo was without concerning pathology and she was discharged to home on higher dose diltiazem. She was initially feeling well after discharge, but a few hours after returning home she developed recurrent palpitations and associated lightheadedness/dizziness. She denies any chest pain/pressure, SOB/PALMER.      Brief Synopsis: pt w/ palpitations after just being dc'ed for SVT. No evidence of SVT this admission. Suspect anxiety related. Son states pt has been very anxious for some time. Tried xanax and felt much better. Does have evidence of possible a mild viral respiratory illness. Underwent stress test which was negative. Can DC and f/u cards w/  [facility]. Will write rx for xanax. Should f/u pcp and consider SSRI.   Has some swelling from IV site. Will check doppler. Can DC home if negative    Lace+ Score: 52  59-90 High Risk  29-58 Medium Risk  0-28   Low Risk       TCM Follow-Up Recommendation:  LACE 29-58: Moderate Risk of readmission after discharge from the hospital.      Procedures during hospitalization:   none    Incidental or significant findings and recommendations (brief descriptions):  none    Lab/Test results pending at Discharge:   none    Consultants:  cards    Discharge Medication List:     Discharge Medications        START taking these medications        Instructions Prescription details   ALPRAZolam 0.25 MG Tabs  Commonly known as: Xanax      Take 1 tablet (0.25 mg total) by mouth 3 (three) times daily as needed for Anxiety.   Quantity: 20 tablet  Refills: 0            CONTINUE taking these medications        Instructions Prescription details   CALCIUM-D OR      Take 1 tablet by mouth daily.   Refills: 0     dilTIAZem  MG Cp24  Commonly known as: Cartia XT      Take 1 capsule (240 mg total) by mouth daily.   Quantity: 90 capsule  Refills: 3     Fluocinolone Acetonide 0.01 % Oil      1 drop by Each ear route once a week. As needed   Refills: 4     losartan-hydroCHLOROthiazide 50-12.5 MG Tabs  Commonly known as: Hyzaar      Take 1 tablet by mouth daily.   Refills: 0     NON FORMULARY      Take 1 capsule by mouth every evening. [employer] EZ Tears   Refills: 0     NON FORMULARY      Take 1 capsule by mouth every morning. [employer] Restore   Refills: 0     ONE A DAY WOMEN 50 PLUS OR      Take 1 tablet by mouth daily.   Refills: 0     valACYclovir 1 G Tabs  Commonly known as: Valtrex      Take 1 tablet (1,000 mg total) by mouth daily.   Quantity: 90 tablet  Refills: 3               Where to Get Your Medications        These medications were sent to [id number] [place], [place] - [address] AT [facility] [address]  [phone], [phone]  [address], [place] [address]      Phone: [phone]   ALPRAZolam 0.25 MG Tabs         [place] reviewed[place]    Follow-up appointment:   Teressa Pickard MD  [address]  [address]  [facility] [address] [phone]    Schedule an appointment as soon as possible for a visit      Appointments for Next 30 Days 2024 - 2024      None            Vital signs:  Temp:  [97.8 °F (36.6 °C)-98.7 °F (37.1 °C)] 97.8 °F (36.6 °C)  Pulse:  [72-88] 88  Resp:  [14-20] 16  BP: (116-136)/(70-86) 136/83  SpO2:  [93 %-97 %] 96 %    Physical Exam:    General: No acute distress   Lungs: clear to auscultation  Cardiovascular: S1, S2  Abdomen: Soft      -----------------------------------------------------------------------------------------------  PATIENT DISCHARGE INSTRUCTIONS: See electronic chart    Joey Wooten MD    Total time spent on discharge plannin minutes     The  [place] Cures Act makes medical notes like these available to patients in the interest of transparency. Please be advised this is a medical document. Medical documents are intended to carry relevant information, facts as evident, and the clinical opinion of the practitioner. The medical note is intended as peer to peer communication and may appear blunt or direct. It is written in medical language and may contain abbreviations or verbiage that are unfamiliar.

## 2024-08-05 NOTE — TELEPHONE ENCOUNTER
Tcm placed. Looks like still inpatient.     Front dest, please call the pt also to make appt, thank you

## 2024-08-05 NOTE — PLAN OF CARE
Discharged  home via wheelchair  Accompanied by Support staff   Belongings taken by patient/family  PIV removed  Tele box removed & placed in the drawer  Discharge Navigator completed  Discharge instructions reviewed with patient a bedside  All questions & concerns addressed at his time.           Problem: Patient/Family Goals  Goal: Patient/Family Long Term Goal  Description: Patient's Long Term Goal:   DC  home   Interventions:  - follow plan of care         - See additional Care Plan goals for specific interventions  Outcome: Progressing  Goal: Patient/Family Short Term Goal  Description: Patient's Short Term Goal: control BP and HR    Interventions:   - Cardizem gtt       - See additional Care Plan goals for specific interventions  Outcome: Progressing     Problem: SAFETY ADULT - FALL  Goal: Free from fall injury  Description: INTERVENTIONS:  - Assess pt frequently for physical needs  - Identify cognitive and physical deficits and behaviors that affect risk of falls.  - [place] fall precautions as indicated by assessment.  - Educate pt/family on patient safety including physical limitations  - Instruct pt to call for assistance with activity based on assessment  - Modify environment to reduce risk of injury  - Provide assistive devices as appropriate  - Consider OT/PT consult to assist with strengthening/mobility  - Encourage toileting schedule  Outcome: Progressing     Problem: DISCHARGE PLANNING  Goal: Discharge to home or other facility with appropriate resources  Description: INTERVENTIONS:  - Identify barriers to discharge w/pt and caregiver  - Include patient/family/discharge partner in discharge planning  - Arrange for needed discharge resources and transportation as appropriate  - Identify discharge learning needs (meds, wound care, etc)  - Arrange for interpreters to assist at discharge as needed  - Consider post-discharge preferences of patient/family/discharge partner  - Complete POLST form as  appropriate  - Assess patient's ability to be responsible for managing their own health  - Refer to Case Management Department for coordinating discharge planning if the patient needs post-hospital services based on physician/LIP order or complex needs related to functional status, cognitive ability or social support system  Outcome: Progressing     Problem: CARDIOVASCULAR - ADULT  Goal: Maintains optimal cardiac output and hemodynamic stability  Description: INTERVENTIONS:  - Monitor vital signs, rhythm, and trends  - Monitor for bleeding, hypotension and signs of decreased cardiac output  - Evaluate effectiveness of vasoactive medications to optimize hemodynamic stability  - Monitor arterial and/or venous puncture sites for bleeding and/or hematoma  - Assess quality of pulses, skin color and temperature  - Assess for signs of decreased coronary artery perfusion - ex. Angina  - Evaluate fluid balance, assess for edema, trend weights  Outcome: Progressing  Goal: Absence of cardiac arrhythmias or at baseline  Description: INTERVENTIONS:  - Continuous cardiac monitoring, monitor vital signs, obtain 12 lead EKG if indicated  - Evaluate effectiveness of antiarrhythmic and heart rate control medications as ordered  - Initiate emergency measures for life threatening arrhythmias  - Monitor electrolytes and administer replacement therapy as ordered  Outcome: Progressing

## 2024-08-05 NOTE — PLAN OF CARE
Patient is admitted for heart palpation. Pt is AOX4.  VSS, afebrile and denies any pain. SpO2 maintained on RA. . Denies any SOB, cough. Tele-NSR.  Lovenox.   Reg diet. Denies any n/v/d. Voids. Up with SBA.   PO Cardizem. Stress test negative. Left arm US doppler  negative for DVT. Will continue to monitor.  Pt is updated with plan of care.         Problem: Patient/Family Goals  Goal: Patient/Family Long Term Goal  Description: Patient's Long Term Goal:   DC  home   Interventions:  - follow plan of care         - See additional Care Plan goals for specific interventions  Outcome: Progressing  Goal: Patient/Family Short Term Goal  Description: Patient's Short Term Goal: control BP and HR    Interventions:   - Cardizem gtt       - See additional Care Plan goals for specific interventions  Outcome: Progressing     Problem: SAFETY ADULT - FALL  Goal: Free from fall injury  Description: INTERVENTIONS:  - Assess pt frequently for physical needs  - Identify cognitive and physical deficits and behaviors that affect risk of falls.  - [place] fall precautions as indicated by assessment.  - Educate pt/family on patient safety including physical limitations  - Instruct pt to call for assistance with activity based on assessment  - Modify environment to reduce risk of injury  - Provide assistive devices as appropriate  - Consider OT/PT consult to assist with strengthening/mobility  - Encourage toileting schedule  Outcome: Progressing     Problem: DISCHARGE PLANNING  Goal: Discharge to home or other facility with appropriate resources  Description: INTERVENTIONS:  - Identify barriers to discharge w/pt and caregiver  - Include patient/family/discharge partner in discharge planning  - Arrange for needed discharge resources and transportation as appropriate  - Identify discharge learning needs (meds, wound care, etc)  - Arrange for interpreters to assist at discharge as needed  - Consider post-discharge preferences of  patient/family/discharge partner  - Complete POLST form as appropriate  - Assess patient's ability to be responsible for managing their own health  - Refer to Case Management Department for coordinating discharge planning if the patient needs post-hospital services based on physician/LIP order or complex needs related to functional status, cognitive ability or social support system  Outcome: Progressing     Problem: CARDIOVASCULAR - ADULT  Goal: Maintains optimal cardiac output and hemodynamic stability  Description: INTERVENTIONS:  - Monitor vital signs, rhythm, and trends  - Monitor for bleeding, hypotension and signs of decreased cardiac output  - Evaluate effectiveness of vasoactive medications to optimize hemodynamic stability  - Monitor arterial and/or venous puncture sites for bleeding and/or hematoma  - Assess quality of pulses, skin color and temperature  - Assess for signs of decreased coronary artery perfusion - ex. Angina  - Evaluate fluid balance, assess for edema, trend weights  Outcome: Progressing  Goal: Absence of cardiac arrhythmias or at baseline  Description: INTERVENTIONS:  - Continuous cardiac monitoring, monitor vital signs, obtain 12 lead EKG if indicated  - Evaluate effectiveness of antiarrhythmic and heart rate control medications as ordered  - Initiate emergency measures for life threatening arrhythmias  - Monitor electrolytes and administer replacement therapy as ordered  Outcome: Progressing

## 2024-08-05 NOTE — PROGRESS NOTES
[facility]   part of [facility]     Cardiology Progress Note        Danuta Baca Patient Status:  Inpatient    5/10/1955 MRN [id number]   Location [facility] 2NE-A Attending Joey Wooten MD   Hosp Day # 3 PCP Teressa Pickard MD         Subjective/ROS:  Stable overnight without compaints    Objective:  /86 (BP Location: Right arm)   Pulse 75   Temp 98.2 °F (36.8 °C) (Oral)   Resp 16   Ht 5' 7\" (1.702 m)   Wt 173 lb 11.6 oz (78.8 kg)   SpO2 95%   BMI 27.21 kg/m²     Temp (24hrs), Av.2 °F (36.8 °C), Min:97.9 °F (36.6 °C), Max:98.7 °F (37.1 °C)      Tele  Sinus rhythm, run of sinus tach but no recurrent SVT    Intake/Output:    Intake/Output Summary (Last 24 hours) at 2024 0956  Last data filed at 2024 2100  Gross per 24 hour   Intake 1140 ml   Output --   Net 1140 ml       Patient Weight for the past 72 hrs:   Weight   24 2041 180 lb (81.6 kg)   24 2246 173 lb 11.6 oz (78.8 kg)        Physical Exam:    Gen: alert, oriented x 3, NAD  Heent: pupils equal, reactive. Mucous membranes moist.   Neck: no jvd  Cardiac: regular rate and rhythm, normal S1,S2  Lungs: CTA  Abd: soft, NT/ND +bs  Ext: no edema. LUE with firm tender area at prior iv site  Skin: Warm, dry  Neuro: No focal deficits    Labs:         CTA chest-negative for PE or aortic dissection.  Mild dependent atelectasis in the left lung.  Some groundglass opacities and some peribronchial thickening in the posterior lung bases and posterior left upper lung    Medications:     valACYclovir  1,000 mg Oral Daily    [Held by provider] dilTIAZem ER  240 mg Oral Daily    losartan (Cozaar) 50 mg, hydroCHLOROthiazide 12.5 mg for Hyzaar 50/12.5 ([facility] only)   Oral Daily    enoxaparin  40 mg Subcutaneous Daily           Assessment:    Recurrent palpitations and lightheadedness and patient just treated here for SVT requiring adenosine  No recurrent SVT on telemetry while here.  Cardizem CD was increased to 240 mg  daily prior to discharge last admission  Echo 8/2 with presrved lv/rv function, no wma   Chest pressure after ambulating, elevated D-dimer  Sx atypical   CTA negative for PE, aortic dissection  GGO on ct- per primary service  Hypo mg  Hypertension  Anxiety-reports significant improvement in this following dose of Xanax last night    Plan:     Await results of nuclear study- if negative, no further work up and can be dc'd from cardiac perspective  Continue ccb at current dose  Consider eps and ablation as op      Addendum- stress perfusion normal,   felt fluttering in chest during stress test but no arrythmias pressent.     Reviewed with pt and her son at bedside. Ok to dc once cleared by primary service (LUE US ordered for swelling at prior iv site)    Discussed plan of care with patient and nursing.       Eneida Bermeo NP  [phone]      Note reviewed and labs reviewed.  Agree with above assessment and plan.

## 2024-08-05 NOTE — PROGRESS NOTES
Cardiac Diagnostics:    Nuclear exercise stress completed. Pt did c/o mild chest pressure near peak exercise stating she was \"feeling the pressure she feels before her heart flutters\" No arrhthymias noted during this time. The feeling resolved early in recovery. Vitals remained stable,  Awaiting nuclear imaging.

## 2024-08-05 NOTE — TREATMENT PLAN
[facility]   part of [facility]       This letter is to certify that Ms. Danuta Baca was an inpatient at [facility] from 8/2/2024 thru 8/5/2024. Given the nature of her illness, family members were present with her during much of the time and may have had to miss work obligations because of this.     Thank you for your understanding.     Eneida Bermeo NP  [phone]

## 2024-08-05 NOTE — PLAN OF CARE
Assumed care of patient at 1930.   Pt is Aox4 on RA, needing 2L NC O2 when asleep.    Lung sounds clear.   NSR on tele.    Cardiac no caffeine diet d/t stress test on 8/5.  Pt is Continent.   Complains of no pain.   Pt is ambulating SBA.   Skin in unremarkable.   Bed at lowest position, room cleared of clutter, bed alarm is on, and call light is within reach.   POC discussed, fall precautions reviewed, and questions answered.      Problem: Patient/Family Goals  Goal: Patient/Family Long Term Goal  Description: Patient's Long Term Goal:   DC  home   Interventions:  - follow plan of care         - See additional Care Plan goals for specific interventions  8/5/2024 0225 by Lupe Farrar RN  Outcome: Progressing  8/5/2024 0224 by Lupe Farrar RN  Outcome: Progressing  Goal: Patient/Family Short Term Goal  Description: Patient's Short Term Goal: control BP and HR    Interventions:   - Cardizem gtt       - See additional Care Plan goals for specific interventions  8/5/2024 0225 by Lupe Farrar RN  Outcome: Progressing  8/5/2024 0224 by Lupe Farrar RN  Outcome: Progressing     Problem: SAFETY ADULT - FALL  Goal: Free from fall injury  Description: INTERVENTIONS:  - Assess pt frequently for physical needs  - Identify cognitive and physical deficits and behaviors that affect risk of falls.  - [place] fall precautions as indicated by assessment.  - Educate pt/family on patient safety including physical limitations  - Instruct pt to call for assistance with activity based on assessment  - Modify environment to reduce risk of injury  - Provide assistive devices as appropriate  - Consider OT/PT consult to assist with strengthening/mobility  - Encourage toileting schedule  8/5/2024 0225 by Lupe Farrar RN  Outcome: Progressing  8/5/2024 0224 by Lupe Farrar RN  Outcome: Progressing     Problem: DISCHARGE PLANNING  Goal: Discharge to home or other facility with appropriate resources  Description: INTERVENTIONS:  -  Identify barriers to discharge w/pt and caregiver  - Include patient/family/discharge partner in discharge planning  - Arrange for needed discharge resources and transportation as appropriate  - Identify discharge learning needs (meds, wound care, etc)  - Arrange for interpreters to assist at discharge as needed  - Consider post-discharge preferences of patient/family/discharge partner  - Complete POLST form as appropriate  - Assess patient's ability to be responsible for managing their own health  - Refer to Case Management Department for coordinating discharge planning if the patient needs post-hospital services based on physician/LIP order or complex needs related to functional status, cognitive ability or social support system  8/5/2024 0225 by Lupe Farrar, RN  Outcome: Progressing  8/5/2024 0224 by Lupe Farrar RN  Outcome: Progressing     Problem: CARDIOVASCULAR - ADULT  Goal: Maintains optimal cardiac output and hemodynamic stability  Description: INTERVENTIONS:  - Monitor vital signs, rhythm, and trends  - Monitor for bleeding, hypotension and signs of decreased cardiac output  - Evaluate effectiveness of vasoactive medications to optimize hemodynamic stability  - Monitor arterial and/or venous puncture sites for bleeding and/or hematoma  - Assess quality of pulses, skin color and temperature  - Assess for signs of decreased coronary artery perfusion - ex. Angina  - Evaluate fluid balance, assess for edema, trend weights  8/5/2024 0225 by Lupe Farrar, RN  Outcome: Progressing  8/5/2024 0224 by Lupe Farrar, RN  Outcome: Progressing  Goal: Absence of cardiac arrhythmias or at baseline  Description: INTERVENTIONS:  - Continuous cardiac monitoring, monitor vital signs, obtain 12 lead EKG if indicated  - Evaluate effectiveness of antiarrhythmic and heart rate control medications as ordered  - Initiate emergency measures for life threatening arrhythmias  - Monitor electrolytes and administer replacement  therapy as ordered  8/5/2024 0225 by Lupe Farrar, RN  Outcome: Progressing  8/5/2024 0224 by Lupe Farrar, RN  Outcome: Progressing

## 2024-08-08 ENCOUNTER — PATIENT OUTREACH (OUTPATIENT)
Dept: CASE MANAGEMENT | Age: 69
End: 2024-08-08

## 2024-08-08 NOTE — PROGRESS NOTES
TCM is unable to contact patient and patient needs additional appointments.  Please contact patient for assistance with scheduling a follow-up appointment for  PCP .  Thank you!

## 2024-08-08 NOTE — PROGRESS NOTES
PCP apt request (discharged 08/05)    Dr Teressa Pickard  Internal Medicine  [facility]  [address]  [address]  [facility] [address] [phone]  LVM to call [phone] to assist w/scheduling apt

## 2024-08-09 NOTE — PROGRESS NOTES
PCP apt request (discharged 08/05)     Dr Teressa Pickard  Internal Medicine  [facility]  [address]  [address]  [facility] [address] [phone]  Pt advised she has seen her new PCP @[facility] - Dr Griffin Flores  Cardiovascular Diseases, CARDIOLOGY  [facility] [id number] [phone]  Apt made:  Tue 08/13 @2:30pm for Heart Monitor  Tue 09/24 @8:10am w/Dr Eric Quinones  Confirmed w/pt  Closing encounter   Left knee pain        Family History   Problem Relation Age of Onset    Cancer Mother     Cancer Father         LIVER       Past Surgical History:   Procedure Laterality Date    COLONOSCOPY  3/2013    normal Dr Avila Nahid repeat 2023    KNEE CARTILAGE SURGERY  1980\"S    LEFT KNEE    PATELLA SURGERY  1980'S    LEFT    TONSILLECTOMY AND ADENOIDECTOMY  1952       Social History     Socioeconomic History    Marital status:      Spouse name: Not on file    Number of children: Not on file    Years of education: Not on file    Highest education level: Not on file   Occupational History    Not on file   Social Needs    Financial resource strain: Not on file    Food insecurity     Worry: Not on file     Inability: Not on file    Transportation needs     Medical: Not on file     Non-medical: Not on file   Tobacco Use    Smoking status: Never Smoker    Smokeless tobacco: Never Used   Substance and Sexual Activity    Alcohol use: Yes     Comment: OCCASIONALLY    Drug use: No    Sexual activity: Not on file   Lifestyle    Physical activity     Days per week: Not on file     Minutes per session: Not on file    Stress: Not on file   Relationships    Social connections     Talks on phone: Not on file     Gets together: Not on file     Attends Judaism service: Not on file     Active member of club or organization: Not on file     Attends meetings of clubs or organizations: Not on file     Relationship status: Not on file    Intimate partner violence     Fear of current or ex partner: Not on file     Emotionally abused: Not on file     Physically abused: Not on file     Forced sexual activity: Not on file   Other Topics Concern    Not on file   Social History Narrative    Not on file       Review of Systems  Review of Systems   Constitutional: Positive for unexpected weight change (down a few # ). HENT: Negative. Eyes: Negative. Glasses  Early cataracts    Respiratory: Negative. Cardiovascular: Negative. Gastrointestinal: Negative. Colonoscopy was ok repeat 2023   Endocrine: Negative. Genitourinary: Positive for frequency (occ nocturia ). Episode of renal colic as noted prn meds    Musculoskeletal: Positive for arthralgias (occ knee pain ). Negative for myalgias, neck pain and neck stiffness. L sided neck tingling see HPI    Skin: Negative. Allergic/Immunologic: Negative. Neurological: Negative for tremors, speech difficulty, weakness, light-headedness and numbness. Dizziness: occ sx  occ balance . sx         Tingling see HPI    Hematological: Negative. Psychiatric/Behavioral: Negative. Objective:   Physical Exam:  Physical Exam  Constitutional:       Appearance: Normal appearance. He is well-developed. HENT:      Head: Normocephalic and atraumatic. Right Ear: External ear normal.      Left Ear: External ear normal.      Nose: Nose normal.   Eyes:      Conjunctiva/sclera: Conjunctivae normal.      Pupils: Pupils are equal, round, and reactive to light. Neck:      Musculoskeletal: Normal range of motion and neck supple. Thyroid: No thyromegaly. Trachea: No tracheal deviation. Cardiovascular:      Rate and Rhythm: Normal rate and regular rhythm. Pulses: Normal pulses. Heart sounds: Normal heart sounds. Pulmonary:      Effort: Pulmonary effort is normal.      Breath sounds: Normal breath sounds. Abdominal:      General: Abdomen is flat. Bowel sounds are normal.      Palpations: Abdomen is soft. Genitourinary:     Penis: No tenderness. Musculoskeletal: Normal range of motion. General: Tenderness (L trapezius region ) present. Skin:     General: Skin is warm and dry. Neurological:      General: No focal deficit present. Mental Status: He is alert and oriented to person, place, and time. Mental status is at baseline. Cranial Nerves: No cranial nerve deficit. Motor: No weakness. Deep Tendon Reflexes: Reflexes are normal and symmetric. Psychiatric:         Mood and Affect: Mood normal.         Behavior: Behavior normal.         /80 (Site: Right Upper Arm, Position: Sitting, Cuff Size: Medium Adult)   Pulse 68   Temp 97.9 °F (36.6 °C) (Oral)   Resp 14   Ht 6' 2\" (1.88 m)   Wt 190 lb 9.6 oz (86.5 kg)   BMI 24.47 kg/m²       Assessment & Plan:         Chronic fatigue  occ symptoms check labs with CPE    Hyperlipidemia  Stable no change in meds return in 3 mo. Labs pending cpe     Nephrolithiasis  No recent symptoms as noted     Tingling  Cont with symptoms ?  Trapezius muscle in origin to cont Mobic  And ROM  Will send for EMG     Vertigo  No recent symptoms     Vitamin D deficiency  Continue current meds

## 2024-08-11 NOTE — CDS QUERY
Clinical Documentation Clarification   Dear Dr. Wooten,   Please clarify the diagnosis of Pneumonitis   [   ] Pneumonitis is ruled out  [   ] Pneumonitis confirmed   [   ]  Other (please specify)_________________________________________     Clinical Indicators   >08/04/24 Hospitalist- sore throat, cough -declines cepacol -neg RVP pneumonitis? underlying viral infx?  >Discharge summary- pneumonitis? Underlying viral infx?  -08/03/24 CTA chest - CONCLUSION: There are some ground-glass opacities and some peribronchial thickening in the posterior lung bases and posterior DAYSI either representing acute pneumonitis, small airway disease or atelectasis.    Risk Factors 69 year old female was here with SVT and discharged then came back to ER with recurrent palpitations and dizziness.     Treatment Supportive care      Use of terms such as suspected, possible, or probable (associated with a specific diagnosis that is being evaluated, monitored, or treated as if it exists) are acceptable and can be coded in the inpatient setting, when documented at the time of discharge.   Please add any additional documentation to your progress note and continue to document this through discharge.  If you have any questions, please contact Clinical : Angela Saucedo RN /BSN @ [phone] or email [email or web address]  Thank You!                                                 THIS FORM IS A PERMANENT PART OF THE MEDICAL RECORD

## 2024-08-12 RX ORDER — VALACYCLOVIR HYDROCHLORIDE 1 G/1
1000 TABLET, FILM COATED ORAL DAILY
Qty: 90 TABLET | Refills: 3 | OUTPATIENT
Start: 2024-08-12

## 2025-01-16 ENCOUNTER — ANESTHESIA EVENT (OUTPATIENT)
Dept: SURGERY | Facility: HOSPITAL | Age: 70
End: 2025-01-16
Payer: MEDICARE

## 2025-01-16 ENCOUNTER — HOSPITAL ENCOUNTER (INPATIENT)
Facility: HOSPITAL | Age: 70
LOS: 2 days | Discharge: HOME | End: 2025-01-18
Attending: EMERGENCY MEDICINE | Admitting: STUDENT IN AN ORGANIZED HEALTH CARE EDUCATION/TRAINING PROGRAM
Payer: MEDICARE

## 2025-01-16 ENCOUNTER — ANESTHESIA (OUTPATIENT)
Dept: SURGERY | Facility: HOSPITAL | Age: 70
End: 2025-01-16
Payer: MEDICARE

## 2025-01-16 ENCOUNTER — HOSPITAL ENCOUNTER (OUTPATIENT)
Age: 70
Discharge: ANOTHER HOSPITAL | End: 2025-01-16
Payer: MEDICARE

## 2025-01-16 ENCOUNTER — APPOINTMENT (OUTPATIENT)
Dept: GENERAL RADIOLOGY | Facility: HOSPITAL | Age: 70
End: 2025-01-16
Attending: SURGERY
Payer: MEDICARE

## 2025-01-16 ENCOUNTER — APPOINTMENT (OUTPATIENT)
Dept: CT IMAGING | Facility: HOSPITAL | Age: 70
End: 2025-01-16
Attending: EMERGENCY MEDICINE
Payer: MEDICARE

## 2025-01-16 VITALS
RESPIRATION RATE: 18 BRPM | OXYGEN SATURATION: 97 % | HEART RATE: 115 BPM | TEMPERATURE: 98 F | SYSTOLIC BLOOD PRESSURE: 128 MMHG | DIASTOLIC BLOOD PRESSURE: 68 MMHG

## 2025-01-16 DIAGNOSIS — R10.11 ABDOMINAL PAIN, RIGHT UPPER QUADRANT: Primary | ICD-10-CM

## 2025-01-16 DIAGNOSIS — K81.9 CHOLECYSTITIS: ICD-10-CM

## 2025-01-16 DIAGNOSIS — K81.0 ACUTE CHOLECYSTITIS: Primary | ICD-10-CM

## 2025-01-16 DIAGNOSIS — G89.18 POSTOPERATIVE PAIN: ICD-10-CM

## 2025-01-16 DIAGNOSIS — K80.01 CALCULUS OF GALLBLADDER WITH ACUTE CHOLECYSTITIS AND OBSTRUCTION: ICD-10-CM

## 2025-01-16 PROBLEM — E80.6 HYPERBILIRUBINEMIA: Status: ACTIVE | Noted: 2025-01-16

## 2025-01-16 LAB
ALBUMIN SERPL-MCNC: 3.9 G/DL (ref 3.2–4.8)
ALBUMIN/GLOB SERPL: 1.6 {RATIO} (ref 1–2)
ALP LIVER SERPL-CCNC: 68 U/L
ALT SERPL-CCNC: 19 U/L
ANION GAP SERPL CALC-SCNC: 10 MMOL/L (ref 0–18)
AST SERPL-CCNC: 19 U/L (ref ?–34)
ATRIAL RATE: 110 BPM
BASOPHILS # BLD: 0 X10(3) UL (ref 0–0.2)
BASOPHILS NFR BLD: 0 %
BILIRUB SERPL-MCNC: 1.2 MG/DL (ref 0.2–1.1)
BILIRUB UR QL STRIP.AUTO: NEGATIVE
BUN BLD-MCNC: 12 MG/DL (ref 9–23)
CALCIUM BLD-MCNC: 9.4 MG/DL (ref 8.7–10.6)
CHLORIDE SERPL-SCNC: 94 MMOL/L (ref 98–112)
CLARITY UR REFRACT.AUTO: CLEAR
CO2 SERPL-SCNC: 27 MMOL/L (ref 21–32)
CREAT BLD-MCNC: 0.85 MG/DL
EGFRCR SERPLBLD CKD-EPI 2021: 75 ML/MIN/1.73M2 (ref 60–?)
EOSINOPHIL # BLD: 0 X10(3) UL (ref 0–0.7)
EOSINOPHIL NFR BLD: 0 %
ERYTHROCYTE [DISTWIDTH] IN BLOOD BY AUTOMATED COUNT: 13.2 %
GLOBULIN PLAS-MCNC: 2.5 G/DL (ref 2–3.5)
GLUCOSE BLD-MCNC: 128 MG/DL (ref 70–99)
GLUCOSE UR STRIP.AUTO-MCNC: NORMAL MG/DL
HCT VFR BLD AUTO: 35.4 %
HGB BLD-MCNC: 12.5 G/DL
KETONES UR STRIP.AUTO-MCNC: NEGATIVE MG/DL
LACTATE SERPL-SCNC: 1.3 MMOL/L (ref 0.5–2)
LEUKOCYTE ESTERASE UR QL STRIP.AUTO: NEGATIVE
LIPASE SERPL-CCNC: 22 U/L (ref 12–53)
LYMPHOCYTES NFR BLD: 1.16 X10(3) UL (ref 1–4)
LYMPHOCYTES NFR BLD: 4 %
MCH RBC QN AUTO: 30 PG (ref 26–34)
MCHC RBC AUTO-ENTMCNC: 35.3 G/DL (ref 31–37)
MCV RBC AUTO: 84.9 FL
MONOCYTES # BLD: 2.33 X10(3) UL (ref 0.1–1)
MONOCYTES NFR BLD: 8 %
MORPHOLOGY: NORMAL
NEUTROPHILS # BLD AUTO: 24.97 X10 (3) UL (ref 1.5–7.7)
NEUTROPHILS NFR BLD: 82 %
NEUTS BAND NFR BLD: 6 %
NEUTS HYPERSEG # BLD: 25.61 X10(3) UL (ref 1.5–7.7)
NITRITE UR QL STRIP.AUTO: NEGATIVE
OSMOLALITY SERPL CALC.SUM OF ELEC: 273 MOSM/KG (ref 275–295)
P AXIS: 42 DEGREES
P-R INTERVAL: 158 MS
PH UR STRIP.AUTO: 6.5 [PH] (ref 5–8)
PLATELET # BLD AUTO: 166 10(3)UL (ref 150–450)
PLATELET MORPHOLOGY: NORMAL
POTASSIUM SERPL-SCNC: 3.6 MMOL/L (ref 3.5–5.1)
PROT SERPL-MCNC: 6.4 G/DL (ref 5.7–8.2)
PROT UR STRIP.AUTO-MCNC: NEGATIVE MG/DL
Q-T INTERVAL: 322 MS
QRS DURATION: 82 MS
QTC CALCULATION (BEZET): 435 MS
R AXIS: 6 DEGREES
RBC # BLD AUTO: 4.17 X10(6)UL
RBC UR QL AUTO: NEGATIVE
SODIUM SERPL-SCNC: 131 MMOL/L (ref 136–145)
SP GR UR STRIP.AUTO: 1 (ref 1–1.03)
T AXIS: 45 DEGREES
TOTAL CELLS COUNTED BLD: 100
UROBILINOGEN UR STRIP.AUTO-MCNC: NORMAL MG/DL
VENTRICULAR RATE: 110 BPM
WBC # BLD AUTO: 29.1 X10(3) UL (ref 4–11)

## 2025-01-16 PROCEDURE — 74300 X-RAY BILE DUCTS/PANCREAS: CPT | Performed by: SURGERY

## 2025-01-16 PROCEDURE — 0WQF4ZZ REPAIR ABDOMINAL WALL, PERCUTANEOUS ENDOSCOPIC APPROACH: ICD-10-PCS | Performed by: SURGERY

## 2025-01-16 PROCEDURE — 99223 1ST HOSP IP/OBS HIGH 75: CPT | Performed by: STUDENT IN AN ORGANIZED HEALTH CARE EDUCATION/TRAINING PROGRAM

## 2025-01-16 PROCEDURE — BF101ZZ FLUOROSCOPY OF BILE DUCTS USING LOW OSMOLAR CONTRAST: ICD-10-PCS | Performed by: SURGERY

## 2025-01-16 PROCEDURE — 99215 OFFICE O/P EST HI 40 MIN: CPT | Performed by: NURSE PRACTITIONER

## 2025-01-16 PROCEDURE — 0FT44ZZ RESECTION OF GALLBLADDER, PERCUTANEOUS ENDOSCOPIC APPROACH: ICD-10-PCS | Performed by: SURGERY

## 2025-01-16 PROCEDURE — 99222 1ST HOSP IP/OBS MODERATE 55: CPT | Performed by: SURGERY

## 2025-01-16 PROCEDURE — 74177 CT ABD & PELVIS W/CONTRAST: CPT | Performed by: EMERGENCY MEDICINE

## 2025-01-16 RX ORDER — MORPHINE SULFATE 2 MG/ML
1 INJECTION, SOLUTION INTRAMUSCULAR; INTRAVENOUS EVERY 2 HOUR PRN
Status: DISCONTINUED | OUTPATIENT
Start: 2025-01-16 | End: 2025-01-18

## 2025-01-16 RX ORDER — CEFAZOLIN SODIUM 1 G/3ML
INJECTION, POWDER, FOR SOLUTION INTRAMUSCULAR; INTRAVENOUS AS NEEDED
Status: DISCONTINUED | OUTPATIENT
Start: 2025-01-16 | End: 2025-01-16 | Stop reason: SURG

## 2025-01-16 RX ORDER — HYDROMORPHONE HYDROCHLORIDE 1 MG/ML
0.2 INJECTION, SOLUTION INTRAMUSCULAR; INTRAVENOUS; SUBCUTANEOUS EVERY 2 HOUR PRN
Status: DISCONTINUED | OUTPATIENT
Start: 2025-01-16 | End: 2025-01-18

## 2025-01-16 RX ORDER — HYDROMORPHONE HYDROCHLORIDE 1 MG/ML
0.4 INJECTION, SOLUTION INTRAMUSCULAR; INTRAVENOUS; SUBCUTANEOUS EVERY 2 HOUR PRN
Status: DISCONTINUED | OUTPATIENT
Start: 2025-01-16 | End: 2025-01-18

## 2025-01-16 RX ORDER — HYDRALAZINE HYDROCHLORIDE 20 MG/ML
5 INJECTION INTRAMUSCULAR; INTRAVENOUS EVERY 6 HOURS PRN
Status: DISCONTINUED | OUTPATIENT
Start: 2025-01-16 | End: 2025-01-18

## 2025-01-16 RX ORDER — SODIUM CHLORIDE, SODIUM LACTATE, POTASSIUM CHLORIDE, CALCIUM CHLORIDE 600; 310; 30; 20 MG/100ML; MG/100ML; MG/100ML; MG/100ML
INJECTION, SOLUTION INTRAVENOUS CONTINUOUS
Status: DISCONTINUED | OUTPATIENT
Start: 2025-01-16 | End: 2025-01-16 | Stop reason: HOSPADM

## 2025-01-16 RX ORDER — ONDANSETRON 2 MG/ML
4 INJECTION INTRAMUSCULAR; INTRAVENOUS EVERY 6 HOURS PRN
Status: DISCONTINUED | OUTPATIENT
Start: 2025-01-16 | End: 2025-01-18

## 2025-01-16 RX ORDER — ENOXAPARIN SODIUM 100 MG/ML
40 INJECTION SUBCUTANEOUS DAILY
Status: DISCONTINUED | OUTPATIENT
Start: 2025-01-16 | End: 2025-01-16

## 2025-01-16 RX ORDER — ALPRAZOLAM 0.25 MG/1
0.25 TABLET ORAL 3 TIMES DAILY PRN
Status: DISCONTINUED | OUTPATIENT
Start: 2025-01-16 | End: 2025-01-18

## 2025-01-16 RX ORDER — DIPHENHYDRAMINE HYDROCHLORIDE 50 MG/ML
12.5 INJECTION INTRAMUSCULAR; INTRAVENOUS AS NEEDED
Status: DISCONTINUED | OUTPATIENT
Start: 2025-01-16 | End: 2025-01-16 | Stop reason: HOSPADM

## 2025-01-16 RX ORDER — LIDOCAINE HYDROCHLORIDE 10 MG/ML
INJECTION, SOLUTION EPIDURAL; INFILTRATION; INTRACAUDAL; PERINEURAL AS NEEDED
Status: DISCONTINUED | OUTPATIENT
Start: 2025-01-16 | End: 2025-01-16 | Stop reason: SURG

## 2025-01-16 RX ORDER — CITRIC ACID/SODIUM CITRATE 334-500MG
SOLUTION, ORAL ORAL AS NEEDED
Status: DISCONTINUED | OUTPATIENT
Start: 2025-01-16 | End: 2025-01-16 | Stop reason: SURG

## 2025-01-16 RX ORDER — SODIUM CHLORIDE 9 MG/ML
INJECTION, SOLUTION INTRAVENOUS CONTINUOUS
Status: ACTIVE | OUTPATIENT
Start: 2025-01-16 | End: 2025-01-16

## 2025-01-16 RX ORDER — BISACODYL 10 MG
10 SUPPOSITORY, RECTAL RECTAL
Status: DISCONTINUED | OUTPATIENT
Start: 2025-01-16 | End: 2025-01-18

## 2025-01-16 RX ORDER — SENNOSIDES 8.6 MG
17.2 TABLET ORAL NIGHTLY PRN
Status: DISCONTINUED | OUTPATIENT
Start: 2025-01-16 | End: 2025-01-18

## 2025-01-16 RX ORDER — OXYCODONE HYDROCHLORIDE 5 MG/1
5 TABLET ORAL EVERY 4 HOURS PRN
Status: DISCONTINUED | OUTPATIENT
Start: 2025-01-16 | End: 2025-01-18

## 2025-01-16 RX ORDER — HYDRALAZINE HYDROCHLORIDE 20 MG/ML
5 INJECTION INTRAMUSCULAR; INTRAVENOUS
Status: DISCONTINUED | OUTPATIENT
Start: 2025-01-16 | End: 2025-01-16 | Stop reason: HOSPADM

## 2025-01-16 RX ORDER — SODIUM CHLORIDE, SODIUM LACTATE, POTASSIUM CHLORIDE, CALCIUM CHLORIDE 600; 310; 30; 20 MG/100ML; MG/100ML; MG/100ML; MG/100ML
INJECTION, SOLUTION INTRAVENOUS CONTINUOUS PRN
Status: DISCONTINUED | OUTPATIENT
Start: 2025-01-16 | End: 2025-01-16 | Stop reason: SURG

## 2025-01-16 RX ORDER — MEPERIDINE HYDROCHLORIDE 25 MG/ML
12.5 INJECTION INTRAMUSCULAR; INTRAVENOUS; SUBCUTANEOUS AS NEEDED
Status: DISCONTINUED | OUTPATIENT
Start: 2025-01-16 | End: 2025-01-16 | Stop reason: HOSPADM

## 2025-01-16 RX ORDER — SODIUM PHOSPHATE, DIBASIC AND SODIUM PHOSPHATE, MONOBASIC 7; 19 G/230ML; G/230ML
1 ENEMA RECTAL ONCE AS NEEDED
Status: DISCONTINUED | OUTPATIENT
Start: 2025-01-16 | End: 2025-01-18

## 2025-01-16 RX ORDER — ENOXAPARIN SODIUM 100 MG/ML
40 INJECTION SUBCUTANEOUS DAILY
Status: DISCONTINUED | OUTPATIENT
Start: 2025-01-16 | End: 2025-01-18

## 2025-01-16 RX ORDER — IOPAMIDOL 612 MG/ML
INJECTION, SOLUTION INTRAVASCULAR AS NEEDED
Status: DISCONTINUED | OUTPATIENT
Start: 2025-01-16 | End: 2025-01-16 | Stop reason: HOSPADM

## 2025-01-16 RX ORDER — NALOXONE HYDROCHLORIDE 0.4 MG/ML
80 INJECTION, SOLUTION INTRAMUSCULAR; INTRAVENOUS; SUBCUTANEOUS AS NEEDED
Status: DISCONTINUED | OUTPATIENT
Start: 2025-01-16 | End: 2025-01-16 | Stop reason: HOSPADM

## 2025-01-16 RX ORDER — MORPHINE SULFATE 2 MG/ML
0.5 INJECTION, SOLUTION INTRAMUSCULAR; INTRAVENOUS EVERY 2 HOUR PRN
Status: DISCONTINUED | OUTPATIENT
Start: 2025-01-16 | End: 2025-01-18

## 2025-01-16 RX ORDER — ONDANSETRON 2 MG/ML
4 INJECTION INTRAMUSCULAR; INTRAVENOUS EVERY 4 HOURS PRN
Status: DISCONTINUED | OUTPATIENT
Start: 2025-01-16 | End: 2025-01-16

## 2025-01-16 RX ORDER — MORPHINE SULFATE 4 MG/ML
4 INJECTION, SOLUTION INTRAMUSCULAR; INTRAVENOUS EVERY 30 MIN PRN
Status: DISCONTINUED | OUTPATIENT
Start: 2025-01-16 | End: 2025-01-16

## 2025-01-16 RX ORDER — POLYETHYLENE GLYCOL 3350 17 G/17G
17 POWDER, FOR SOLUTION ORAL DAILY PRN
Status: DISCONTINUED | OUTPATIENT
Start: 2025-01-16 | End: 2025-01-18

## 2025-01-16 RX ORDER — ONDANSETRON 2 MG/ML
4 INJECTION INTRAMUSCULAR; INTRAVENOUS ONCE
Status: DISCONTINUED | OUTPATIENT
Start: 2025-01-16 | End: 2025-01-18

## 2025-01-16 RX ORDER — HYDROMORPHONE HYDROCHLORIDE 1 MG/ML
0.4 INJECTION, SOLUTION INTRAMUSCULAR; INTRAVENOUS; SUBCUTANEOUS EVERY 5 MIN PRN
Status: DISCONTINUED | OUTPATIENT
Start: 2025-01-16 | End: 2025-01-16 | Stop reason: HOSPADM

## 2025-01-16 RX ORDER — KETOROLAC TROMETHAMINE 30 MG/ML
INJECTION, SOLUTION INTRAMUSCULAR; INTRAVENOUS AS NEEDED
Status: DISCONTINUED | OUTPATIENT
Start: 2025-01-16 | End: 2025-01-16 | Stop reason: SURG

## 2025-01-16 RX ORDER — HYDROCODONE BITARTRATE AND ACETAMINOPHEN 5; 325 MG/1; MG/1
1 TABLET ORAL EVERY 6 HOURS PRN
Qty: 15 TABLET | Refills: 0 | Status: SHIPPED | OUTPATIENT
Start: 2025-01-16 | End: 2025-01-18

## 2025-01-16 RX ORDER — SODIUM CHLORIDE, SODIUM LACTATE, POTASSIUM CHLORIDE, CALCIUM CHLORIDE 600; 310; 30; 20 MG/100ML; MG/100ML; MG/100ML; MG/100ML
INJECTION, SOLUTION INTRAVENOUS CONTINUOUS
Status: DISCONTINUED | OUTPATIENT
Start: 2025-01-16 | End: 2025-01-18

## 2025-01-16 RX ORDER — SODIUM CHLORIDE 9 MG/ML
INJECTION, SOLUTION INTRAVENOUS CONTINUOUS
Status: DISCONTINUED | OUTPATIENT
Start: 2025-01-16 | End: 2025-01-18

## 2025-01-16 RX ORDER — METOCLOPRAMIDE HYDROCHLORIDE 5 MG/ML
10 INJECTION INTRAMUSCULAR; INTRAVENOUS EVERY 8 HOURS PRN
Status: DISCONTINUED | OUTPATIENT
Start: 2025-01-16 | End: 2025-01-18

## 2025-01-16 RX ORDER — ONDANSETRON 2 MG/ML
INJECTION INTRAMUSCULAR; INTRAVENOUS AS NEEDED
Status: DISCONTINUED | OUTPATIENT
Start: 2025-01-16 | End: 2025-01-16 | Stop reason: SURG

## 2025-01-16 RX ORDER — DILTIAZEM HYDROCHLORIDE 240 MG/1
240 CAPSULE, COATED, EXTENDED RELEASE ORAL DAILY
Status: DISCONTINUED | OUTPATIENT
Start: 2025-01-16 | End: 2025-01-18

## 2025-01-16 RX ORDER — ACETAMINOPHEN 500 MG
1000 TABLET ORAL EVERY 8 HOURS PRN
Status: DISCONTINUED | OUTPATIENT
Start: 2025-01-16 | End: 2025-01-18

## 2025-01-16 RX ORDER — BUPIVACAINE HYDROCHLORIDE AND EPINEPHRINE 5; 5 MG/ML; UG/ML
INJECTION, SOLUTION EPIDURAL; INTRACAUDAL; PERINEURAL AS NEEDED
Status: DISCONTINUED | OUTPATIENT
Start: 2025-01-16 | End: 2025-01-16 | Stop reason: HOSPADM

## 2025-01-16 RX ORDER — HYDROMORPHONE HYDROCHLORIDE 1 MG/ML
0.2 INJECTION, SOLUTION INTRAMUSCULAR; INTRAVENOUS; SUBCUTANEOUS EVERY 5 MIN PRN
Status: DISCONTINUED | OUTPATIENT
Start: 2025-01-16 | End: 2025-01-16 | Stop reason: HOSPADM

## 2025-01-16 RX ORDER — ACETAMINOPHEN 500 MG
1000 TABLET ORAL ONCE AS NEEDED
Status: DISCONTINUED | OUTPATIENT
Start: 2025-01-16 | End: 2025-01-16 | Stop reason: HOSPADM

## 2025-01-16 RX ORDER — ECHINACEA PURPUREA EXTRACT 125 MG
1 TABLET ORAL
Status: DISCONTINUED | OUTPATIENT
Start: 2025-01-16 | End: 2025-01-18

## 2025-01-16 RX ORDER — LABETALOL HYDROCHLORIDE 5 MG/ML
5 INJECTION, SOLUTION INTRAVENOUS EVERY 5 MIN PRN
Status: DISCONTINUED | OUTPATIENT
Start: 2025-01-16 | End: 2025-01-16 | Stop reason: HOSPADM

## 2025-01-16 RX ORDER — HYDRALAZINE HYDROCHLORIDE 20 MG/ML
5 INJECTION INTRAMUSCULAR; INTRAVENOUS EVERY 6 HOURS PRN
Status: DISCONTINUED | OUTPATIENT
Start: 2025-01-16 | End: 2025-01-16

## 2025-01-16 RX ORDER — METOCLOPRAMIDE HYDROCHLORIDE 5 MG/ML
INJECTION INTRAMUSCULAR; INTRAVENOUS AS NEEDED
Status: DISCONTINUED | OUTPATIENT
Start: 2025-01-16 | End: 2025-01-16 | Stop reason: SURG

## 2025-01-16 RX ORDER — HYDROCODONE BITARTRATE AND ACETAMINOPHEN 5; 325 MG/1; MG/1
1 TABLET ORAL EVERY 6 HOURS PRN
Status: DISCONTINUED | OUTPATIENT
Start: 2025-01-16 | End: 2025-01-18

## 2025-01-16 RX ORDER — HYDROMORPHONE HYDROCHLORIDE 1 MG/ML
0.6 INJECTION, SOLUTION INTRAMUSCULAR; INTRAVENOUS; SUBCUTANEOUS EVERY 5 MIN PRN
Status: DISCONTINUED | OUTPATIENT
Start: 2025-01-16 | End: 2025-01-16 | Stop reason: HOSPADM

## 2025-01-16 RX ORDER — ROCURONIUM BROMIDE 10 MG/ML
INJECTION, SOLUTION INTRAVENOUS AS NEEDED
Status: DISCONTINUED | OUTPATIENT
Start: 2025-01-16 | End: 2025-01-16 | Stop reason: SURG

## 2025-01-16 RX ORDER — METOCLOPRAMIDE HYDROCHLORIDE 5 MG/ML
10 INJECTION INTRAMUSCULAR; INTRAVENOUS EVERY 8 HOURS PRN
Status: DISCONTINUED | OUTPATIENT
Start: 2025-01-16 | End: 2025-01-16 | Stop reason: HOSPADM

## 2025-01-16 RX ORDER — MORPHINE SULFATE 2 MG/ML
2 INJECTION, SOLUTION INTRAMUSCULAR; INTRAVENOUS EVERY 2 HOUR PRN
Status: DISCONTINUED | OUTPATIENT
Start: 2025-01-16 | End: 2025-01-18

## 2025-01-16 RX ORDER — MIDAZOLAM HYDROCHLORIDE 1 MG/ML
1 INJECTION INTRAMUSCULAR; INTRAVENOUS EVERY 5 MIN PRN
Status: DISCONTINUED | OUTPATIENT
Start: 2025-01-16 | End: 2025-01-16 | Stop reason: HOSPADM

## 2025-01-16 RX ORDER — METOPROLOL TARTRATE 1 MG/ML
2.5 INJECTION, SOLUTION INTRAVENOUS ONCE
Status: DISCONTINUED | OUTPATIENT
Start: 2025-01-16 | End: 2025-01-16 | Stop reason: HOSPADM

## 2025-01-16 RX ORDER — ACETAMINOPHEN 500 MG
1000 TABLET ORAL EVERY 8 HOURS SCHEDULED
Status: DISCONTINUED | OUTPATIENT
Start: 2025-01-16 | End: 2025-01-18

## 2025-01-16 RX ORDER — ONDANSETRON 2 MG/ML
4 INJECTION INTRAMUSCULAR; INTRAVENOUS EVERY 6 HOURS PRN
Status: DISCONTINUED | OUTPATIENT
Start: 2025-01-16 | End: 2025-01-16 | Stop reason: HOSPADM

## 2025-01-16 RX ADMIN — ROCURONIUM BROMIDE 5 MG: 10 INJECTION, SOLUTION INTRAVENOUS at 13:41:00

## 2025-01-16 RX ADMIN — KETOROLAC TROMETHAMINE 15 MG: 30 INJECTION, SOLUTION INTRAMUSCULAR; INTRAVENOUS at 14:26:00

## 2025-01-16 RX ADMIN — SODIUM CHLORIDE, SODIUM LACTATE, POTASSIUM CHLORIDE, CALCIUM CHLORIDE: 600; 310; 30; 20 INJECTION, SOLUTION INTRAVENOUS at 13:37:00

## 2025-01-16 RX ADMIN — METOCLOPRAMIDE HYDROCHLORIDE 10 MG: 5 INJECTION INTRAMUSCULAR; INTRAVENOUS at 13:41:00

## 2025-01-16 RX ADMIN — CITRIC ACID/SODIUM CITRATE 30 ML: 334-500MG SOLUTION, ORAL ORAL at 12:44:00

## 2025-01-16 RX ADMIN — ONDANSETRON 4 MG: 2 INJECTION INTRAMUSCULAR; INTRAVENOUS at 14:25:00

## 2025-01-16 RX ADMIN — CEFAZOLIN SODIUM 2 G: 1 INJECTION, POWDER, FOR SOLUTION INTRAMUSCULAR; INTRAVENOUS at 13:43:00

## 2025-01-16 RX ADMIN — SODIUM CHLORIDE, SODIUM LACTATE, POTASSIUM CHLORIDE, CALCIUM CHLORIDE: 600; 310; 30; 20 INJECTION, SOLUTION INTRAVENOUS at 14:03:00

## 2025-01-16 RX ADMIN — ROCURONIUM BROMIDE 45 MG: 10 INJECTION, SOLUTION INTRAVENOUS at 13:43:00

## 2025-01-16 RX ADMIN — LIDOCAINE HYDROCHLORIDE 50 MG: 10 INJECTION, SOLUTION EPIDURAL; INFILTRATION; INTRACAUDAL; PERINEURAL at 13:41:00

## 2025-01-16 NOTE — H&P
[facility]     Danuta Baca Patient Status:  Emergency    5/10/1955 MRN [id number]   Location [facility] Attending Adalgisa Menard MD   Hosp Day # 0 PCP PHYSICIAN NONSTAFF     Chief Complaint: Abdominal pain     Subjective:    History of Present Illness:     Danuta Baca is a 69 year old female with  h/o Hypertension, SVT, h/o APLPD. Patient reports having abdominal pain- upper since Tuesday. She has had constant upper abdominal pain, non radiating, no N, no V but has has poor appetite. No known fever, no chills.     History/Other:    Past Medical History:  Past Medical History:    Anticardiolipin antibody positive    Antiphospholipid antibody positive    Arrhythmia    Broken toe    little toe left foot    Dry eyes    Essential hypertension    Gastroesophageal reflux disease with esophagitis    High cholesterol    HTN (hypertension)    Hx of adenomatous colonic polyps    Hypercoagulable state (HCC)    Iridocyclitis    Left eye pain    Left shoulder pain     (normal spontaneous vaginal delivery) (HCC)    x2    Palpitations    Normal echo, stress and holter     Retinal holes    OS    Retinal vein occlusion (HCC)    SVT (supraventricular tachycardia) (HCC)    Varicose veins     Past Surgical History:   Past Surgical History:   Procedure Laterality Date    Cataract      Colonoscopy  2019    Egd N/A 2019    Procedure: ESOPHAGOGASTRODUODENOSCOPY, COLONOSCOPY, POSSIBLE BIOPSY, POSSIBLE POLYPECTOMY 86664, 70560;  Surgeon: Dave Sagastume MD;  [facility]: [facility]      Family History:   Family History   Problem Relation Age of Onset    Other (Alzheimer's) Father     Other (htn) Father     Glaucoma Mother     Other (skin cancer) Mother     Heart Attack Maternal Grandfather     Thyroid Disorder Sister     Other (severe hypertension) Brother     Arrhythmia Other      Social History:    reports that she has never smoked. She has  never been exposed to tobacco smoke. She has never used smokeless tobacco. She reports current alcohol use. She reports that she does not use drugs.     Allergies: Allergies[1]    Medications:  Medications Ordered Prior to Encounter[2]    Review of Systems:   A comprehensive review of systems was completed.    Pertinent positives and negatives noted in the HPI.    Objective:   Physical Exam:    /66   Pulse 114   Temp 97.8 °F (36.6 °C) (Temporal)   Resp 18   Ht 5' 7\" (1.702 m)   Wt 178 lb (80.7 kg)   SpO2 96%   BMI 27.88 kg/m²   General: No acute distress, Alert  Respiratory: No rhonchi, no wheezes  Cardiovascular: S1, S2. Regular rate and rhythm  Abdomen: Soft, Non-tender, non-distended, positive bowel sounds  Neuro: No new focal deficits  Extremities: No edema      Results:    Labs:      Labs Last 24 Hours:    Recent Labs   Lab 01/16/25  0937   RBC 4.17   HGB 12.5   HCT 35.4   MCV 84.9   MCH 30.0   MCHC 35.3   RDW 13.2   NEPRELIM 24.97*   WBC 29.1*   .0       Recent Labs   Lab 01/16/25  0937   *   BUN 12   CREATSERUM 0.85   EGFRCR 75   CA 9.4   ALB 3.9   *   K 3.6   CL 94*   CO2 27.0   ALKPHO 68   AST 19   ALT 19   BILT 1.2*   TP 6.4       Estimated Glomerular Filtration Rate: 75.2 mL/min/1.73m2 (by CKD-EPI based on SCr of 0.85 mg/dL).    Lab Results   Component Value Date    INR 0.94 08/01/2024       No results for input(s): \"TROP\", \"TROPHS\", \"CK\" in the last 168 hours.    No results for input(s): \"TROP\", \"PBNP\" in the last 168 hours.    No results for input(s): \"PCT\" in the last 168 hours.    Imaging: Imaging data reviewed in Epic.    Assessment & Plan:      #Acute hayder  IV Abx  IVF  NPO  Plan for CCY  Repeat labs in AM  #Hypertension  Continue home meds   Add pRN  # h/o SVT  Tele  #h/o APLPD   # R inguinal LN, ? Necrotic  US          Plan of care discussed with pt    Manda Rodriguez MD    Supplementary Documentation:     The 21st Century Cures Act makes medical notes like these  available to patients in the interest of transparency. Please be advised this is a medical document. Medical documents are intended to carry relevant information, facts as evident, and the clinical opinion of the practitioner. The medical note is intended as peer to peer communication and may appear blunt or direct. It is written in medical language and may contain abbreviations or verbiage that are unfamiliar.               **Certification      PHYSICIAN Certification of Need for Inpatient Hospitalization - Initial Certification    Patient will require inpatient services that will reasonably be expected to span two midnight's based on the clinical documentation in H+P.   Based on patients current state of illness, I anticipate that, after discharge, patient will require TBD.       Contacted by PACU, pt with hypotension- s/p 3 L bolus total. Will continue IVF with LR, Lactic ordered  If SBP< 100 may need transfer to ICU               [1]   Allergies  Allergen Reactions    Amoxicillin HIVES    Amoxil     Cefaclor UNKNOWN    Ciprofloxacin     Pcns [Penicillins]    [2]   No current facility-administered medications on file prior to encounter.     Current Outpatient Medications on File Prior to Encounter   Medication Sig Dispense Refill    ALPRAZolam 0.25 MG Oral Tab Take 1 tablet (0.25 mg total) by mouth 3 (three) times daily as needed for Anxiety. 20 tablet 0    dilTIAZem ER (CARTIA XT) 240 MG Oral Capsule SR 24 Hr Take 1 capsule (240 mg total) by mouth daily. 90 capsule 3    losartan-hydroCHLOROthiazide 50-12.5 MG Oral Tab Take 1 tablet by mouth daily.      Multiple Vitamins-Minerals (ONE A DAY WOMEN 50 PLUS OR) Take 1 tablet by mouth daily.      NON FORMULARY Take 1 capsule by mouth every evening. EyePromise EZ Tears      NON FORMULARY Take 1 capsule by mouth every morning. EyePromise Restore      Calcium Carbonate-Vitamin D (CALCIUM-D OR) Take 1 tablet by mouth daily.      valACYclovir 1 G Oral Tab Take 1 tablet  (1,000 mg total) by mouth daily. 90 tablet 3    Fluocinolone Acetonide 0.01 % Otic Oil 1 drop by Each ear route once a week. As needed  4

## 2025-01-16 NOTE — ANESTHESIA PREPROCEDURE EVALUATION
PRE-OP EVALUATION    Patient Name: Danuta Baca    Admit Diagnosis: No admission diagnoses are documented for this encounter.    Pre-op Diagnosis: Cholecystitis [K81.9]    LAPAROSCOPIC CHOLECYSTECTOMY WITH INTRAOPERATIVE CHOLANGIOGRAM AND INDOCYANINEGREEN    Anesthesia Procedure: LAPAROSCOPIC CHOLECYSTECTOMY WITH INTRAOPERATIVE CHOLANGIOGRAM AND INDOCYANINEGREEN    Surgeons and Role:     * Adalgisa Menard MD - Primary    Pre-op vitals reviewed.  Temp: 97.8 °F (36.6 °C)  Pulse: 105  Resp: 20  BP: 127/68  SpO2: 96 %  Body mass index is 27.88 kg/m².    Current medications reviewed.  Hospital Medications:   ondansetron (Zofran) 4 MG/2ML injection 4 mg  4 mg Intravenous Once    [COMPLETED] sodium chloride 0.9 % IV bolus 1,000 mL  1,000 mL Intravenous Once    [COMPLETED] iopamidol 76% (ISOVUE-370) injection for power injector  100 mL Intravenous ONCE PRN    meropenem (Merrem) 1 g in sodium chloride 0.9% 100 mL IVPB-MBP  1 g Intravenous Once       Outpatient Medications:   Prescriptions Prior to Admission[1]    Allergies: Amoxicillin, Amoxil, Cefaclor, Ciprofloxacin, and Pcns [penicillins]      Anesthesia Evaluation    Patient summary reviewed.    Anesthetic Complications  (-) history of anesthetic complications         GI/Hepatic/Renal                (+) liver disease                 Cardiovascular  Comment: 8/2024 cardiac stress:    IMPRESSION:  1. Normal left ventricular systolic function.   2. Normal perfusion.  3. Normal graded exercise treadmill study.      This test was not performed for preoperative evaluation on a low risk surgical patient.  This test was not performed as routine testing after percutaneous coronary intervention.  This test was not performed in an asymptomatic patient.          ECG reviewed.            (+) hypertension   (+) hyperlipidemia               (+) dysrhythmias and SVT                  Endo/Other    Negative endo/other ROS.                              Pulmonary    Negative pulmonary  ROS.                       Neuro/Psych    Negative neuro/psych ROS.                                  Past Surgical History:   Procedure Laterality Date    Cataract      Colonoscopy  05/13/2019    Egd N/A 5/13/2019    Procedure: ESOPHAGOGASTRODUODENOSCOPY, COLONOSCOPY, POSSIBLE BIOPSY, POSSIBLE POLYPECTOMY 78853, 08480;  Surgeon: Dave Sagastume MD;  [place] [facility]     Social History     Socioeconomic History    Marital status:    Tobacco Use    Smoking status: Never     Passive exposure: Never    Smokeless tobacco: Never   Vaping Use    Vaping status: Never Used   Substance and Sexual Activity    Alcohol use: Yes     Alcohol/week: 0.0 standard drinks of alcohol     Comment: On a weekend once in awhile    Drug use: No   Other Topics Concern    Caffeine Concern Yes     Comment: coffee     Stress Concern No    Weight Concern No    Special Diet Yes     Comment: No white flour    Exercise Yes     Comment: walk- 30 min daily     Seat Belt Yes     History   Drug Use No     Available pre-op labs reviewed.  Lab Results   Component Value Date    WBC 29.1 (H) 01/16/2025    RBC 4.17 01/16/2025    HGB 12.5 01/16/2025    HCT 35.4 01/16/2025    MCV 84.9 01/16/2025    MCH 30.0 01/16/2025    MCHC 35.3 01/16/2025    RDW 13.2 01/16/2025    .0 01/16/2025     Lab Results   Component Value Date     (L) 01/16/2025    K 3.6 01/16/2025    CL 94 (L) 01/16/2025    CO2 27.0 01/16/2025    BUN 12 01/16/2025    CREATSERUM 0.85 01/16/2025     (H) 01/16/2025    CA 9.4 01/16/2025            Airway      Mallampati: II  Mouth opening: >3 FB  TM distance: > 6 cm  Neck ROM: full Cardiovascular      Rhythm: regular  Rate: normal     Dental  Comment: Pt reports multiple permanent dental prosthetics           Pulmonary      Breath sounds clear to auscultation bilaterally.               Other findings              ASA: 2   Plan: general   Patient does not meet NPO guidelines.        Comment: Pt is not NPO.   Took solids 07:00.  However, case is urgent and must proceed per surgeon.  I discussed risk of aspiration with pt, including pneumonia and death.  We discussed that dental prosthetics are not as strong as healthy, native teeth and that airway manipulation and instrumentation carry with them a risk of damage to dental prosthetics as well as potential injury to native teeth.  All anesthetic related questions were addressed.  Pt expressed understanding of and agreement with the anesthetic plan.          Plan/risks discussed with: patient                Present on Admission:  **None**             [1] (Not in a hospital admission)

## 2025-01-16 NOTE — BRIEF OP NOTE
Pre-Operative Diagnosis: Cholecystitis [K81.9]     Post-Operative Diagnosis: Cholecystitis [K81.9]      Procedure Performed:   LAPAROSCOPIC CHOLECYSTECTOMY WITH INTRAOPERATIVE CHOLANGIOGRAM, UMBILICAL HERNIA REPAIR    Surgeons and Role:     * Nolberto Navarro MD - Primary    Assistant(s):  Surgical Assistant.: Edinson Cox RSA     Surgical Findings: see dictation     Specimen: GB     Estimated Blood Loss: Blood Output: 50 mL (1/16/2025  2:34 PM)      Dictation Number:  NA    Nolberto Navarro MD  1/16/2025  2:42 PM

## 2025-01-16 NOTE — DISCHARGE INSTRUCTIONS
Home Care Instructions  Cholecystectomy      MEDICATIONS  For post-operative pain control the medications are usually Norco or Tylenol #3.  These are narcotics and are best taken by starting with one tablet and repeating every four to six hours as needed.  If the patient does not feel they need the narcotics they shouldn’t take them.  If the pain is severe the patient may take up to two pills every four hours.  If a minor supplement is necessary in addition to the narcotics do not overlap with Tylenol (any product with acetaminophen) as both Norco and Tylenol #3 contain Tylenol.  Please supplement with Advil (ibuprofen) or Motrin.  Please ask your surgeon before resuming blood thinners such as aspirin, Plavix or Coumadin.  All other home medications may be resumed as scheduled.  With severe cholecystitis, antibiotics will also be prescribed.  With antibiotics, please watch for rash, facial swelling or severe diarrhea.    DIET  The patient may resume a general diet immediately.  This is not a good time to eat excessively.  The patient should eat in moderation and stick with foods the patient feels are easy to digest.  Avoid high fat foods in the immediate post-operative time period as this may still cause some problems.  The patient may eat anything in small amounts but foods rich in dairy products, fatty foods or fried foods may cause an upset stomach for up to six weeks after surgery.  There should be no alcohol consumption in the immediate recovery time period or within six hours of taking narcotics.    WOUND CARE  The top dressing may be removed the day after surgery.  This includes the gauze, tape and band-aids if they are present.  Do not remove the steri-strips or butterfly tapes that are white and adherent to the skin.  The steri-strips will eventually peel up at the ends and at this point they may be removed.  This is usually seven to ten days after surgery.  The patient may shower the day after surgery.   There is no need to cover the incisions, and all top gauze type dressing should be removed prior to showering.  Soap can get on the wounds but do not scrub over the wounds.  No hair dye or chemicals of any kind should get in the wounds.  Avoid tub baths, swimming or sitting in a hot tub for two weeks.  If visible sutures or staples are present they will be removed in the office by the surgeon or nurse.  Most wounds will be closed with dissolving suture underneath the skin.  These sutures will dissolve on their own.  If a drain is present make sure the patient receives drain care instructions from the nurse prior to discharge.  Most patients will not have a drain.    ACTIVITY  Every day the patient should be up, showered and dressed.  Each day the patient should be up and around the house.  The patient should not lie in bed and should not stay in pajamas.  We count on the patient being up, coughing, walking and deep breathing to avoid pneumonia and blood clots in the legs.  Once a day the patient should get out of the house and go shopping, go to the mall, the [employer] store, the [employer] or a restaurant.  The patient may ride in a car but should not drive the car for at least one week.  Patients should be off narcotics for at least 8 hours prior to being a .  The average time off work is 10 to 14 days; most adults will be seeing the surgeon prior to returning to work.  Students will return to school within 1-5 days after discharge from the hospital but will be off gym, sports, and indoors for recess for one month.  Patients may go up and down stairs and lift up to five pounds but no bending, pushing or pulling.  Nothing called work or exercise until the follow up visit.  No ‘stair-master’, power walking, jogging or workouts until the follow up visit.  Patients should seek further activity limits at the time of their appointment.    APPOINTMENT  Please call our office for an appointment within five to ten days of  discharge.  Any fever greater than 100.5, chills, nausea, vomiting, or severe diarrhea please call our office.  If the wound turns red, hot, swollen, becomes increasingly painful, or drains pus call us immediately at [phone].  For life threatening emergencies call 911.  For non-emergent care please call our office after 8:30 a.m. Monday through Friday.  The number listed above is our office number; our phone automatically switches to our answering service if we are not there.  Please do not use the emergency room for non-urgent care.    Thank you for entrusting us with your care.  [facility]--General Surgery

## 2025-01-16 NOTE — ED INITIAL ASSESSMENT (HPI)
Patient states right upper quadrant pain for 9 days  Dark urine, Denies any nausea, emesis or diarrhea  Patient states had tachycardia last night 120-140's for hours then subsided.  Denies any chest pain or SOB    Was taking NSAIDS- ortho told her to stop

## 2025-01-16 NOTE — ANESTHESIA PROCEDURE NOTES
Airway  Date/Time: 1/16/2025 1:42 PM  Urgency: elective    Airway not difficult    General Information and Staff    Patient location during procedure: OR  Anesthesiologist: Sandip Davison MD  Performed: anesthesiologist   Performed by: Sandip Davison MD  Authorized by: Sandip Davison MD      Indications and Patient Condition  Indications for airway management: anesthesia  Sedation level: deep  Preoxygenated: yes  Patient position: sniffing  Mask difficulty assessment: 0 - not attempted    Final Airway Details  Final airway type: endotracheal airway      Successful airway: ETT  Cuffed: yes   Successful intubation technique: direct laryngoscopy  Facilitating devices/methods: intubating stylet, rapid sequence intubation and cricoid pressure  Endotracheal tube insertion site: oral  Blade: Ashleigh  Blade size: #3  ETT size (mm): 7.0    Cormack-Lehane Classification: grade I - full view of glottis  Placement verified by: capnometry   Measured from: teeth  ETT to teeth (cm): 22  Number of attempts at approach: 1  Number of other approaches attempted: 0    Additional Comments  Easy intubation.  No evidence of facial, dental, or oral trauma.    Sandip Davison MD  [facility]

## 2025-01-16 NOTE — CONSULTS
[facility]  Report of Consultation    Danuta Baca Patient Status:  Emergency    5/10/1955 MRN [id number]   Location [facility] PRE OP HOLDING Attending Adalgisa Menard MD   Hosp Day # 0 PCP PHYSICIAN NONSTAFF     Date of service:      2025    Requesting Physician:  Dr Aleta ARCE    Reason for Consultation:  Right upper quadrant abdominal pain    History of Present Illness:  Danuta Baca is a a(n) 69 year old female    69-year-old female who is presenting to the emergency department with complaints of abdominal pain.  The patient reports that the abdominal pain developed approximately 48 hours ago and has been progressively worsening.  The pain is primarily in the right upper quadrant with radiation to the right back.  The patient reports that over the past 6 weeks she has been taking approximately 6 tablets of 200 mg of Advil per day  For right knee pain.  4 days ago she discontinued Advil and was placed on meloxicam 15 mg/day by her orthopedic surgeon.  The patient last took meloxicam 2 days ago on Tuesday at 6:30 AM.    The patient reports that over the past 2 days she has been experiencing progressively worsening anorexia and nausea.  The pain is primarily in the right upper quadrant with radiation to the right back.    Past medical/surgical history-hypertension, hypercholesterolemia, colonic polyps, colonoscopy, and EGD in 2019, varicose veins  Anticoagulated-patient is not anticoagulated      History:  Past Medical History:    Anticardiolipin antibody positive    Antiphospholipid antibody positive    Arrhythmia    Broken toe    little toe left foot    Dry eyes    Essential hypertension    Gastroesophageal reflux disease with esophagitis    High cholesterol    HTN (hypertension)    Hx of adenomatous colonic polyps    Hypercoagulable state (HCC)    Iridocyclitis    Left eye pain    Left shoulder pain     (normal spontaneous vaginal delivery) (HCC)    x2    Palpitations     Normal echo, stress and holter 2015    Retinal holes    OS    Retinal vein occlusion (HCC)    SVT (supraventricular tachycardia) (HCC)    Varicose veins     Past Surgical History:   Procedure Laterality Date    Cataract      Colonoscopy  05/13/2019    Egd N/A 5/13/2019    Procedure: ESOPHAGOGASTRODUODENOSCOPY, COLONOSCOPY, POSSIBLE BIOPSY, POSSIBLE POLYPECTOMY 18611, 23519;  Surgeon: Dave Sagastume MD;  Location: [facility], [facility]     Family History   Problem Relation Age of Onset    Other (Alzheimer's) Father     Other (htn) Father     Glaucoma Mother     Other (skin cancer) Mother     Heart Attack Maternal Grandfather     Thyroid Disorder Sister     Other (severe hypertension) Brother     Arrhythmia Other       reports that she has never smoked. She has never been exposed to tobacco smoke. She has never used smokeless tobacco. She reports current alcohol use. She reports that she does not use drugs.    Allergies:  Allergies[1]    Medications:    Current Facility-Administered Medications:     [Transfer Hold] ondansetron (Zofran) 4 MG/2ML injection 4 mg, 4 mg, Intravenous, Once    Review of Systems:    Allergic/Immuno:  Review of patient's allergies performed.  Constitutional:  Negative for decreased activity, n fever, irritability and lethargy, n unintentional weight loss  Endocrine:  Negative for abnormal sleep patterns, increased activity, polydipsia and polyphagia  HEENT:  Negative for hearing changes,  Nasal discharge  Eyes:  Negative for eye discharge, visual disturbance   Cardiovascular:  Negative for cool extremity and irregular heartbeat/palpitations  Respiratory:  Negative for cough, dyspnea and wheezing, SOB  Gastrointestinal:  y  abdominal pain,  y anorexia, y nausea, n emesis, n diarrhea, n constipation, n hematochezia  Genitourinary:  Negative for dysuria and hematuria  Hema/Lymph:  Negative for easy bleeding and easy bruising  Integumentary:  Negative for pruritus and rash, changing  pigmented moles, lesions  Musculoskeletal:  Negative for bone/joint symptoms, negative for muscle aches or cramping  Neurological:  Negative for gait disturbance, headaches  Psychiatric:  Negative for inappropriate interaction and psychiatric symptoms      Physical Exam:  Blood pressure 131/66, pulse 114, temperature 97.8 °F (36.6 °C), temperature source Temporal, resp. rate 18, height 67\", weight 178 lb (80.7 kg), SpO2 96%, not currently breastfeeding.    General:WDWN, in no acute distress   HEENT: Exam is unremarkable.  Without scleral icterus.  Mucous membranes are moist.  Oropharynx is clear.  Neck:  No JVD. Supple.   Lungs: Clear to auscultation bilaterally.  Cardiac: Regular rate and rhythm. No murmur.  Abdomen: Soft, exquisitely tender to deep palpation in the right upper quadrant, positive voluntary guarding, no percussion tenderness, rebound or involuntary guarding no peritoneal signs.     Extremities:  No lower extremity edema noted.  Without clubbing or cyanosis.    Skin: Normal texture and turgor.  Neurologic: Cranial nerves are grossly intact.  Alert and oriented x 3.  No focal neurologic deficit.    Laboratory Data:  Recent Labs   Lab 01/16/25  0937   RBC 4.17   HGB 12.5   HCT 35.4   MCV 84.9   MCH 30.0   MCHC 35.3   RDW 13.2   NEPRELIM 24.97*   WBC 29.1*   .0     Recent Labs   Lab 01/16/25  0937   *   BUN 12   CREATSERUM 0.85   CA 9.4   ALB 3.9   *   K 3.6   CL 94*   CO2 27.0   ALKPHO 68   AST 19   ALT 19   BILT 1.2*   TP 6.4         Assessment/Plan:     69-year-old female who is being evaluated in the emergency department for severe right upper quadrant abdominal pain radiating to the back  Workup in the ED reveals glucose 128, sodium 131, chloride 94, total bilirubin 1.2, alkaline phosphatase, SGOT, SGPT within normal limits, leukocytosis of 29.1, hemoglobin 12.5, platelet count 166 UA negative      CT scan of the abdomen and pelvis was performed there is gallbladder wall  thickening with pericholecystic fluid and cholelithiasis noted.  Colonic diverticulosis without diverticulitis.  Some wall thickening of the ascending and transverse colon are noted and this may be related to pericolonic inflammation.  Small fat-containing umbilical hernia seen.  Possible subcentimeter cyst, seroma or lymph node noted.  Treatment options were reviewed in detail with Danuta.  We did discuss laparoscopic cholecystectomy with intraoperative cholangiogram for acute cholecystitis, cholelithiasis.  At this time Heidi does wish to proceed with surgery due to ongoing symptoms of severe abdominal pain.  We did discuss the possibility of a positive cholangiogram which would necessitate further intervention for choledocholithiasis by gastroenterology service.  Mary has no further questions at this time and wishes to proceed with surgery today as discussed      Discussed:   The potential treatment options were discussed with the patient.  The potential risks, benefits, outcomes/recovery and alternatives to any proposed surgery were also fully reviewed with the patient. Any proposed surgical procedure was described in detail.  The patient verbalizes understanding.  All questions from the patient were discussed in detail to the patient's satisfaction.  No other questions were presented at this time.  Incidental abnormalities found on serology or imaging will be addressed by the patient's PCP or other services as appropriate.    Imaging was reviewed independently and with radiologist if available.        Thank you,   Adalgisa Menard MD      Please note that this report has been produced using speech recognition software and may contain errors related to that system including but not limited to errors in grammar, punctuation and spelling as well as words and phrases that possibly may have been recognized inappropriately.  If there are any questions or concerns please contact the dictating provider for  clarification.  The 21st Century Cures Act makes medical notes like these available to patients in the interest of trans parency. Please be advised this is a medical document. Medical documents are intended to carry relevant information, facts as evident, and the clinical opinion of the practitioner. The medical note is intended as peer to peer communication and may appear blunt or direct. It is written in medical language and may contain abbreviations or verbiage that are unfamiliar.  If there are any questions or concerns please contact the dictating provider for clarification.             [1]   Allergies  Allergen Reactions    Amoxicillin HIVES    Amoxil     Cefaclor UNKNOWN    Ciprofloxacin     Pcns [Penicillins]

## 2025-01-16 NOTE — ED QUICK NOTES
Blood cultures ordered after antibiotics were ordered and started. Only Approx 15ml of abx infused prior to blood draw. MD del toro.

## 2025-01-16 NOTE — ED INITIAL ASSESSMENT (HPI)
Pt arrives to ED for evaluation of abdominal pain, pt points to rights side/center. Pt states she was taking Meloxicam, which she started last Saturday. Pt state prior to Meloxicam she was taking 6 Tylenol and 6 Advil 6 everyday for the past 6 weeks or so. Pt also c/o rapid HR and dark urine. Pt also c/o of poor appetite.

## 2025-01-16 NOTE — OPERATIVE REPORT
Date of operation 1/16/2025    Preoperative diagnosis:  1.  Acute cholecystitis    Operations performed:  1.  Laparoscopic cholecystectomy  2.  Intraoperative cholangiogram  3.  Umbilical hernia repair    Postoperative diagnosis:  1.  Same    Operating Surgeon:  1.  Nolberto Navarro MD    Assistant:  1.Surgical Assistant.: Edinson Cox RSA     Indications:  69-year-old female who was diagnosed with acute cholecystitis.  She presents for cholecystectomy and intraoperative cholangiogram.    Details of the operation:  Patient was brought to the operating room laid supine on the operating table.  General tracheal anesthesia was induced without complications.  Operation points padded appropriate.  The arms were tucked.  The abdomen was clipped prepped and draped in the standard center manner.  Timeout is completed verify the patient's name, she to be performed at the administration of antibiotics.  Everybody in the room was in agreement.  Veress needle was introduced at Mansfield's point and pneumoperitoneum was established.  Additional working ports were placed as follows: Three 5 mm right upper quadrant ports and one supraumbilical 12 mm port.  Patient repositioned into reverse Trendelenburg and attention directed towards the right upper quadrant.  The gallbladder was decompressed with a needle.  It was retracted cephalad.  Dissection identified the cystic duct and cystic artery.  A critical view of safety was obtained.  The cystic artery was doubly clipped and divided.  The cystic duct was clipped proximally and a ductotomy was made anteriorly.  Cholangiogram catheter was introduced and a cholangiogram performed.  There was a small filling defect 2 to 3 mm in size in the distal common bile duct which was flushed easily.  Completion cholangiogram showed no filling defects and anatomy was clear, contrast emptied into duodenum readily.  This concluded the cholangiogram and the cystic duct was doubly clipped and divided.  The  remainder of the gallbladder attachments were taken with electrocautery per usual.  It was extracted through a bag.  There was a 2 x 2 cm umbilical hernia through which I extracted the specimen.  The hernia sac was dissected.  I then closed the hernia primarily using interrupted 0 Vicryl suture.  Abdomen was reinsufflated, hemostasis was assured.  Ports were withdrawn.  Skin subcutaneous tissue were irrigated and skin approximated subcuticular manner.  I was present for the entire case.    Nolberto Navarro MD  [facility] Oncology  System Medical Director of Surgical Services  [facility]

## 2025-01-16 NOTE — ED PROVIDER NOTES
Patient Seen in: Immediate Care [facility]      History     Chief Complaint   Patient presents with    Abdominal Pain     Stated Complaint: abdomen pain    Subjective:   69-year-old female presents to immediate care for abdominal pain.  Patient states over the last 6 to 7 weeks she has had an increase use of ibuprofen and Tylenol, last week she started using meloxicam daily and stopped to the others.  States 3 days ago she started with some right upper quadrant pain was advised to stop the pain medications.  This morning around 2 AM she woke with dark urine and a heart rate of 140.  She continues to have right upper quadrant pain.  Denies any nausea vomiting diarrhea              Objective:     Past Medical History:    Anticardiolipin antibody positive    Antiphospholipid antibody positive    Arrhythmia    Broken toe    little toe left foot    Dry eyes    Essential hypertension    Gastroesophageal reflux disease with esophagitis    High cholesterol    HTN (hypertension)    Hx of adenomatous colonic polyps    Hypercoagulable state (HCC)    Iridocyclitis    Left eye pain    Left shoulder pain     (normal spontaneous vaginal delivery) (HCC)    x2    Palpitations    Normal echo, stress and holter     Retinal holes    OS    Retinal vein occlusion (HCC)    SVT (supraventricular tachycardia) (HCC)    Varicose veins              Past Surgical History:   Procedure Laterality Date    Cataract      Colonoscopy  2019    Egd N/A 2019    Procedure: ESOPHAGOGASTRODUODENOSCOPY, COLONOSCOPY, POSSIBLE BIOPSY, POSSIBLE POLYPECTOMY 40373, 60782;  Surgeon: Dave Sagastume MD;  [facility]                Social History     Socioeconomic History    Marital status:    Tobacco Use    Smoking status: Never     Passive exposure: Never    Smokeless tobacco: Never   Vaping Use    Vaping status: Never Used   Substance and Sexual Activity    Alcohol use: Yes     Alcohol/week: 0.0  standard drinks of alcohol     Comment: On a weekend once in awhile    Drug use: No   Other Topics Concern    Caffeine Concern Yes     Comment: coffee     Stress Concern No    Weight Concern No    Special Diet Yes     Comment: No white flour    Exercise Yes     Comment: walk- 30 min daily     Seat Belt Yes     Social Drivers of Health     Food Insecurity: No Food Insecurity (8/2/2024)    Food Insecurity     Food Insecurity: Never true   Transportation Needs: No Transportation Needs (8/2/2024)    Transportation Needs     Lack of Transportation: No   Housing Stability: Low Risk  (8/2/2024)    Housing Stability     Housing Instability: No              Review of Systems   Constitutional: Negative.    Respiratory: Negative.     Cardiovascular:         Tachycardia   Gastrointestinal:  Positive for abdominal pain.   Skin: Negative.    Neurological: Negative.        Positive for stated complaint: abdomen pain  Other systems are as noted in HPI.  Constitutional and vital signs reviewed.      All other systems reviewed and negative except as noted above.    Physical Exam     ED Triage Vitals [01/16/25 0816]   /68   Pulse 115   Resp 18   Temp 97.9 °F (36.6 °C)   Temp src Oral   SpO2 97 %   O2 Device None (Room air)       Current Vitals:   Vital Signs  BP: 128/68  Pulse: 115  Resp: 18  Temp: 97.9 °F (36.6 °C)  Temp src: Oral    Oxygen Therapy  SpO2: 97 %  O2 Device: None (Room air)        Physical Exam  Constitutional:       Appearance: Normal appearance.   HENT:      Head: Normocephalic.      Nose: Nose normal.      Mouth/Throat:      Mouth: Mucous membranes are moist.   Eyes:      Extraocular Movements: Extraocular movements intact.      Conjunctiva/sclera: Conjunctivae normal.   Cardiovascular:      Rate and Rhythm: Normal rate.   Pulmonary:      Effort: Pulmonary effort is normal.      Breath sounds: Normal breath sounds.   Abdominal:      General: Abdomen is flat.      Palpations: Abdomen is rigid.   Musculoskeletal:          General: Normal range of motion.   Skin:     General: Skin is warm and dry.   Neurological:      Mental Status: She is alert.             ED Course   Labs Reviewed - No data to display                MDM      Medical Decision Making  69-year-old female presents for right upper quadrant pain.  Discussed with patient limitations of this immediate care and advise going to the emergency department for further evaluation        Disposition and Plan     Clinical Impression:  1. Abdominal pain, right upper quadrant         Disposition:  Ic to ed  1/16/2025  8:49 am    Follow-up:  Nonstaff, Physician                Medications Prescribed:  Discharge Medication List as of 1/16/2025  8:33 AM              Supplementary Documentation:

## 2025-01-16 NOTE — ED PROVIDER NOTES
Patient Seen in: [facility] Emergency Department      History     Chief Complaint   Patient presents with    Abdomen/Flank Pain     Stated Complaint: abdominal pain and tachycardia    Subjective:   HPI      69-year-old female presenting for evaluation of mid and right-sided abdominal pain.  Has been constant for about the last 2 days.  She reports she injured her knee recently and has been taking a lot of meloxicam for the last several days.  This pain started 2 days ago and has been constant.  Really has not been eating or drinking much of anything at all because of the pain.  1 loose bowel movement this morning but otherwise no vomiting or diarrhea.    Objective:     Past Medical History:    Anticardiolipin antibody positive    Antiphospholipid antibody positive    Arrhythmia    Broken toe    little toe left foot    Dry eyes    Essential hypertension    Gastroesophageal reflux disease with esophagitis    High cholesterol    HTN (hypertension)    Hx of adenomatous colonic polyps    Hypercoagulable state (HCC)    Iridocyclitis    Left eye pain    Left shoulder pain     (normal spontaneous vaginal delivery) (HCC)    x2    Palpitations    Normal echo, stress and holter     Retinal holes    OS    Retinal vein occlusion (HCC)    SVT (supraventricular tachycardia) (HCC)    Varicose veins              Past Surgical History:   Procedure Laterality Date    Cataract      Colonoscopy  2019    Egd N/A 2019    Procedure: ESOPHAGOGASTRODUODENOSCOPY, COLONOSCOPY, POSSIBLE BIOPSY, POSSIBLE POLYPECTOMY 53055, 49154;  Surgeon: Dave Sagastume MD;  Location: [facility], [facility]                Social History     Socioeconomic History    Marital status:    Tobacco Use    Smoking status: Never     Passive exposure: Never    Smokeless tobacco: Never   Vaping Use    Vaping status: Never Used   Substance and Sexual Activity    Alcohol use: Yes     Alcohol/week: 0.0 standard drinks of alcohol      Comment: On a weekend once in awhile    Drug use: No   Other Topics Concern    Caffeine Concern Yes     Comment: coffee     Stress Concern No    Weight Concern No    Special Diet Yes     Comment: No white flour    Exercise Yes     Comment: walk- 30 min daily     Seat Belt Yes     Social Drivers of Health     Food Insecurity: No Food Insecurity (8/2/2024)    Food Insecurity     Food Insecurity: Never true   Transportation Needs: No Transportation Needs (8/2/2024)    Transportation Needs     Lack of Transportation: No   Housing Stability: Low Risk  (8/2/2024)    Housing Stability     Housing Instability: No                  Physical Exam     ED Triage Vitals [01/16/25 0909]   /68   Pulse 119   Resp 16   Temp 97.8 °F (36.6 °C)   Temp src Temporal   SpO2 96 %   O2 Device        Current Vitals:   Vital Signs  BP: 131/66  Pulse: 114  Resp: 18  Temp: 97.8 °F (36.6 °C)  Temp src: Temporal  MAP (mmHg): 83    Oxygen Therapy  SpO2: 96 %        Physical Exam  Vitals and nursing note reviewed.   Constitutional:       Appearance: She is well-developed.   HENT:      Head: Normocephalic and atraumatic.   Eyes:      Conjunctiva/sclera: Conjunctivae normal.      Pupils: Pupils are equal, round, and reactive to light.   Cardiovascular:      Rate and Rhythm: Regular rhythm. Tachycardia present.      Heart sounds: Normal heart sounds.   Pulmonary:      Effort: Pulmonary effort is normal.      Breath sounds: Normal breath sounds.   Abdominal:      General: Bowel sounds are normal.      Palpations: Abdomen is soft.      Comments: Mild to moderate focal right upper quadrant tenderness.  Nondistended.  No rebound or guarding.   Musculoskeletal:         General: Normal range of motion.      Cervical back: Normal range of motion and neck supple.   Skin:     General: Skin is warm and dry.   Neurological:      Mental Status: She is alert and oriented to person, place, and time.             ED Course     Labs Reviewed   CBC WITH  DIFFERENTIAL WITH PLATELET - Abnormal; Notable for the following components:       Result Value    WBC 29.1 (*)     Neutrophil Absolute Prelim 24.97 (*)     All other components within normal limits   COMP METABOLIC PANEL (14) - Abnormal; Notable for the following components:    Glucose 128 (*)     Sodium 131 (*)     Chloride 94 (*)     Calculated Osmolality 273 (*)     Bilirubin, Total 1.2 (*)     All other components within normal limits   MANUAL DIFFERENTIAL - Abnormal; Notable for the following components:    Neutrophil Absolute Manual 25.61 (*)     Monocyte Absolute Manual 2.33 (*)     All other components within normal limits   LIPASE - Normal   URINALYSIS WITH CULTURE REFLEX   BLOOD CULTURE   BLOOD CULTURE     EKG    Rate, intervals and axes as noted on EKG Report.  Rate: 110  Rhythm: Sinus Rhythm  Reading: Tachycardia.  No ST segment or T wave changes.  No old immediately available for comparison.                CT ABDOMEN+PELVIS(CONTRAST ONLY)(CPT=74177)    Result Date: 1/16/2025  PROCEDURE:  CT ABDOMEN+PELVIS (CONTRAST ONLY) (CPT=74177)  COMPARISON:  None.  INDICATIONS:  abdominal pain and tachycardia  TECHNIQUE:  CT scanning was performed from the dome of the diaphragm to the pubic symphysis with non-ionic intravenous contrast material. Post contrast coronal MPR imaging was performed.  Dose reduction techniques were used. Dose information is transmitted to the ACR (American College of Radiology) NRDR (National Radiology Data Registry) which includes the Dose Index Registry.  PATIENT STATED HISTORY:(As transcribed by Technologist)  Abdominal pain and tachycardia   CONTRAST USED:  100cc of Isovue 370  FINDINGS:  LUNG BASES:  Dependent atelectasis bilaterally.  Small hiatal hernia.  Trace right-sided pleural effusion. LIVER:  Normal in shape and contour. BILIARY:  Gallbladder wall thickening with pericholecystic fluid.  Cholelithiasis.. SPLEEN:  Normal.  No enlargement or focal lesion. PANCREAS:  No  cheryl-pancreatic inflammatory stranding ADRENALS:  Normal.  No mass or enlargement.  KIDNEYS:  Kidneys are symmetrical in size without evidence of hydronephrosis.  Bilateral parapelvic cysts. BOWEL/MESENTERY:  Bowel is normal in caliber. No evidence of obstruction.  Colonic diverticulosis without evidence of diverticulitis.  There is wall thickening of the ascending and transverse portion of the colon.  Small fat containing umbilical hernia. PELVIS:  Bladder is unremarkable in appearance.  Calcified phleboliths in the pelvis.  No free pelvic fluid.   AORTA/VASCULAR:  Aorta is normal in caliber.  There is a hypodense lesion within the right inguinal region measuring 0.7 x 0.9 cm which may represent a small complex cyst/seroma but necrotic lymph node cannot be excluded.. BONES:  Degenerative changes of the lumbar spine.  Schmorl's node deformity of the superior plate of L4 vertebral body.  There is transitional vertebral body versus lumbarization of the S1 vertebral body.            CONCLUSION:  1. Cholelithiasis with acute cholecystitis.  There is prominent amount of pericholecystic fluid.  There is a prominent vessel noted traversing between the gallbladder and the liver. 2. Wall thickening of the ascending and transverse portion of colon which may represent secondary inflammation versus acute colitis. 3. Colonic diverticulosis. 4. Hypodense lesion within the right inguinal region measuring 0.7 x 0.9 cm which is indeterminate.  This could represent a small cyst or seroma but a necrotic lymph node cannot be completely excluded.  Consider ultrasound to further evaluate. .   LOCATION:  [facility]   Dictated by (CST): Maryana David MD on 1/16/2025 at 11:12 AM     Finalized by (CST): Maryana David MD on 1/16/2025 at 11:29 AM             MDM      69-year-old female with pretty constant mid and right upper quadrant abdominal pain for 2 days.  Nausea and her appetite has been down, really have not been eating or drinking  much.  She is quite tender there in the right upper quadrant.  No abdominal surgeries and certainly suspicious for gallbladder disease/pathology.  Differential would also include atypical appendicitis, colitis, diverticulitis.  Labs, CT ordered.  Will hydrate her.  She declines anything for pain.      Update at 12:05 PM.  Workup demonstrates leukocytosis to 29,000.  LFTs and lipase are normal.  She does still look uncomfortable although she still declines anything for pain.  Discussed with Dr. Laws general surgery who will come see her down here.  Discussed antibiotics as well as patient has hives to penicillins and cephalosporins as well as an unknown reaction to ciprofloxacin.  After discussion with pharmacy we will give her meropenem.        Past Medical History-hypertension    Differential diagnosis before testing included cholecystitis, pancreatitis, biliary colic, colitis, diverticulitis    Co-morbidities that add to the complexity of management include: None     Testing ordered during this visit included labs, CT    Radiographic images  I personally reviewed the radiographs and my individual interpretation shows acute cholecystitis  I also reviewed the official reports that showed acute cholecystitis      Discussion of management with hospitalist, general surgery            Disposition:    Admission  I have discussed with the patient the results of test, differential diagnosis, and treatment plan. They expressed clear understanding of these instructions and agrees to the plan provided.         Admission disposition: 1/16/2025 12:07 PM           Medical Decision Making      Disposition and Plan     Clinical Impression:  1. Acute cholecystitis         Disposition:  Admit  1/16/2025 12:07 pm    Follow-up:  No follow-up provider specified.        Medications Prescribed:  Current Discharge Medication List              Supplementary Documentation:         Hospital Problems       Present on Admission  Date  Reviewed: 1/16/2025            ICD-10-CM Noted POA    * (Principal) Acute cholecystitis K81.0 1/16/2025 Unknown

## 2025-01-16 NOTE — ANESTHESIA POSTPROCEDURE EVALUATION
[facility]    Danuta Baca Patient Status:  Emergency   Age/Gender 69 year old female MRN [id number]   Location [facility] POST ANESTHESIA CARE UNIT Attending Adalgisa Menard MD   Hosp Day # 0 PCP PHYSICIAN NONSTAFF       Anesthesia Post-op Note    LAPAROSCOPIC CHOLECYSTECTOMY WITH INTRAOPERATIVE CHOLANGIOGRAM, UMBILICAL HERNIA REPAIR    Procedure Summary       Date: 01/16/25 Room / Location:  MAIN OR  /  MAIN OR    Anesthesia Start: 1337 Anesthesia Stop: 1447    Procedure: LAPAROSCOPIC CHOLECYSTECTOMY WITH INTRAOPERATIVE CHOLANGIOGRAM, UMBILICAL HERNIA REPAIR (Abdomen) Diagnosis:       Cholecystitis      (Cholecystitis [K81.9])    Surgeons: Nolberto Navarro MD Anesthesiologist: Sandip Davison MD    Anesthesia Type: general ASA Status: 2            Anesthesia Type: general    Vitals Value Taken Time   /63 01/16/25 1449   Temp 99 °F (37.2 °C) 01/16/25 1449   Pulse 98 01/16/25 1449   Resp 18 01/16/25 1449   SpO2 97 % 01/16/25 1449           Patient Location: PACU    Anesthesia Type: general    Airway Patency: patent and extubated    Postop Pain Control: adequate    Mental Status: mildly sedated but able to meaningfully participate in the post-anesthesia evaluation    Nausea/Vomiting: none    Cardiopulmonary/Hydration status: stable euvolemic    Complications: no apparent anesthesia related complications    Postop vital signs: stable    Dental Exam: Unchanged from Preop    Patient to be discharged from PACU when criteria met.

## 2025-01-17 ENCOUNTER — APPOINTMENT (OUTPATIENT)
Dept: ULTRASOUND IMAGING | Facility: HOSPITAL | Age: 70
End: 2025-01-17
Attending: STUDENT IN AN ORGANIZED HEALTH CARE EDUCATION/TRAINING PROGRAM
Payer: MEDICARE

## 2025-01-17 LAB
ALBUMIN SERPL-MCNC: 3.3 G/DL (ref 3.2–4.8)
ALBUMIN/GLOB SERPL: 1.6 {RATIO} (ref 1–2)
ALP LIVER SERPL-CCNC: 121 U/L
ALT SERPL-CCNC: 143 U/L
ANION GAP SERPL CALC-SCNC: 8 MMOL/L (ref 0–18)
AST SERPL-CCNC: 110 U/L (ref ?–34)
BASOPHILS # BLD AUTO: 0.04 X10(3) UL (ref 0–0.2)
BASOPHILS NFR BLD AUTO: 0.2 %
BILIRUB SERPL-MCNC: 1.5 MG/DL (ref 0.2–1.1)
BUN BLD-MCNC: 13 MG/DL (ref 9–23)
CALCIUM BLD-MCNC: 8.9 MG/DL (ref 8.7–10.6)
CHLORIDE SERPL-SCNC: 100 MMOL/L (ref 98–112)
CO2 SERPL-SCNC: 28 MMOL/L (ref 21–32)
CREAT BLD-MCNC: 0.88 MG/DL
EGFRCR SERPLBLD CKD-EPI 2021: 72 ML/MIN/1.73M2 (ref 60–?)
EOSINOPHIL # BLD AUTO: 0.03 X10(3) UL (ref 0–0.7)
EOSINOPHIL NFR BLD AUTO: 0.1 %
ERYTHROCYTE [DISTWIDTH] IN BLOOD BY AUTOMATED COUNT: 13.8 %
GLOBULIN PLAS-MCNC: 2.1 G/DL (ref 2–3.5)
GLUCOSE BLD-MCNC: 102 MG/DL (ref 70–99)
HCT VFR BLD AUTO: 33.1 %
HGB BLD-MCNC: 11.1 G/DL
IMM GRANULOCYTES # BLD AUTO: 0.11 X10(3) UL (ref 0–1)
IMM GRANULOCYTES NFR BLD: 0.5 %
LYMPHOCYTES # BLD AUTO: 0.98 X10(3) UL (ref 1–4)
LYMPHOCYTES NFR BLD AUTO: 4.8 %
MCH RBC QN AUTO: 30.1 PG (ref 26–34)
MCHC RBC AUTO-ENTMCNC: 33.5 G/DL (ref 31–37)
MCV RBC AUTO: 89.7 FL
MONOCYTES # BLD AUTO: 0.86 X10(3) UL (ref 0.1–1)
MONOCYTES NFR BLD AUTO: 4.2 %
NEUTROPHILS # BLD AUTO: 18.23 X10 (3) UL (ref 1.5–7.7)
NEUTROPHILS # BLD AUTO: 18.23 X10(3) UL (ref 1.5–7.7)
NEUTROPHILS NFR BLD AUTO: 90.2 %
OSMOLALITY SERPL CALC.SUM OF ELEC: 282 MOSM/KG (ref 275–295)
PLATELET # BLD AUTO: 129 10(3)UL (ref 150–450)
PLATELETS.RETICULATED NFR BLD AUTO: 3.8 % (ref 0–7)
POTASSIUM SERPL-SCNC: 3.7 MMOL/L (ref 3.5–5.1)
PROT SERPL-MCNC: 5.4 G/DL (ref 5.7–8.2)
RBC # BLD AUTO: 3.69 X10(6)UL
SODIUM SERPL-SCNC: 136 MMOL/L (ref 136–145)
WBC # BLD AUTO: 20.3 X10(3) UL (ref 4–11)

## 2025-01-17 PROCEDURE — 76882 US LMTD JT/FCL EVL NVASC XTR: CPT | Performed by: STUDENT IN AN ORGANIZED HEALTH CARE EDUCATION/TRAINING PROGRAM

## 2025-01-17 PROCEDURE — 99233 SBSQ HOSP IP/OBS HIGH 50: CPT | Performed by: STUDENT IN AN ORGANIZED HEALTH CARE EDUCATION/TRAINING PROGRAM

## 2025-01-17 RX ORDER — VALACYCLOVIR HYDROCHLORIDE 1 G/1
1000 TABLET, FILM COATED ORAL DAILY
Status: DISCONTINUED | OUTPATIENT
Start: 2025-01-17 | End: 2025-01-18

## 2025-01-17 RX ORDER — ACETAMINOPHEN 500 MG
1000 TABLET ORAL EVERY 6 HOURS PRN
Qty: 100 TABLET | Refills: 0 | Status: SHIPPED | OUTPATIENT
Start: 2025-01-17

## 2025-01-17 NOTE — PLAN OF CARE
A&Ox4. VSS. RA. IS. . Denies chest pain and SOB.   GI: Abdomen soft, nondistended. Passing gas. Diarrhea present - testing for c dif ordered.   Denies nausea.   : Voids.   Pain controlled with PRN pain medications.   Up with standby assist.   Drains: None  Incisions: x3 abd lap sites with derma-bond, umbilical lap with guaze/tega-derm   Diet: Soft/low fiber diet   IVF running per order. All appropriate safety measures in place. All questions and concerns addressed.

## 2025-01-17 NOTE — PROGRESS NOTES
Surgical Oncology Inpatient Progress Note    Subjective:  VSS  Patient doing well from a post operative standpoint. Having bowel movements. Pain is well controlled.    Objective:  Temp:  [98 °F (36.7 °C)-99.3 °F (37.4 °C)] 98.1 °F (36.7 °C)  Pulse:  [] 97  Resp:  [14-22] 16  BP: ()/(42-68) 114/59  SpO2:  [93 %-98 %] 95 %    Intake/Output:      Intake/Output Summary (Last 24 hours) at 1/17/2025 1112  Last data filed at 1/17/2025 0909  Gross per 24 hour   Intake 4720 ml   Output 300 ml   Net 4420 ml       Wt Readings from Last 6 Encounters:   01/16/25 80.7 kg (178 lb)   08/02/24 78.8 kg (173 lb 11.6 oz)   08/01/24 80.4 kg (177 lb 4 oz)   05/16/23 84.6 kg (186 lb 6.4 oz)   05/11/23 86.4 kg (190 lb 6.4 oz)   12/12/22 86.9 kg (191 lb 8 oz)       Allergies:    Allergies[1]    Labs:  Recent Labs   Lab 01/16/25  0937 01/17/25  0530   RBC 4.17 3.69*   HGB 12.5 11.1*   HCT 35.4 33.1*   MCV 84.9 89.7   MCH 30.0 30.1   MCHC 35.3 33.5   RDW 13.2 13.8   NEPRELIM 24.97* 18.23*   WBC 29.1* 20.3*   .0 129.0*       Recent Labs   Lab 01/16/25  0937 01/17/25  0530   * 102*   BUN 12 13   CREATSERUM 0.85 0.88   CA 9.4 8.9   * 136   K 3.6 3.7   CL 94* 100   CO2 27.0 28.0         Physical Exam:  General: NAD  Lungs: CTA bilat  Heart: RRR, S1, S2  Abdomen: Incision site CDI. Anticipated tenderness for POD 1  Extremities: Warm, dry, no LE edema bilat  Neurological:  AAOx3, MAEW    Assessment/Plan:  POD 0 -1 from Laparoscopic cholecystectomy with intraoperative cholangiogram, umbilical hernia repair. Doing well and progressing as anticipated. Bilirubin slightly elevated 1.5, will trend and monitor.     1- Diet: ADAT  2- Pain management: oxy, tylenol  3- PT/OT to evaluate and treat  4- Appreciate recs from medical team  5- Incentive spirometry and pulmonary toilet  6- Dispo: prepare for discharge today     JESS Maurer  Surgical Oncology  [facility]  [email or web address]           [1]    Allergies  Allergen Reactions    Amoxicillin HIVES    Amoxil     Cefaclor UNKNOWN    Ciprofloxacin     Pcns [Penicillins]

## 2025-01-17 NOTE — PROGRESS NOTES
[facility]   part of [facility]     Hospitalist Progress Note     Danuta Baca Patient Status:  Observation    5/10/1955 MRN [id number]   Location [facility] 3NW-A Attending Adalgisa Menard MD   Hosp Day # 0 PCP PHYSICIAN NONSTAFF     Chief Complaint: Acute hayder     Subjective:     Patient  having palpitations     Objective:    Review of Systems:   A comprehensive review of systems was completed; pertinent positive and negatives stated in subjective.    Vital signs:  Temp:  [98 °F (36.7 °C)-99.3 °F (37.4 °C)] 98.3 °F (36.8 °C)  Pulse:  [] 101  Resp:  [14-22] 18  BP: ()/(42-63) 137/63  SpO2:  [93 %-98 %] 97 %    Physical Exam:    General: No acute distress  Respiratory: No wheezes, no rhonchi  Cardiovascular: S1, S2, regular rate and rhythm  Abdomen: Soft, non-distended, positive bowel sounds  Extremities: No edema      Diagnostic Data:    Labs:  Recent Labs   Lab 25  0937 25  0530   WBC 29.1* 20.3*   HGB 12.5 11.1*   MCV 84.9 89.7   .0 129.0*   BAND 6  --        Recent Labs   Lab 25  0937 25  0530   * 102*   BUN 12 13   CREATSERUM 0.85 0.88   CA 9.4 8.9   ALB 3.9 3.3   * 136   K 3.6 3.7   CL 94* 100   CO2 27.0 28.0   ALKPHO 68 121   AST 19 110*   ALT 19 143*   BILT 1.2* 1.5*   TP 6.4 5.4*       Estimated Glomerular Filtration Rate: 72.1 mL/min/1.73m2 (by CKD-EPI based on SCr of 0.88 mg/dL).    No results for input(s): \"TROP\", \"TROPHS\", \"CK\" in the last 168 hours.    No results for input(s): \"PTP\", \"INR\" in the last 168 hours.               Microbiology    No results found for this visit on 25.      Imaging: Reviewed in Epic.    Medications:    valACYclovir  1,000 mg Oral Daily    ondansetron  4 mg Intravenous Once    meropenem  500 mg Intravenous Q8H    dilTIAZem ER  240 mg Oral Daily    enoxaparin  40 mg Subcutaneous Daily    acetaminophen  1,000 mg Oral Q8H KAL       Assessment & Plan:      #Acute hayder s/p CCY 25  IV  Abx  IVF  NPO  #Hypertension  With hypotension overnight 1/16 s/p 3 L bolus- improved  Continue to hold ARB  # h/o SVT  Tele  Cardizem   #h/o HSV  Valtrex  #h/o APLPD   # R inguinal LN, ? Necrotic  US    Plan of care  Await normalization of BP   Repeat CBC in AM  Monitor Blood Cx  Hopefully DC in AM     Manda Rodriguez MD    Supplementary Documentation:     Quality:  DVT Mechanical Prophylaxis:   SCDs, Early ambuation  DVT Pharmacologic Prophylaxis   Medication    enoxaparin (Lovenox) 40 MG/0.4ML SUBQ injection 40 mg                Code Status: Not on file  Horn: No urinary catheter in place  Horn Duration (in days):   Central line:    BRAEDEN: 1/18/2025    Discharge is dependent on: clinical   At this point Ms. Baca is expected to be discharge to: home     The 21st Century Cures Act makes medical notes like these available to patients in the interest of transparency. Please be advised this is a medical document. Medical documents are intended to carry relevant information, facts as evident, and the clinical opinion of the practitioner. The medical note is intended as peer to peer communication and may appear blunt or direct. It is written in medical language and may contain abbreviations or verbiage that are unfamiliar.

## 2025-01-17 NOTE — PROGRESS NOTES
NURSING ADMISSION NOTE      Patient admitted via  bed.  Oriented to room.  Safety precautions initiated.  Bed in low position.  Call light in reach.    2 person skin check performed with EMMANUEL Israel. Lap sites x 3 skin glue, gauze and tegaderm dressing to umbilicus. No other skin issues noted.

## 2025-01-18 VITALS
OXYGEN SATURATION: 98 % | TEMPERATURE: 98 F | WEIGHT: 178 LBS | BODY MASS INDEX: 27.94 KG/M2 | HEART RATE: 85 BPM | RESPIRATION RATE: 18 BRPM | DIASTOLIC BLOOD PRESSURE: 69 MMHG | HEIGHT: 67 IN | SYSTOLIC BLOOD PRESSURE: 126 MMHG

## 2025-01-18 LAB
ALBUMIN SERPL-MCNC: 3.4 G/DL (ref 3.2–4.8)
ALP LIVER SERPL-CCNC: 107 U/L
ALT SERPL-CCNC: 72 U/L
ANION GAP SERPL CALC-SCNC: 6 MMOL/L (ref 0–18)
AST SERPL-CCNC: 23 U/L (ref ?–34)
BASOPHILS # BLD AUTO: 0.04 X10(3) UL (ref 0–0.2)
BASOPHILS NFR BLD AUTO: 0.3 %
BILIRUB DIRECT SERPL-MCNC: 0.3 MG/DL (ref ?–0.3)
BILIRUB SERPL-MCNC: 0.7 MG/DL (ref 0.2–1.1)
BUN BLD-MCNC: 14 MG/DL (ref 9–23)
CALCIUM BLD-MCNC: 8.9 MG/DL (ref 8.7–10.6)
CHLORIDE SERPL-SCNC: 101 MMOL/L (ref 98–112)
CO2 SERPL-SCNC: 29 MMOL/L (ref 21–32)
CREAT BLD-MCNC: 0.57 MG/DL
EGFRCR SERPLBLD CKD-EPI 2021: 100 ML/MIN/1.73M2 (ref 60–?)
EOSINOPHIL # BLD AUTO: 0.16 X10(3) UL (ref 0–0.7)
EOSINOPHIL NFR BLD AUTO: 1.2 %
ERYTHROCYTE [DISTWIDTH] IN BLOOD BY AUTOMATED COUNT: 13.6 %
GLUCOSE BLD-MCNC: 100 MG/DL (ref 70–99)
HCT VFR BLD AUTO: 32.8 %
HGB BLD-MCNC: 11.1 G/DL
IMM GRANULOCYTES # BLD AUTO: 0.09 X10(3) UL (ref 0–1)
IMM GRANULOCYTES NFR BLD: 0.7 %
LYMPHOCYTES # BLD AUTO: 0.97 X10(3) UL (ref 1–4)
LYMPHOCYTES NFR BLD AUTO: 7.5 %
MCH RBC QN AUTO: 29.9 PG (ref 26–34)
MCHC RBC AUTO-ENTMCNC: 33.8 G/DL (ref 31–37)
MCV RBC AUTO: 88.4 FL
MONOCYTES # BLD AUTO: 0.71 X10(3) UL (ref 0.1–1)
MONOCYTES NFR BLD AUTO: 5.5 %
NEUTROPHILS # BLD AUTO: 11.02 X10 (3) UL (ref 1.5–7.7)
NEUTROPHILS # BLD AUTO: 11.02 X10(3) UL (ref 1.5–7.7)
NEUTROPHILS NFR BLD AUTO: 84.8 %
OSMOLALITY SERPL CALC.SUM OF ELEC: 283 MOSM/KG (ref 275–295)
PLATELET # BLD AUTO: 145 10(3)UL (ref 150–450)
POTASSIUM SERPL-SCNC: 3.5 MMOL/L (ref 3.5–5.1)
PROT SERPL-MCNC: 5.7 G/DL (ref 5.7–8.2)
RBC # BLD AUTO: 3.71 X10(6)UL
SODIUM SERPL-SCNC: 136 MMOL/L (ref 136–145)
WBC # BLD AUTO: 13 X10(3) UL (ref 4–11)

## 2025-01-18 PROCEDURE — 99239 HOSP IP/OBS DSCHRG MGMT >30: CPT | Performed by: STUDENT IN AN ORGANIZED HEALTH CARE EDUCATION/TRAINING PROGRAM

## 2025-01-18 RX ORDER — ONDANSETRON 4 MG/1
4 TABLET, FILM COATED ORAL EVERY 8 HOURS PRN
Qty: 9 TABLET | Refills: 0 | Status: SHIPPED | OUTPATIENT
Start: 2025-01-18

## 2025-01-18 RX ORDER — OXYCODONE HYDROCHLORIDE 5 MG/1
5 TABLET ORAL EVERY 4 HOURS PRN
Qty: 15 TABLET | Refills: 0 | Status: SHIPPED | OUTPATIENT
Start: 2025-01-18

## 2025-01-18 NOTE — PROGRESS NOTES
A&Ox4. RA. VSS.   Denies nausea, SOB and chest pain. Pain controlled per MAR.   Cdiff rule out in place unable to collect stool sample.   Lap sites x3- cdi. Umbilical lap- gauze and tegaderm- cdi  Refused lovenox.   Safety measures in place, all questions and concerns addressed. Call light within reach.

## 2025-01-18 NOTE — PROGRESS NOTES
[facility]  Progress Note    Danuta Baca Patient Status:  Inpatient    5/10/1955 MRN [id number]   Location [facility] 3NW-A Attending Adalgisa Menard MD   Hosp Day # 2 PCP PHYSICIAN NONSTAFF     Subjective:  She is seen and examined resting in bed. She reports her abdominal pain is improved compared to yesterday. She reports tolerating diet.     Wbc 20.3--> 13.0.   Objective/Physical Exam:  /69 (BP Location: Left arm)   Pulse 93   Temp 98.2 °F (36.8 °C) (Oral)   Resp 19   Ht 67\"   Wt 178 lb (80.7 kg)   SpO2 97%   BMI 27.88 kg/m²     Intake/Output Summary (Last 24 hours) at 2025 1518  Last data filed at 2025 1512  Gross per 24 hour   Intake 500 ml   Output 1850 ml   Net -1350 ml         General: Awake, Alert,  Cooperative.  No apparent distress.  Pulm: no respiratory distress, no increased work of breathing  Abdomen:  Soft, non-distended,appropriate incisional tenderness , with no rebound or guarding.  No peritoneal signs.  Incision:  Clean, dry, intact without erythema.        Labs:  Lab Results   Component Value Date    WBC 13.0 2025    HGB 11.1 2025    HCT 32.8 2025    .0 2025      Lab Results   Component Value Date    INR 0.94 2024     Lab Results   Component Value Date     2025    K 3.5 2025     2025    CO2 29.0 2025    BUN 14 2025    CREATSERUM 0.57 2025     2025    CA 8.9 2025            Assessment:  Patient Active Problem List   Diagnosis    CRVO (central retinal vein occlusion) (HCC)    HTN (hypertension)    Herpes simplex infection of genitourinary system    High cholesterol    Age-related nuclear cataract of both eyes    Posterior vitreous detachment of both eyes    PCO (posterior capsular opacification), bilateral    Gastro-esophageal reflux disease without esophagitis    Osteopenia    SVT (supraventricular tachycardia) (HCC)    PAC (premature atrial  contraction)    Poorly-controlled hypertension    Junctional tachycardia (HCC)    Chest tightness    Acute cholecystitis    Calculus of gallbladder with acute cholecystitis and obstruction    Hyperbilirubinemia       POD 2 laparoscopic cholecystectomy     Plan:  Check hepatic panel to monitor T. Bilirubin.   Continue diet as tolerated  Discharge instructions discussed  Analgesics and antiemetics as needed  Encourage ambulation and up to chair  DVT prophylaxis with lovenox  If total bilirubin is stable or improved, okay for discharge home today from a general surgery standpoint. If total bilirubin increased, then recommend holding discharge for further evaluation.   Follow up with [facility] general surgery in 2 weeks, sooner if needed    Marcelina Perez PA-C  1/18/2025  3:18 PM    This note was initiated by Marcelina Perez PA-C.  The PA saw the patient in conjunction with me. The PA performed a history, exam, and developed the assessment and plan. I agree with the mentioned assessment and plan and have provided further documentation of my own, if necessary. Ok for discharge from my standpoint. Follow up in 2 weeks.     Robson Vences MD  [facility] General Surgery

## 2025-01-18 NOTE — PROGRESS NOTES
Pt received ok to discharge from sx at 1630. Discharge paperwork and education given to patient and daughter. IV removed and pt transported in wheelchair with belongings to [facility].

## 2025-01-18 NOTE — DISCHARGE SUMMARY
[place] HOSPITALIST  DISCHARGE SUMMARY     Danuta Baca Patient Status:  Inpatient    5/10/1955 MRN [id number]   Location [facility] 3NW-A Attending Adalgisa Menard MD   Hosp Day # 2 PCP PHYSICIAN NONSTAFF     Date of Admission: 2025  Date of Discharge:   2025    Discharge Disposition: EDW Emergency Room    Discharge Diagnosis:  #Acute hayder s/p CCY 25  IV Abx  IVF  NPO  #Hypertension  With hypotension overnight  s/p 3 L bolus- improved  Continue to hold ARB  # h/o SVT  Tele  Cardizem   #h/o HSV  Valtrex  #h/o APLPD   # R inguinal LN, ? Necrotic  US noted     History of Present Illness:   Danuta Baca is a 69 year old female with  h/o Hypertension, SVT, h/o APLPD. Patient reports having abdominal pain- upper since Tuesday. She has had constant upper abdominal pain, non radiating, no N, no V but has has poor appetite. No known fever, no chills.     Brief Synopsis:   Pt admitted for acute hayder s/p CCY. Had hypotension s/p 3 L IVF post op- DC home in stable condition.     Lace+ Score: 60  59-90 High Risk  29-58 Medium Risk  0-28   Low Risk       TCM Follow-Up Recommendation:  LACE > 58: High Risk of readmission after discharge from the hospital.      Procedures during hospitalization:   CCY     Incidental or significant findings and recommendations (brief descriptions):  no    Lab/Test results pending at Discharge:   no    Consultants:  Surgery     Discharge Medication List:     Discharge Medications        START taking these medications        Instructions Prescription details   acetaminophen 500 MG Tabs  Commonly known as: Tylenol Extra Strength      Take 2 tablets (1,000 mg total) by mouth every 6 (six) hours as needed for Pain.   Quantity: 100 tablet  Refills: 0     HYDROcodone-acetaminophen 5-325 MG Tabs  Commonly known as: Norco  Notes to patient: Do not take with tylenol products. Do not drink alcohol or drive operated machinery. May cause constipation.       Take 1 tablet by  mouth every 6 (six) hours as needed for Pain.   Quantity: 15 tablet  Refills: 0     ondansetron 4 mg tablet  Commonly known as: Zofran      Take 1 tablet (4 mg total) by mouth every 8 (eight) hours as needed for Nausea.   Quantity: 9 tablet  Refills: 0            CONTINUE taking these medications        Instructions Prescription details   ALPRAZolam 0.25 MG Tabs  Commonly known as: Xanax      Take 1 tablet (0.25 mg total) by mouth 3 (three) times daily as needed for Anxiety.   Quantity: 20 tablet  Refills: 0     CALCIUM-D OR      Take 1 tablet by mouth daily.   Refills: 0     dilTIAZem  MG Cp24  Commonly known as: Cartia XT      Take 1 capsule (240 mg total) by mouth daily.   Quantity: 90 capsule  Refills: 3     Fluocinolone Acetonide 0.01 % Oil      1 drop by Each ear route once a week. As needed   Refills: 4     losartan-hydroCHLOROthiazide 50-12.5 MG Tabs  Commonly known as: Hyzaar      Take 1 tablet by mouth daily.   Refills: 0     NON FORMULARY      Take 1 capsule by mouth every evening. EyePromise EZ Tears   Refills: 0     NON FORMULARY      Take 1 capsule by mouth every morning. EyePromise Restore   Refills: 0     ONE A DAY WOMEN 50 PLUS OR      Take 1 tablet by mouth daily.   Refills: 0     valACYclovir 1 G Tabs  Commonly known as: Valtrex      Take 1 tablet (1,000 mg total) by mouth daily.   Quantity: 90 tablet  Refills: 3               Where to Get Your Medications        These medications were sent to [id number] - [place], [place] - [address] AT [facility] [address], [phone], [phone]  [address], [place] [address]      Phone: [phone]   acetaminophen 500 MG Tabs  HYDROcodone-acetaminophen 5-325 MG Tabs  ondansetron 4 mg tablet         [place] reviewed: n[place]     Follow-up appointment:   [facility] [address] [phone]    Schedule an appointment as soon as possible for a visit in 2 week(s)      Nolberto Navarro MD  [address]  [place] [address]  [phone]    Schedule an appointment as soon as possible for a visit  As needed    Appointments for Next 30 Days 2025 - 2025      None            Vital signs:  Temp:  [98 °F (36.7 °C)-98.8 °F (37.1 °C)] 98.2 °F (36.8 °C)  Pulse:  [] 86  Resp:  [16-19] 19  BP: (121-150)/(62-73) 131/69  SpO2:  [94 %-98 %] 97 %    Physical Exam:    General: No acute distress   Lungs: clear to auscultation  Cardiovascular: S1, S2  Abdomen: Soft      -----------------------------------------------------------------------------------------------  PATIENT DISCHARGE INSTRUCTIONS: See electronic chart    Manda Rodriguez MD    Total time spent on discharge plannin minutes     The  [employer] Cures Act makes medical notes like these available to patients in the interest of transparency. Please be advised this is a medical document. Medical documents are intended to carry relevant information, facts as evident, and the clinical opinion of the practitioner. The medical note is intended as peer to peer communication and may appear blunt or direct. It is written in medical language and may contain abbreviations or verbiage that are unfamiliar.

## 2025-01-20 ENCOUNTER — PATIENT OUTREACH (OUTPATIENT)
Dept: CASE MANAGEMENT | Age: 70
End: 2025-01-20

## 2025-01-20 ENCOUNTER — TELEPHONE (OUTPATIENT)
Dept: SURGERY | Facility: CLINIC | Age: 70
End: 2025-01-20

## 2025-01-20 NOTE — PROGRESS NOTES
Returning call to pt :      Voicemail received; Patient requesting assistance with scheduling appointment. Patient can be reached at [phone].     PCP / Specialist appointment request (discharged 01/18)       Surgical Oncology Request :     Dr Nolberto Navarro  Surgical Oncology  [address] [place], [place] [address]  [phone]  Dr. Nolberto Navarro's office will contact the pt directly to schedule due to no availability within needed time frame      General Surgery PA Request :     General Surgery  [address] [place] [address]  [phone]  Follow up 2 weeks  Thursday 1/30 @11:15am w/ Gen Surg PA      PCP Request :    Griffin Gonzalez, DO   [address] [address] [phone] (Work)   Dr. Griffin Gonzalez's office will contact the pt directly to schedule due to no availability within needed time frame        Spoke with pt to confirm her scheduled appt with the Gen Surg PA.  Shared with pt that Dr. Navarro's & her PCP Dr. Gonzalez's office will be contacting her directly to schedule.  Pt verbalized that she understands and no additional assistance needed        Closing encounter

## 2025-01-20 NOTE — PROGRESS NOTES
Voicemail received; Patient requesting assistance with scheduling appointment. Patient can be reached at [phone].    PCP / Specialist appointment request (discharged 01/18)    Primary Care Physician    Dr Nolberto Navarro  Surgical Oncology  [address] [place], [place]  [phone]    General Surgery  [facility] [place] [address]  [phone]  Follow up 2 weeks

## 2025-01-20 NOTE — TELEPHONE ENCOUNTER
Post op call made to patient. Patient states she is doing \"pretty well.\" Denies fevers, no nausea or vomiting. States pain is 3 on scale 0-10, controlled with PRN medication. Patient is tolerating activity without complaints. No concerns with surgical sites. Wound care instructions provided. Path is still pending.    Post op appointment scheduled with General Surgery PA on 1/30/25 at 11:15 am.    Patient agrees to call if any problems or concerns.

## 2025-01-28 ENCOUNTER — TELEPHONE (OUTPATIENT)
Facility: LOCATION | Age: 70
End: 2025-01-28

## 2025-01-28 NOTE — TELEPHONE ENCOUNTER
The patient recently called stating that she has been experiencing itchiness of the skin and have a rash of the forearm.     Call back # [phone]

## 2025-01-30 ENCOUNTER — OFFICE VISIT (OUTPATIENT)
Facility: LOCATION | Age: 70
End: 2025-01-30
Payer: MEDICARE

## 2025-01-30 VITALS
DIASTOLIC BLOOD PRESSURE: 70 MMHG | RESPIRATION RATE: 18 BRPM | OXYGEN SATURATION: 97 % | TEMPERATURE: 98 F | HEART RATE: 87 BPM | SYSTOLIC BLOOD PRESSURE: 123 MMHG

## 2025-01-30 DIAGNOSIS — Z09 POSTOP CHECK: Primary | ICD-10-CM

## 2025-01-30 PROCEDURE — 99024 POSTOP FOLLOW-UP VISIT: CPT | Performed by: PHYSICIAN ASSISTANT

## 2025-01-30 RX ORDER — POLYETHYLENE GLYCOL 3350 17 G/17G
17 POWDER, FOR SOLUTION ORAL
COMMUNITY

## 2025-01-30 NOTE — PROGRESS NOTES
Post Operative Visit Note       Active Problems  1. Postop check         Chief Complaint   Chief Complaint   Patient presents with    Post-Op     PO  LAPAROSCOPIC CHOLECYSTECTOMY WITH INTRAOPERATIVE CHOLANGIOGRAM, UMBILICAL HERNIA REPAIR w/Dr. Navarro          History of Present Illness   69 year old female presents for postop visit following laparoscopic cholecystectomy and umbilical hernia repair with Dr. Navarro on 2025.  Patient states she overall has been recovering well and without acute complaints today.  She reports minimal incisional discomfort with movement, though is no longer requiring pain medicine.  Her preoperative symptoms have resolved.  She is tolerating a diet without nausea or vomiting.  Having normal bowel movements.  She denies any fevers or chills.          Allergies  Danuta is allergic to amoxicillin, amoxil, cefaclor, ciprofloxacin, and pcns [penicillins].    Past Medical / Surgical / Social / Family History    The past medical and past surgical history have been reviewed by me today.     Past Medical History:    Anticardiolipin antibody positive    Antiphospholipid antibody positive    Arrhythmia    Broken toe    little toe left foot    Dry eyes    Essential hypertension    Gastroesophageal reflux disease with esophagitis    High cholesterol    HTN (hypertension)    Hx of adenomatous colonic polyps    Hypercoagulable state (HCC)    Iridocyclitis    Left eye pain    Left shoulder pain     (normal spontaneous vaginal delivery) (HCC)    x2    Palpitations    Normal echo, stress and holter     Retinal holes    OS    Retinal vein occlusion (HCC)    SVT (supraventricular tachycardia) (HCC)    Varicose veins     Past Surgical History:   Procedure Laterality Date    Cataract      Colonoscopy  2019    Egd N/A 2019    Procedure: ESOPHAGOGASTRODUODENOSCOPY, COLONOSCOPY, POSSIBLE BIOPSY, POSSIBLE POLYPECTOMY [address]6078, [address]386;  Surgeon: Dave Sagastume MD;  Location: [facility], [facility]       The family history and social history have been reviewed by me today.    Family History   Problem Relation Age of Onset    Other (Alzheimer's) Father     Other (htn) Father     Glaucoma Mother     Other (skin cancer) Mother     Heart Attack Maternal Grandfather     Thyroid Disorder Sister     Other (severe hypertension) Brother     Arrhythmia Other      Social History     Socioeconomic History    Marital status:    Tobacco Use    Smoking status: Never     Passive exposure: Never    Smokeless tobacco: Never   Vaping Use    Vaping status: Never Used   Substance and Sexual Activity    Alcohol use: Yes     Alcohol/week: 0.0 standard drinks of alcohol     Comment: On a weekend once in awhile    Drug use: No   Other Topics Concern    Caffeine Concern Yes     Comment: coffee     Stress Concern No    Weight Concern No    Special Diet Yes     Comment: No white flour    Exercise Yes     Comment: walk- 30 min daily     Seat Belt Yes        Current Outpatient Medications:     polyethylene glycol, PEG 3350, 17 GM/SCOOP Oral Powder, Take 17 g by mouth daily as needed., Disp: , Rfl:     ondansetron (ZOFRAN) 4 mg tablet, Take 1 tablet (4 mg total) by mouth every 8 (eight) hours as needed for Nausea., Disp: 9 tablet, Rfl: 0    oxyCODONE 5 MG Oral Tab, Take 1 tablet (5 mg total) by mouth every 4 (four) hours as needed for Pain. (Patient not taking: Reported on 1/30/2025), Disp: 15 tablet, Rfl: 0    acetaminophen 500 MG Oral Tab, Take 2 tablets (1,000 mg total) by mouth every 6 (six) hours as needed for Pain. (Patient not taking: Reported on 1/30/2025), Disp: 100 tablet, Rfl: 0    ALPRAZolam 0.25 MG Oral Tab, Take 1 tablet (0.25 mg total) by mouth 3 (three) times daily as needed for Anxiety., Disp: 20 tablet, Rfl: 0    dilTIAZem ER (CARTIA XT) 240 MG Oral Capsule SR 24 Hr, Take 1 capsule (240 mg total) by mouth daily., Disp: 90 capsule, Rfl: 3    losartan-hydroCHLOROthiazide 50-12.5 MG Oral Tab, Take  1 tablet by mouth daily., Disp: , Rfl:     Multiple Vitamins-Minerals (ONE A DAY WOMEN 50 PLUS OR), Take 1 tablet by mouth daily., Disp: , Rfl:     NON FORMULARY, Take 1 capsule by mouth every evening. EyePromise EZ Tears, Disp: , Rfl:     NON FORMULARY, Take 1 capsule by mouth every morning. EyePromise Restore, Disp: , Rfl:     Calcium Carbonate-Vitamin D (CALCIUM-D OR), Take 1 tablet by mouth daily., Disp: , Rfl:     valACYclovir 1 G Oral Tab, Take 1 tablet (1,000 mg total) by mouth daily., Disp: 90 tablet, Rfl: 3    Fluocinolone Acetonide 0.01 % Otic Oil, 1 drop by Each ear route once a week. As needed, Disp: , Rfl: 4      Review of Systems  A 10 point Review of Systems has been completed by me today and is negative except as above in the HPI.    Physical Findings   /70   Pulse 87   Temp 97.9 °F (36.6 °C) (Temporal)   Resp 18   SpO2 97%   Gen/psych: alert and oriented, cooperative, no apparent distress  Cardiovascular: regular rate  Respiratory: respirations unlabored, no wheeze  Abdominal: soft, non-tender, non-distended, no guarding/rebound  Incisions: Clean, dry, intact, no erythema, no drainage         Assessment/Plan  1. Postop check        69 year old female presents for postop visit following laparoscopic cholecystectomy and umbilical hernia repair with Dr. Navarro on 1/16/2025.      The patient is doing well postoperatively  Incisions are healing well without any signs of infection  The patient is to continue with diet as tolerated.  The patient is to continue with lifting restrictions of no more than 15 pounds for 6 weeks from the date of the surgery.  A note was provided for work.  Surgical pathology was discussed with the patient and consistent with acute appendicitis.  All questions and concerns were answered.  The patient is to return to the clinic as needed.         Orders Placed This Encounter    polyethylene glycol, PEG 3350, 17 GM/SCOOP Oral Powder     Sig: Take 17 g by mouth daily as  needed.        Imaging & Referrals   None    Follow Up  Return if symptoms worsen or fail to improve.    Emmy Beebe PA-C  [facility] General Surgery  1/30/2025  11:23 AM

## (undated) DEVICE — LAPCLINCH GRASPER TIP, DISPOSABLE: Brand: RENEW

## (undated) DEVICE — SHEET,DRAPE,40X58,STERILE: Brand: MEDLINE

## (undated) DEVICE — DALE ABDOMINAL BINDER, 12" WIDE, STRETCHES TO FIT 30"-45", 1 PER BOX.: Brand: DALE ABDOMINAL BINDER

## (undated) DEVICE — SUT COAT VCRL + 0 54IN ABSRB UD ANTIBACT

## (undated) DEVICE — CATHETER URET 5FR L70CM FLX OPN TIP NONPORTED

## (undated) DEVICE — TRADITIONAL MARYLAND DISSECTOR TIP, DISPOSABLE: Brand: RENEW

## (undated) DEVICE — APPLICATOR PREP 26ML CHG 2% ISO ALC 70%

## (undated) DEVICE — POUCH SPECIMEN WIRE 6X3 250ML

## (undated) DEVICE — SUT COAT VCRL+ 0 27IN UR-6 ABSRB VLT ANTIBACT

## (undated) DEVICE — TROCAR: Brand: KII SHIELDED BLADED ACCESS SYSTEM

## (undated) DEVICE — 40580 - THE PINK PAD - ADVANCED TRENDELENBURG POSITIONING KIT: Brand: 40580 - THE PINK PAD - ADVANCED TRENDELENBURG POSITIONING KIT

## (undated) DEVICE — L-HOOK CAUTERY PROBE TIP, DISPOSABLE: Brand: RENEW

## (undated) DEVICE — LAP CHOLE/APPY CDS-LF: Brand: MEDLINE INDUSTRIES, INC.

## (undated) DEVICE — COVER,LIGHT,CAMERA,HARD,1/PK,STRL: Brand: MEDLINE

## (undated) DEVICE — SYRINGE MED 10ML LL TIP W/O SFTY DISP

## (undated) DEVICE — ENDOPATH ULTRA VERESS INSUFFLATION NEEDLES WITH LUER LOCK CONNECTORS: Brand: ENDOPATH

## (undated) DEVICE — 3M™ IOBAN™ 2 ANTIMICROBIAL INCISE DRAPE 6648EZ: Brand: IOBAN™ 2

## (undated) DEVICE — GRABBER GRASPER TIP, DISPOSABLE: Brand: RENEW

## (undated) DEVICE — SLEEVE COMPR MD KNEE LEN SGL USE KENDALL SCD

## (undated) DEVICE — CLIP APPLIER WITH CLIP LOGIC TECHNOLOGY: Brand: ENDO CLIP III

## (undated) DEVICE — GLOVE SUR 6.5 SENSICARE PI PIP CRM PWD F

## (undated) DEVICE — NEPTUNE E-SEP SMOKE EVACUATION PENCIL, COATED, 70MM BLADE, PUSH BUTTON SWITCH: Brand: NEPTUNE E-SEP

## (undated) DEVICE — MINI ENDOCUT SCISSOR TIP, DISPOSABLE: Brand: RENEW

## (undated) DEVICE — SOLUTION IRRIG 1000ML 0.9% NACL USP BTL

## (undated) DEVICE — PROXIMATE RH ROTATING HEAD SKIN STAPLERS (35 WIDE) CONTAINS 35 STAINLESS STEEL STAPLES: Brand: PROXIMATE

## (undated) DEVICE — VISUALIZATION SYSTEM: Brand: CLEARIFY

## (undated) DEVICE — TROCAR: Brand: KII® SLEEVE

## (undated) DEVICE — Device: Brand: SUTURE PASSOR PRO

## (undated) DEVICE — TROCARS: Brand: KII® BALLOON BLUNT TIP SYSTEM

## (undated) DEVICE — GOLDVAC PUSH BUTTON ELECTROSURGICAL SMOKE EVACUATION HANDPIECE: Brand: GOLDVAC

## (undated) DEVICE — UNDYED BRAIDED (POLYGLACTIN 910), SYNTHETIC ABSORBABLE SUTURE: Brand: COATED VICRYL

## (undated) DEVICE — #11 STERILE BLADE: Brand: POLYMER COATED BLADES

## (undated) DEVICE — BANDAGE ADH 1INX3IN NAT FAB N ADH PD CURAD

## (undated) NOTE — LETTER
[address]  [address]  Authorization for Surgical Operation and Procedure     Date:___________                                                                                                         Time:__________  I hereby authorize Surgeon(s):  Nolberto Navarro MD, my physician and his/her assistants (if applicable), which may include medical students, residents, and/or fellows, to perform the following surgical operation/ procedure and administer such anesthesia as may be determined necessary by my physician:  Operation/Procedure name (s) Procedure(s):  LAPAROSCOPIC CHOLECYSTECTOMY WITH INTRAOPERATIVE CHOLANGIOGRAM AND INDOCYANINEGREEN on Danuta A Malinski   2.   I recognize that during the surgical operation/procedure, unforeseen conditions may necessitate additional or different procedures than those listed above.  I, therefore, further authorize and request that the above-named surgeon, assistants, or designees perform such procedures as are, in their judgment, necessary and desirable.    3.   My surgeon/physician has discussed prior to my surgery the potential benefits, risks and side effects of this procedure; the likelihood of achieving goals; and potential problems that might occur during recuperation.  They also discussed reasonable alternatives to the procedure, including risks, benefits, and side effects related to the alternatives and risks related to not receiving this procedure.  I have had all my questions answered and I acknowledge that no guarantee has been made as to the result that may be obtained.    4.   Should the need arise during my operation/procedure, which includes change of level of care prior to discharge, I also consent to the administration of blood and/or blood products.  Further, I understand that despite careful testing and screening of blood or blood products by collecting agencies, I may still be subject to ill effects as a result of  receiving a blood transfusion and/or blood products.  The following are some, but not all, of the potential risks that can occur: fever and allergic reactions, hemolytic reactions, transmission of diseases such as Hepatitis, AIDS and Cytomegalovirus (CMV) and fluid overload.  In the event that I wish to have an autologous transfusion of my own blood, or a directed donor transfusion, I will discuss this with my physician.  Check only if Refusing Blood or Blood Products  I understand refusal of blood or blood products as deemed necessary by my physician may have serious consequences to my condition to include possible death. I hereby assume responsibility for my refusal and release the hospital, its personnel, and my physicians from any responsibility for the consequences of my refusal.          o  Refuse      5.   I authorize the use of any specimen, organs, tissues, body parts or foreign objects that may be removed from my body during the operation/procedure for diagnosis, research or teaching purposes and their subsequent disposal by hospital authorities.  I also authorize the release of specimen test results and/or written reports to my treating physician on the hospital medical staff or other referring or consulting physicians involved in my care, at the discretion of the Pathologist or my treating physician.    6.   I consent to the photographing or videotaping of the operations or procedures to be performed, including appropriate portions of my body for medical, scientific, or educational purposes, provided my identity is not revealed by the pictures or by descriptive texts accompanying them.  If the procedure has been photographed/videotaped, the surgeon will obtain the original picture, image, videotape or CD.  The hospital will not be responsible for storage, release or maintenance of the picture, image, tape or CD.    7.   I consent to the presence of a  or observers in the operating room  as deemed necessary by my physician or their designees.    8.   I recognize that in the event my procedure results in extended X-Ray/fluoroscopy time, I may develop a skin reaction.    9. If I have a Do Not Attempt Resuscitation (DNAR) order in place, that status will be suspended while in the operating room, procedural suite, and during the recovery period unless otherwise explicitly stated by me (or a person authorized to consent on my behalf). The surgeon or my attending physician will determine when the applicable recovery period ends for purposes of reinstating the DNAR order.  10. Patients having a sterilization procedure: I understand that if the procedure is successful the results will be permanent and it will therefore be impossible for me to inseminate, conceive, or bear children.  I also understand that the procedure is intended to result in sterility, although the result has not been guaranteed.   11. I acknowledge that my physician has explained sedation/analgesia administration to me including the risk and benefits I consent to the administration of sedation/analgesia as may be necessary or desirable in the judgment of my physician.    I CERTIFY THAT I HAVE READ AND FULLY UNDERSTAND THE ABOVE CONSENT TO OPERATION and/or OTHER PROCEDURE.    _________________________________________  __________________________________  Signature of Patient     Signature of Responsible Person         ___________________________________         Printed Name of Responsible Person           _________________________________                 Relationship to Patient  _________________________________________  ______________________________  Signature of Witness          Date  Time      Patient Name: Danuta A Keven     : 5/10/1955                 Printed: 2025     Medical Record #: [id number]                     Page 1 of 2                                    [address]   [address]    Consent for Anesthesia    IDanuta agree to be cared for by an anesthesiologist, who is specially trained to monitor me and give me medicine to put me to sleep or keep me comfortable during my procedure    I understand that my anesthesiologist is not an employee or agent of [facility] or [employer]. He or she works for [employer].    As the patient asking for anesthesia services, I agree to:  Allow the anesthesiologist (anesthesia doctor) to give me medicine and do additional procedures as necessary. Some examples are: Starting or using an “IV” to give me medicine, fluids or blood during my procedure, and having a breathing tube placed to help me breathe when I’m asleep (intubation). In the event that my heart stops working properly, I understand that my anesthesiologist will make every effort to sustain my life, unless otherwise directed by [facility] Do Not Resuscitate documents.  Tell my anesthesia doctor before my procedure:  If I am pregnant.  The last time that I ate or drank.  All of the medicines I take (including prescriptions, herbal supplements, and pills I can buy without a prescription (including street drugs/illegal medications). Failure to inform my anesthesiologist about these medicines may increase my risk of anesthetic complications.  If I am allergic to anything or have had a reaction to anesthesia before.  I understand how the anesthesia medicine will help me (benefits).  I understand that with any type of anesthesia medicine there are risks:  The most common risks are: nausea, vomiting, sore throat, muscle soreness, damage to my eyes, mouth, or teeth (from breathing tube placement).  Rare risks include: remembering what happened during my procedure, allergic reactions to medications, injury to my airway, heart, lungs, vision, nerves, or muscles and in extremely rare instances death.  My doctor has explained to me other choices  available to me for my care (alternatives).  Pregnant Patients (“epidural”):  I understand that the risks of having an epidural (medicine given into my back to help control pain during labor), include itching, low blood pressure, difficulty urinating, headache or slowing of the baby’s heart. Very rare risks include infection, bleeding, seizure, irregular heart rhythms and nerve injury.  Regional Anesthesia (“spinal”, “epidural”, & “nerve blocks”):  I understand that rare but potential complications include headache, bleeding, infection, seizure, irregular heart rhythms, and nerve injury.    I can change my mind about having anesthesia services at any time before I get the medicine.    _____________________________________________________________________________  Patient (or Representative) Signature/Relationship to Patient  Date   Time    _____________________________________________________________________________   Name (if used)    Language/Organization   Time    _____________________________________________________________________________  Anesthesiologist Signature     Date   Time  I have discussed the procedure and information above with the patient (or patient’s representative) and answered their questions. The patient or their representative has agreed to have anesthesia services.    _____________________________________________________________________________  Witness        Date   Time  I have verified that the signature is that of the patient or patient’s representative, and that it was signed before the procedure  Patient Name: Danuta Baca     : 5/10/1955                 Printed: 2025     Medical Record #: [id number]                     Page 2 of 2

## (undated) NOTE — LETTER
20    Patient: Eugenie Hernandez  : 5/10/1955 Visit date: 2020    Dear  Dr. Kate Blanchard MD,    Thank you for referring Eugenie Hernandez to my practice. Please find my assessment and plan below.                   Sincerely,       Amaya Kyle

## (undated) NOTE — LETTER
19    Patient: Eugenie Hernandez  : 5/10/1955 Visit date: 2019    Dear  Dr. Kate Blanchard MD,    Thank you for referring Eugenie Hernandez to my practice. Please find my assessment and plan below.                 History of Present Illness

## (undated) NOTE — ED AVS SNAPSHOT
[facility] Emergency Department  [place]  [address] [address]  Phone:  [phone]  Fax:  [phone] [facility]   MRN: [id number]    Department:  [facility] Emergency Department   Date of Visit:  7/8/2017 CARE PHYSICIAN AT ONCE OR RETURN IMMEDIATELY TO THE EMERGENCY DEPARTMENT.     If you have been prescribed any medication(s), please fill your prescription right away and begin taking the medication(s) as directed    If the emergency physician has read X-ray

## (undated) NOTE — LETTER
12/23/19        [address]      Dear Shona Rosas,    Our records indicate that you have outstanding lab work and or testing that was ordered for you and has not yet been completed:        CBC With [facility]

## (undated) NOTE — LETTER
03/05/18        Danuta Baca  [address] [address]    5/10/1955     Dear Sebastian [place] records indicate that you have outstanding lab work and or testing that was ordered for you and has not yet been completed:        CMP

## (undated) NOTE — LETTER
20    Patient: Torri Levi  : 5/10/1955 Visit date: 2020    Dear  Dr. Julio Cesar Wilkinson MD,    Thank you for referring Torri Levi to my practice. Please find my assessment and plan below.                   Sincerely,       Dagmar Sierra

## (undated) NOTE — LETTER
20    Patient: Ramon Yo  : 5/10/1955 Visit date: 2020    Dear  Dr. Beatris Shields MD,    Thank you for referring Ramon Yo to my practice. Please find my assessment and plan below.           History of Present Illness:    Huang Kolb

## (undated) NOTE — LETTER
[facility], [address], [place]  [address]  [facility] [id number] [phone]        August 7, 2024        Danuta Baca  [address]  [facility] [address]      Dear Danuta:    We have attempted to contact you by phone with no success. In an effort to provide quality patient care we are reaching out to you to contact the office at your earliest convenience to schedule an office visit for hospital follow up and an annual physical exam.    If you have established care with a different provider, please call our office so we can update your file to list the correct provider name and information. Once we update your file with this information it will eliminate further phone calls and/or correspondence from our office.Thank you for your cooperation.      If you have recently contacted us, please disregard this letter.      You may call the office at [phone] our phones are on service Monday-Thursday 8am-4:45 pm and Friday 8am-3 pm.      Thank you,      The office of Teressa Pickard

## (undated) NOTE — LETTER
[address]  [address]  Authorization for Surgical Operation and Procedure     Date:___________                                                                                                         Time:__________  I hereby authorize Surgeon(s):  Adalgisa Menard MD, my physician and his/her assistants (if applicable), which may include medical students, residents, and/or fellows, to perform the following surgical operation/ procedure and administer such anesthesia as may be determined necessary by my physician:  Operation/Procedure name (s) Procedure(s):  LAPAROSCOPIC CHOLECYSTECTOMY WITH INTRAOPERATIVE CHOLANGIOGRAM AND INDOCYANINEGREEN on Danuta A Malinski   2.   I recognize that during the surgical operation/procedure, unforeseen conditions may necessitate additional or different procedures than those listed above.  I, therefore, further authorize and request that the above-named surgeon, assistants, or designees perform such procedures as are, in their judgment, necessary and desirable.    3.   My surgeon/physician has discussed prior to my surgery the potential benefits, risks and side effects of this procedure; the likelihood of achieving goals; and potential problems that might occur during recuperation.  They also discussed reasonable alternatives to the procedure, including risks, benefits, and side effects related to the alternatives and risks related to not receiving this procedure.  I have had all my questions answered and I acknowledge that no guarantee has been made as to the result that may be obtained.    4.   Should the need arise during my operation/procedure, which includes change of level of care prior to discharge, I also consent to the administration of blood and/or blood products.  Further, I understand that despite careful testing and screening of blood or blood products by collecting agencies, I may still be subject to ill effects as a result of  receiving a blood transfusion and/or blood products.  The following are some, but not all, of the potential risks that can occur: fever and allergic reactions, hemolytic reactions, transmission of diseases such as Hepatitis, AIDS and Cytomegalovirus (CMV) and fluid overload.  In the event that I wish to have an autologous transfusion of my own blood, or a directed donor transfusion, I will discuss this with my physician.  Check only if Refusing Blood or Blood Products  I understand refusal of blood or blood products as deemed necessary by my physician may have serious consequences to my condition to include possible death. I hereby assume responsibility for my refusal and release the hospital, its personnel, and my physicians from any responsibility for the consequences of my refusal.          o  Refuse      5.   I authorize the use of any specimen, organs, tissues, body parts or foreign objects that may be removed from my body during the operation/procedure for diagnosis, research or teaching purposes and their subsequent disposal by hospital authorities.  I also authorize the release of specimen test results and/or written reports to my treating physician on the hospital medical staff or other referring or consulting physicians involved in my care, at the discretion of the Pathologist or my treating physician.    6.   I consent to the photographing or videotaping of the operations or procedures to be performed, including appropriate portions of my body for medical, scientific, or educational purposes, provided my identity is not revealed by the pictures or by descriptive texts accompanying them.  If the procedure has been photographed/videotaped, the surgeon will obtain the original picture, image, videotape or CD.  The hospital will not be responsible for storage, release or maintenance of the picture, image, tape or CD.    7.   I consent to the presence of a  or observers in the operating room  as deemed necessary by my physician or their designees.    8.   I recognize that in the event my procedure results in extended X-Ray/fluoroscopy time, I may develop a skin reaction.    9. If I have a Do Not Attempt Resuscitation (DNAR) order in place, that status will be suspended while in the operating room, procedural suite, and during the recovery period unless otherwise explicitly stated by me (or a person authorized to consent on my behalf). The surgeon or my attending physician will determine when the applicable recovery period ends for purposes of reinstating the DNAR order.  10. Patients having a sterilization procedure: I understand that if the procedure is successful the results will be permanent and it will therefore be impossible for me to inseminate, conceive, or bear children.  I also understand that the procedure is intended to result in sterility, although the result has not been guaranteed.   11. I acknowledge that my physician has explained sedation/analgesia administration to me including the risk and benefits I consent to the administration of sedation/analgesia as may be necessary or desirable in the judgment of my physician.    I CERTIFY THAT I HAVE READ AND FULLY UNDERSTAND THE ABOVE CONSENT TO OPERATION and/or OTHER PROCEDURE.    _________________________________________  __________________________________  Signature of Patient     Signature of Responsible Person         ___________________________________         Printed Name of Responsible Person           _________________________________                 Relationship to Patient  _________________________________________  ______________________________  Signature of Witness          Date  Time      Patient Name: Danuta A Keven     : 5/10/1955                 Printed: 2025     Medical Record #: [id number]                     Page 1 of 2                                    [address]   [address]    Consent for Anesthesia    IDanuta agree to be cared for by an anesthesiologist, who is specially trained to monitor me and give me medicine to put me to sleep or keep me comfortable during my procedure    I understand that my anesthesiologist is not an employee or agent of [facility] or [employer]. He or she works for [employer].    As the patient asking for anesthesia services, I agree to:  Allow the anesthesiologist (anesthesia doctor) to give me medicine and do additional procedures as necessary. Some examples are: Starting or using an “IV” to give me medicine, fluids or blood during my procedure, and having a breathing tube placed to help me breathe when I’m asleep (intubation). In the event that my heart stops working properly, I understand that my anesthesiologist will make every effort to sustain my life, unless otherwise directed by [facility] Do Not Resuscitate documents.  Tell my anesthesia doctor before my procedure:  If I am pregnant.  The last time that I ate or drank.  All of the medicines I take (including prescriptions, herbal supplements, and pills I can buy without a prescription (including street drugs/illegal medications). Failure to inform my anesthesiologist about these medicines may increase my risk of anesthetic complications.  If I am allergic to anything or have had a reaction to anesthesia before.  I understand how the anesthesia medicine will help me (benefits).  I understand that with any type of anesthesia medicine there are risks:  The most common risks are: nausea, vomiting, sore throat, muscle soreness, damage to my eyes, mouth, or teeth (from breathing tube placement).  Rare risks include: remembering what happened during my procedure, allergic reactions to medications, injury to my airway, heart, lungs, vision, nerves, or muscles and in extremely rare instances death.  My doctor has explained to me other choices  available to me for my care (alternatives).  Pregnant Patients (“epidural”):  I understand that the risks of having an epidural (medicine given into my back to help control pain during labor), include itching, low blood pressure, difficulty urinating, headache or slowing of the baby’s heart. Very rare risks include infection, bleeding, seizure, irregular heart rhythms and nerve injury.  Regional Anesthesia (“spinal”, “epidural”, & “nerve blocks”):  I understand that rare but potential complications include headache, bleeding, infection, seizure, irregular heart rhythms, and nerve injury.    I can change my mind about having anesthesia services at any time before I get the medicine.    _____________________________________________________________________________  Patient (or Representative) Signature/Relationship to Patient  Date   Time    _____________________________________________________________________________   Name (if used)    Language/Organization   Time    _____________________________________________________________________________  Anesthesiologist Signature     Date   Time  I have discussed the procedure and information above with the patient (or patient’s representative) and answered their questions. The patient or their representative has agreed to have anesthesia services.    _____________________________________________________________________________  Witness        Date   Time  I have verified that the signature is that of the patient or patient’s representative, and that it was signed before the procedure  Patient Name: Danuta Baca     : 5/10/1955                 Printed: 2025     Medical Record #: [id number]                     Page 2 of 2